# Patient Record
Sex: MALE | Race: WHITE | NOT HISPANIC OR LATINO | Employment: UNEMPLOYED | URBAN - METROPOLITAN AREA
[De-identification: names, ages, dates, MRNs, and addresses within clinical notes are randomized per-mention and may not be internally consistent; named-entity substitution may affect disease eponyms.]

---

## 2017-03-15 ENCOUNTER — AMBULATORY - HEALTHEAST (OUTPATIENT)
Dept: CARDIOLOGY | Facility: CLINIC | Age: 47
End: 2017-03-15

## 2017-03-15 ENCOUNTER — OFFICE VISIT - HEALTHEAST (OUTPATIENT)
Dept: CARDIOLOGY | Facility: CLINIC | Age: 47
End: 2017-03-15

## 2017-03-15 DIAGNOSIS — I48.0 PAROXYSMAL ATRIAL FIBRILLATION (H): ICD-10-CM

## 2017-03-15 ASSESSMENT — MIFFLIN-ST. JEOR: SCORE: 1903.96

## 2017-03-16 LAB
ATRIAL RATE - MUSE: 72 BPM
DIASTOLIC BLOOD PRESSURE - MUSE: NORMAL MMHG
INTERPRETATION ECG - MUSE: NORMAL
P AXIS - MUSE: 61 DEGREES
PR INTERVAL - MUSE: 168 MS
QRS DURATION - MUSE: 92 MS
QT - MUSE: 376 MS
QTC - MUSE: 411 MS
R AXIS - MUSE: 69 DEGREES
SYSTOLIC BLOOD PRESSURE - MUSE: NORMAL MMHG
T AXIS - MUSE: 47 DEGREES
VENTRICULAR RATE- MUSE: 72 BPM

## 2018-02-20 ENCOUNTER — AMBULATORY - HEALTHEAST (OUTPATIENT)
Dept: CARDIOLOGY | Facility: CLINIC | Age: 48
End: 2018-02-20

## 2018-02-20 ENCOUNTER — RECORDS - HEALTHEAST (OUTPATIENT)
Dept: ADMINISTRATIVE | Facility: OTHER | Age: 48
End: 2018-02-20

## 2018-02-21 ENCOUNTER — OFFICE VISIT - HEALTHEAST (OUTPATIENT)
Dept: CARDIOLOGY | Facility: CLINIC | Age: 48
End: 2018-02-21

## 2018-02-21 DIAGNOSIS — I48.0 AF (PAROXYSMAL ATRIAL FIBRILLATION) (H): ICD-10-CM

## 2018-02-21 RX ORDER — CARBAMAZEPINE 100 MG/1
100 TABLET, EXTENDED RELEASE ORAL DAILY
Status: SHIPPED | COMMUNITY
Start: 2018-02-21 | End: 2022-08-29

## 2018-02-21 ASSESSMENT — MIFFLIN-ST. JEOR: SCORE: 1909.75

## 2018-02-22 LAB
ATRIAL RATE - MUSE: 66 BPM
DIASTOLIC BLOOD PRESSURE - MUSE: NORMAL MMHG
INTERPRETATION ECG - MUSE: NORMAL
P AXIS - MUSE: 52 DEGREES
PR INTERVAL - MUSE: 162 MS
QRS DURATION - MUSE: 84 MS
QT - MUSE: 370 MS
QTC - MUSE: 387 MS
R AXIS - MUSE: 62 DEGREES
SYSTOLIC BLOOD PRESSURE - MUSE: NORMAL MMHG
T AXIS - MUSE: 45 DEGREES
VENTRICULAR RATE- MUSE: 66 BPM

## 2018-02-27 ENCOUNTER — COMMUNICATION - HEALTHEAST (OUTPATIENT)
Dept: CARDIOLOGY | Facility: CLINIC | Age: 48
End: 2018-02-27

## 2018-03-06 ENCOUNTER — RECORDS - HEALTHEAST (OUTPATIENT)
Dept: ADMINISTRATIVE | Facility: OTHER | Age: 48
End: 2018-03-06

## 2018-03-06 ENCOUNTER — AMBULATORY - HEALTHEAST (OUTPATIENT)
Dept: CARDIOLOGY | Facility: CLINIC | Age: 48
End: 2018-03-06

## 2018-03-07 ENCOUNTER — COMMUNICATION - HEALTHEAST (OUTPATIENT)
Dept: CARDIOLOGY | Facility: CLINIC | Age: 48
End: 2018-03-07

## 2018-04-03 ENCOUNTER — RECORDS - HEALTHEAST (OUTPATIENT)
Dept: LAB | Facility: CLINIC | Age: 48
End: 2018-04-03

## 2018-04-05 LAB — BACTERIA SPEC CULT: NORMAL

## 2018-05-23 ENCOUNTER — RECORDS - HEALTHEAST (OUTPATIENT)
Dept: LAB | Facility: CLINIC | Age: 48
End: 2018-05-23

## 2018-05-24 LAB
ANION GAP SERPL CALCULATED.3IONS-SCNC: 11 MMOL/L (ref 5–18)
BASOPHILS # BLD AUTO: 0 THOU/UL (ref 0–0.2)
BASOPHILS NFR BLD AUTO: 0 % (ref 0–2)
BUN SERPL-MCNC: 17 MG/DL (ref 8–22)
CALCIUM SERPL-MCNC: 9.2 MG/DL (ref 8.5–10.5)
CHLORIDE BLD-SCNC: 107 MMOL/L (ref 98–107)
CO2 SERPL-SCNC: 23 MMOL/L (ref 22–31)
CREAT SERPL-MCNC: 1.12 MG/DL (ref 0.7–1.3)
EOSINOPHIL # BLD AUTO: 0.1 THOU/UL (ref 0–0.4)
EOSINOPHIL NFR BLD AUTO: 1 % (ref 0–6)
ERYTHROCYTE [DISTWIDTH] IN BLOOD BY AUTOMATED COUNT: 13.4 % (ref 11–14.5)
GFR SERPL CREATININE-BSD FRML MDRD: >60 ML/MIN/1.73M2
GLUCOSE BLD-MCNC: 118 MG/DL (ref 70–125)
HCT VFR BLD AUTO: 42.1 % (ref 40–54)
HGB BLD-MCNC: 13.4 G/DL (ref 14–18)
LYMPHOCYTES # BLD AUTO: 2.2 THOU/UL (ref 0.8–4.4)
LYMPHOCYTES NFR BLD AUTO: 33 % (ref 20–40)
MCH RBC QN AUTO: 32.3 PG (ref 27–34)
MCHC RBC AUTO-ENTMCNC: 31.8 G/DL (ref 32–36)
MCV RBC AUTO: 101 FL (ref 80–100)
MONOCYTES # BLD AUTO: 0.4 THOU/UL (ref 0–0.9)
MONOCYTES NFR BLD AUTO: 6 % (ref 2–10)
NEUTROPHILS # BLD AUTO: 3.9 THOU/UL (ref 2–7.7)
NEUTROPHILS NFR BLD AUTO: 60 % (ref 50–70)
PLATELET # BLD AUTO: 144 THOU/UL (ref 140–440)
PMV BLD AUTO: 10.2 FL (ref 8.5–12.5)
POTASSIUM BLD-SCNC: 4.3 MMOL/L (ref 3.5–5)
RBC # BLD AUTO: 4.15 MILL/UL (ref 4.4–6.2)
SODIUM SERPL-SCNC: 141 MMOL/L (ref 136–145)
WBC: 6.6 THOU/UL (ref 4–11)

## 2018-05-26 LAB — LAMOTRIGINE SERPL-MCNC: 5 UG/ML (ref 2.5–15)

## 2018-05-27 LAB
CARBAMAZEPINE EP SERPL-MCNC: <0.2 UG/ML (ref 0.4–4)
SPECIMEN STATUS: ABNORMAL

## 2018-07-13 ENCOUNTER — AMBULATORY - HEALTHEAST (OUTPATIENT)
Dept: SLEEP MEDICINE | Facility: CLINIC | Age: 48
End: 2018-07-13

## 2018-07-13 ENCOUNTER — OFFICE VISIT - HEALTHEAST (OUTPATIENT)
Dept: SLEEP MEDICINE | Facility: CLINIC | Age: 48
End: 2018-07-13

## 2018-07-13 DIAGNOSIS — G47.33 OSA ON CPAP: ICD-10-CM

## 2018-07-13 ASSESSMENT — MIFFLIN-ST. JEOR: SCORE: 1906.12

## 2018-07-16 ENCOUNTER — AMBULATORY - HEALTHEAST (OUTPATIENT)
Dept: SLEEP MEDICINE | Facility: CLINIC | Age: 48
End: 2018-07-16

## 2018-07-19 ENCOUNTER — AMBULATORY - HEALTHEAST (OUTPATIENT)
Dept: SLEEP MEDICINE | Facility: CLINIC | Age: 48
End: 2018-07-19

## 2018-07-30 ENCOUNTER — COMMUNICATION - HEALTHEAST (OUTPATIENT)
Dept: SLEEP MEDICINE | Facility: CLINIC | Age: 48
End: 2018-07-30

## 2018-07-30 DIAGNOSIS — G47.33 OSA (OBSTRUCTIVE SLEEP APNEA): ICD-10-CM

## 2018-08-26 ENCOUNTER — RECORDS - HEALTHEAST (OUTPATIENT)
Dept: SLEEP MEDICINE | Facility: CLINIC | Age: 48
End: 2018-08-26

## 2018-08-26 DIAGNOSIS — G47.33 OBSTRUCTIVE SLEEP APNEA (ADULT) (PEDIATRIC): ICD-10-CM

## 2018-09-11 ENCOUNTER — COMMUNICATION - HEALTHEAST (OUTPATIENT)
Dept: SLEEP MEDICINE | Facility: CLINIC | Age: 48
End: 2018-09-11

## 2018-09-12 ENCOUNTER — COMMUNICATION - HEALTHEAST (OUTPATIENT)
Dept: SLEEP MEDICINE | Facility: CLINIC | Age: 48
End: 2018-09-12

## 2018-09-13 ENCOUNTER — AMBULATORY - HEALTHEAST (OUTPATIENT)
Dept: SLEEP MEDICINE | Facility: CLINIC | Age: 48
End: 2018-09-13

## 2018-10-15 ENCOUNTER — OFFICE VISIT - HEALTHEAST (OUTPATIENT)
Dept: SLEEP MEDICINE | Facility: CLINIC | Age: 48
End: 2018-10-15

## 2018-10-15 ENCOUNTER — AMBULATORY - HEALTHEAST (OUTPATIENT)
Dept: SLEEP MEDICINE | Facility: CLINIC | Age: 48
End: 2018-10-15

## 2018-10-15 DIAGNOSIS — G47.33 OSA ON CPAP: ICD-10-CM

## 2018-10-15 ASSESSMENT — MIFFLIN-ST. JEOR: SCORE: 1946.94

## 2018-10-16 ENCOUNTER — RECORDS - HEALTHEAST (OUTPATIENT)
Dept: LAB | Facility: CLINIC | Age: 48
End: 2018-10-16

## 2018-10-16 ENCOUNTER — COMMUNICATION - HEALTHEAST (OUTPATIENT)
Dept: SLEEP MEDICINE | Facility: CLINIC | Age: 48
End: 2018-10-16

## 2018-10-16 DIAGNOSIS — G47.33 OSA (OBSTRUCTIVE SLEEP APNEA): ICD-10-CM

## 2018-10-16 LAB
ANION GAP SERPL CALCULATED.3IONS-SCNC: 11 MMOL/L (ref 5–18)
BUN SERPL-MCNC: 16 MG/DL (ref 8–22)
CALCIUM SERPL-MCNC: 9 MG/DL (ref 8.5–10.5)
CHLORIDE BLD-SCNC: 106 MMOL/L (ref 98–107)
CHOLEST SERPL-MCNC: 223 MG/DL
CO2 SERPL-SCNC: 26 MMOL/L (ref 22–31)
CREAT SERPL-MCNC: 1.26 MG/DL (ref 0.7–1.3)
FASTING STATUS PATIENT QL REPORTED: NO
GFR SERPL CREATININE-BSD FRML MDRD: >60 ML/MIN/1.73M2
GLUCOSE BLD-MCNC: 87 MG/DL (ref 70–125)
HDLC SERPL-MCNC: 31 MG/DL
LDLC SERPL CALC-MCNC: 120 MG/DL
LDLC SERPL CALC-MCNC: ABNORMAL MG/DL
POTASSIUM BLD-SCNC: 4.3 MMOL/L (ref 3.5–5)
SODIUM SERPL-SCNC: 143 MMOL/L (ref 136–145)
TRIGL SERPL-MCNC: 588 MG/DL

## 2018-10-22 ENCOUNTER — AMBULATORY - HEALTHEAST (OUTPATIENT)
Dept: SLEEP MEDICINE | Facility: CLINIC | Age: 48
End: 2018-10-22

## 2018-10-25 ENCOUNTER — COMMUNICATION - HEALTHEAST (OUTPATIENT)
Dept: ADMINISTRATIVE | Facility: CLINIC | Age: 48
End: 2018-10-25

## 2018-11-16 ENCOUNTER — OFFICE VISIT - HEALTHEAST (OUTPATIENT)
Dept: AUDIOLOGY | Facility: CLINIC | Age: 48
End: 2018-11-16

## 2018-11-16 ENCOUNTER — OFFICE VISIT - HEALTHEAST (OUTPATIENT)
Dept: OTOLARYNGOLOGY | Facility: CLINIC | Age: 48
End: 2018-11-16

## 2018-11-16 DIAGNOSIS — H60.393 INFECTIVE OTITIS EXTERNA, BILATERAL: ICD-10-CM

## 2018-11-16 DIAGNOSIS — H66.006 RECURRENT ACUTE SUPPURATIVE OTITIS MEDIA WITHOUT SPONTANEOUS RUPTURE OF TYMPANIC MEMBRANE OF BOTH SIDES: ICD-10-CM

## 2018-11-16 DIAGNOSIS — Z01.10 EXAMINATION OF EARS AND HEARING: ICD-10-CM

## 2018-11-16 RX ORDER — FLUOCINOLONE ACETONIDE 0.25 MG/G
OINTMENT TOPICAL
Qty: 30 G | Refills: 0 | Status: SHIPPED | OUTPATIENT
Start: 2018-11-16 | End: 2022-08-29

## 2018-12-17 ENCOUNTER — RECORDS - HEALTHEAST (OUTPATIENT)
Dept: LAB | Facility: CLINIC | Age: 48
End: 2018-12-17

## 2018-12-18 LAB
CARBAMAZEPINE SERPL-MCNC: 2.3 UG/ML (ref 4–12)
VIT B12 SERPL-MCNC: 448 PG/ML (ref 213–816)

## 2018-12-19 LAB
25(OH)D3 SERPL-MCNC: 43.2 NG/ML (ref 30–80)
LAMOTRIGINE SERPL-MCNC: 5.3 UG/ML (ref 2.5–15)

## 2018-12-20 LAB — BACTERIA SPEC CULT: NORMAL

## 2019-01-03 ENCOUNTER — RECORDS - HEALTHEAST (OUTPATIENT)
Dept: ADMINISTRATIVE | Facility: OTHER | Age: 49
End: 2019-01-03

## 2019-01-03 ENCOUNTER — AMBULATORY - HEALTHEAST (OUTPATIENT)
Dept: CARDIOLOGY | Facility: CLINIC | Age: 49
End: 2019-01-03

## 2019-01-09 ENCOUNTER — OFFICE VISIT - HEALTHEAST (OUTPATIENT)
Dept: CARDIOLOGY | Facility: CLINIC | Age: 49
End: 2019-01-09

## 2019-01-09 DIAGNOSIS — I48.3 TYPICAL ATRIAL FLUTTER (H): ICD-10-CM

## 2019-01-09 LAB — POC INR - HE - HISTORICAL: 2.5 (ref 0.9–1.1)

## 2019-01-09 ASSESSMENT — MIFFLIN-ST. JEOR: SCORE: 1946.94

## 2019-01-14 ENCOUNTER — COMMUNICATION - HEALTHEAST (OUTPATIENT)
Dept: CARDIOLOGY | Facility: CLINIC | Age: 49
End: 2019-01-14

## 2019-04-23 ENCOUNTER — COMMUNICATION - HEALTHEAST (OUTPATIENT)
Dept: CARDIOLOGY | Facility: CLINIC | Age: 49
End: 2019-04-23

## 2019-06-04 ENCOUNTER — COMMUNICATION - HEALTHEAST (OUTPATIENT)
Dept: CARDIOLOGY | Facility: CLINIC | Age: 49
End: 2019-06-04

## 2019-10-10 ENCOUNTER — RECORDS - HEALTHEAST (OUTPATIENT)
Dept: LAB | Facility: CLINIC | Age: 49
End: 2019-10-10

## 2019-10-12 LAB — BACTERIA SPEC CULT: NORMAL

## 2019-11-12 ENCOUNTER — RECORDS - HEALTHEAST (OUTPATIENT)
Dept: LAB | Facility: CLINIC | Age: 49
End: 2019-11-12

## 2019-11-12 LAB
CHOLEST SERPL-MCNC: 183 MG/DL
FASTING STATUS PATIENT QL REPORTED: ABNORMAL
FASTING STATUS PATIENT QL REPORTED: NORMAL
GLUCOSE BLD-MCNC: 86 MG/DL (ref 70–125)
HDLC SERPL-MCNC: 33 MG/DL
LDLC SERPL CALC-MCNC: 118 MG/DL
TRIGL SERPL-MCNC: 159 MG/DL

## 2019-12-13 ENCOUNTER — AMBULATORY - HEALTHEAST (OUTPATIENT)
Dept: CARDIOLOGY | Facility: CLINIC | Age: 49
End: 2019-12-13

## 2019-12-17 ENCOUNTER — OFFICE VISIT - HEALTHEAST (OUTPATIENT)
Dept: CARDIOLOGY | Facility: CLINIC | Age: 49
End: 2019-12-17

## 2019-12-17 DIAGNOSIS — I48.0 PAROXYSMAL ATRIAL FIBRILLATION (H): ICD-10-CM

## 2019-12-17 LAB
ATRIAL RATE - MUSE: NORMAL
DIASTOLIC BLOOD PRESSURE - MUSE: NORMAL
INTERPRETATION ECG - MUSE: NORMAL
P AXIS - MUSE: NORMAL
PR INTERVAL - MUSE: NORMAL
QRS DURATION - MUSE: 94 MS
QT - MUSE: 362 MS
QTC - MUSE: 401 MS
R AXIS - MUSE: 54 DEGREES
SYSTOLIC BLOOD PRESSURE - MUSE: NORMAL
T AXIS - MUSE: 32 DEGREES
VENTRICULAR RATE- MUSE: 74 BPM

## 2019-12-17 RX ORDER — TRIAMCINOLONE ACETONIDE 1 MG/G
OINTMENT TOPICAL
Status: SHIPPED | COMMUNITY
Start: 2019-12-03 | End: 2022-08-29

## 2020-02-14 ENCOUNTER — COMMUNICATION - HEALTHEAST (OUTPATIENT)
Dept: CARDIOLOGY | Facility: CLINIC | Age: 50
End: 2020-02-14

## 2020-02-14 DIAGNOSIS — I48.3 TYPICAL ATRIAL FLUTTER (H): ICD-10-CM

## 2020-05-18 ENCOUNTER — RECORDS - HEALTHEAST (OUTPATIENT)
Dept: LAB | Facility: CLINIC | Age: 50
End: 2020-05-18

## 2020-05-18 LAB
ANION GAP SERPL CALCULATED.3IONS-SCNC: 12 MMOL/L (ref 5–18)
BASOPHILS # BLD AUTO: 0 THOU/UL (ref 0–0.2)
BASOPHILS NFR BLD AUTO: 0 % (ref 0–2)
BUN SERPL-MCNC: 19 MG/DL (ref 8–22)
CALCIUM SERPL-MCNC: 9 MG/DL (ref 8.5–10.5)
CHLORIDE BLD-SCNC: 108 MMOL/L (ref 98–107)
CO2 SERPL-SCNC: 23 MMOL/L (ref 22–31)
CREAT SERPL-MCNC: 1.16 MG/DL (ref 0.7–1.3)
EOSINOPHIL # BLD AUTO: 0.1 THOU/UL (ref 0–0.4)
EOSINOPHIL NFR BLD AUTO: 1 % (ref 0–6)
ERYTHROCYTE [DISTWIDTH] IN BLOOD BY AUTOMATED COUNT: 13.3 % (ref 11–14.5)
GFR SERPL CREATININE-BSD FRML MDRD: >60 ML/MIN/1.73M2
GLUCOSE BLD-MCNC: 89 MG/DL (ref 70–125)
HCT VFR BLD AUTO: 42.3 % (ref 40–54)
HGB BLD-MCNC: 13.9 G/DL (ref 14–18)
LYMPHOCYTES # BLD AUTO: 2.3 THOU/UL (ref 0.8–4.4)
LYMPHOCYTES NFR BLD AUTO: 32 % (ref 20–40)
MCH RBC QN AUTO: 32.6 PG (ref 27–34)
MCHC RBC AUTO-ENTMCNC: 32.9 G/DL (ref 32–36)
MCV RBC AUTO: 99 FL (ref 80–100)
MONOCYTES # BLD AUTO: 0.6 THOU/UL (ref 0–0.9)
MONOCYTES NFR BLD AUTO: 8 % (ref 2–10)
NEUTROPHILS # BLD AUTO: 4.2 THOU/UL (ref 2–7.7)
NEUTROPHILS NFR BLD AUTO: 57 % (ref 50–70)
PLATELET # BLD AUTO: 127 THOU/UL (ref 140–440)
PMV BLD AUTO: 9.6 FL (ref 8.5–12.5)
POTASSIUM BLD-SCNC: 4 MMOL/L (ref 3.5–5)
RBC # BLD AUTO: 4.26 MILL/UL (ref 4.4–6.2)
SODIUM SERPL-SCNC: 143 MMOL/L (ref 136–145)
WBC: 7.3 THOU/UL (ref 4–11)

## 2020-05-19 LAB — 25(OH)D3 SERPL-MCNC: 40.7 NG/ML (ref 30–80)

## 2020-05-20 LAB — LAMOTRIGINE SERPL-MCNC: 6.9 UG/ML (ref 2.5–15)

## 2020-05-23 LAB
CARBAMAZEPINE EP SERPL-MCNC: <0.2 UG/ML (ref 0.4–4)
SPECIMEN STATUS: ABNORMAL

## 2020-08-05 ENCOUNTER — COMMUNICATION - HEALTHEAST (OUTPATIENT)
Dept: CARDIOLOGY | Facility: CLINIC | Age: 50
End: 2020-08-05

## 2020-08-05 DIAGNOSIS — I48.3 TYPICAL ATRIAL FLUTTER (H): ICD-10-CM

## 2020-08-05 RX ORDER — APIXABAN 5 MG/1
TABLET, FILM COATED ORAL
Qty: 180 TABLET | Refills: 1 | Status: SHIPPED | OUTPATIENT
Start: 2020-08-05 | End: 2022-02-01

## 2020-09-25 ENCOUNTER — RECORDS - HEALTHEAST (OUTPATIENT)
Dept: LAB | Facility: CLINIC | Age: 50
End: 2020-09-25

## 2020-09-29 LAB — BACTERIA SPEC CULT: NORMAL

## 2020-10-19 DIAGNOSIS — G47.33 OSA (OBSTRUCTIVE SLEEP APNEA): Primary | ICD-10-CM

## 2020-12-03 ENCOUNTER — RECORDS - HEALTHEAST (OUTPATIENT)
Dept: ADMINISTRATIVE | Facility: OTHER | Age: 50
End: 2020-12-03

## 2020-12-03 ENCOUNTER — AMBULATORY - HEALTHEAST (OUTPATIENT)
Dept: CARDIOLOGY | Facility: CLINIC | Age: 50
End: 2020-12-03

## 2020-12-04 ENCOUNTER — COMMUNICATION - HEALTHEAST (OUTPATIENT)
Dept: CARDIOLOGY | Facility: CLINIC | Age: 50
End: 2020-12-04

## 2020-12-07 ENCOUNTER — OFFICE VISIT - HEALTHEAST (OUTPATIENT)
Dept: CARDIOLOGY | Facility: CLINIC | Age: 50
End: 2020-12-07

## 2020-12-07 DIAGNOSIS — Z79.01 ANTICOAGULATION ADEQUATE: ICD-10-CM

## 2020-12-07 DIAGNOSIS — I48.0 PAROXYSMAL ATRIAL FIBRILLATION (H): ICD-10-CM

## 2020-12-07 RX ORDER — CLOTRIMAZOLE 1 %
1 CREAM (GRAM) TOPICAL DAILY
Status: SHIPPED | COMMUNITY
Start: 2020-11-26 | End: 2022-08-29

## 2020-12-07 ASSESSMENT — MIFFLIN-ST. JEOR: SCORE: 1951.48

## 2021-02-10 ENCOUNTER — RECORDS - HEALTHEAST (OUTPATIENT)
Dept: LAB | Facility: CLINIC | Age: 51
End: 2021-02-10

## 2021-02-10 LAB
SARS-COV-2 PCR COMMENT: NORMAL
SARS-COV-2 RNA SPEC QL NAA+PROBE: NEGATIVE
SARS-COV-2 VIRUS SPECIMEN SOURCE: NORMAL

## 2021-03-25 ENCOUNTER — COMMUNICATION - HEALTHEAST (OUTPATIENT)
Dept: CARDIOLOGY | Facility: CLINIC | Age: 51
End: 2021-03-25

## 2021-05-25 ENCOUNTER — RECORDS - HEALTHEAST (OUTPATIENT)
Dept: ADMINISTRATIVE | Facility: CLINIC | Age: 51
End: 2021-05-25

## 2021-05-26 ENCOUNTER — RECORDS - HEALTHEAST (OUTPATIENT)
Dept: ADMINISTRATIVE | Facility: CLINIC | Age: 51
End: 2021-05-26

## 2021-05-27 ENCOUNTER — RECORDS - HEALTHEAST (OUTPATIENT)
Dept: ADMINISTRATIVE | Facility: CLINIC | Age: 51
End: 2021-05-27

## 2021-05-28 ENCOUNTER — RECORDS - HEALTHEAST (OUTPATIENT)
Dept: ADMINISTRATIVE | Facility: CLINIC | Age: 51
End: 2021-05-28

## 2021-05-28 NOTE — TELEPHONE ENCOUNTER
"----- Message from Diandra Silverman sent at 4/23/2019  3:38 PM CDT -----  Contact: Becca Bowman (mother)  Caller: Aidee     Primary cardiologist: Dr. Adamson    Detailed reason for call: Aidee states Lavon is ready to make the switch from coumadin to Eloquis and she is double checking the dose and directions and/or bridging if needed. She also asks if Lavon would need an INR, and at what point? Before, during or after the transition? Also does he need additional \"coverage\" such as taking a low dose aspirin? Aidee said she is also making an anticoagulation transition herself, and it seems his instructions are very different than hers. Please call Aidee.     New or active symptoms? No    Best phone number: [phone]    Best time to contact: Any    Ok to leave a detailed message? No  "

## 2021-05-28 NOTE — TELEPHONE ENCOUNTER
Reviewed instructions of switching to eliquis per [facility] last note.  Patient's mom verbalizes understanding and agrees to plan.  Medication list previously updated.  LAKIA

## 2021-05-29 ENCOUNTER — RECORDS - HEALTHEAST (OUTPATIENT)
Dept: ADMINISTRATIVE | Facility: CLINIC | Age: 51
End: 2021-05-29

## 2021-05-29 NOTE — TELEPHONE ENCOUNTER
----- Message from Yajaira Whiteside sent at 6/4/2019  2:01 PM CDT -----  Contact: nurse Lilibeth  General phone call:    Caller: Nurse Mcelroy  Primary cardiologist: Juan Diego  Detailed reason for call: Nurse found pt's meds not taken- asked if she should give pt his Eloquis now-  New or active symptoms?   Best phone number: Lilibeth @ [phone]- pls call asap, thank you!  Best time to contact:   Ok to leave a detailed message?   Device? No    Additional Info:

## 2021-05-29 NOTE — TELEPHONE ENCOUNTER
Patient not being set up for any procedure.  Patient missed morning dose, advised to take evening dose as scheduled.  Nurse verbalizes understanding and agrees to plan.  LAKIA

## 2021-05-30 ENCOUNTER — RECORDS - HEALTHEAST (OUTPATIENT)
Dept: ADMINISTRATIVE | Facility: CLINIC | Age: 51
End: 2021-05-30

## 2021-05-30 VITALS — HEIGHT: 72 IN | BODY MASS INDEX: 30.2 KG/M2 | WEIGHT: 223 LBS

## 2021-05-31 ENCOUNTER — RECORDS - HEALTHEAST (OUTPATIENT)
Dept: ADMINISTRATIVE | Facility: CLINIC | Age: 51
End: 2021-05-31

## 2021-06-01 VITALS — WEIGHT: 224.8 LBS | HEIGHT: 72 IN | BODY MASS INDEX: 30.45 KG/M2

## 2021-06-01 VITALS — WEIGHT: 224 LBS | BODY MASS INDEX: 30.34 KG/M2 | HEIGHT: 72 IN

## 2021-06-02 ENCOUNTER — RECORDS - HEALTHEAST (OUTPATIENT)
Dept: ADMINISTRATIVE | Facility: CLINIC | Age: 51
End: 2021-06-02

## 2021-06-02 VITALS — BODY MASS INDEX: 31.56 KG/M2 | HEIGHT: 72 IN | WEIGHT: 233 LBS

## 2021-06-02 VITALS — WEIGHT: 233 LBS | HEIGHT: 72 IN | BODY MASS INDEX: 31.56 KG/M2

## 2021-06-04 VITALS
OXYGEN SATURATION: 97 % | RESPIRATION RATE: 16 BRPM | DIASTOLIC BLOOD PRESSURE: 72 MMHG | HEART RATE: 85 BPM | BODY MASS INDEX: 31.22 KG/M2 | SYSTOLIC BLOOD PRESSURE: 104 MMHG | WEIGHT: 227 LBS

## 2021-06-04 NOTE — PATIENT INSTRUCTIONS - HE
Lavon Iraheta    Thank you for coming to the [facility] today for a cardiac evaluation  It was my pleasure to see you today  A good contact for any questions would be Angelica Jim  RN @ [phone]    ECG shows atrial fibrillation controlled ventricular response; heart rate is 74 bpm  Exam is otherwise normal  Continue current medications  Continue anticoagulation with Eliquis 5 mg twice daily  In 1 year obtain echocardiogram  Return in 1 year for comprehensive cardiac evaluation-sooner if problems arise  Continue to all activities without limitations       Patient Requesting a refill.    Medication(s) for Pratima Leary submitted for a refill request and is pending approval from the Provider.    Caller has been advised that their call does not guarantee an immediate refill. This refill will be reviewed within 24-72 hours by a qualified provider who will determine whether he or she can refill the medication.    Patient has contacted the pharmacy?  Yes    Call Back Number: [phone]    Can a detailed message be left? Yes_No_---: Yes    Additional information: Pt is being told by [facility] on [facility] they are waiting on  to call in refill of pantoprazole (PROTONIX) 40 MG tablet. Pt is upset that she needs to call this in monthly is asking for more refills. Please call her once this is completed.    Patient is completely out of medications    Patient’s preferred pharmacy has been noted and populated.           [id number] [address] AT [facility] [address]  [address]  [facility] [address]  Phone: [phone] Fax: [phone]

## 2021-06-04 NOTE — PROGRESS NOTES
[facility] Note    Assessment:  Atrial dysrhythmia.    atypical typical flutter as shown by   Holter monitor. ECG 12/22/ 2014;   Also atrial fibrillation pattern at 90 beats per minute   Atrial  flutter ablation 02/25/2010. At that time the procedure seemed successful in that there was   termination of the flutter and bidirectional block was achieved.   3. The frequency and duration of atrial flutter is uncertain and is asymptomatic-   4. No structural heart disease.        a. Normal exam.        b. Normal baseline electrocardiogram.        c. Normal echocardiogram.   4. Obstructive sleep apnea with CPAP   Factor V Leiden deficiency with history of deep vein thrombosis September 2012   Chronic anticoagulation with warfarin-satisfactory ; now Eliquis   Mental deficiency with satisfactory social situation, living in group home with 3 other adult males   Seizure disorder incompletely controlled with Tegretol by:lapse-like episodes       Plan:      ECG shows atrial fibrillation controlled ventricular response; heart rate is 74 bpm  Exam is otherwise normal  Continue current medications  Continue anticoagulation with Eliquis 5 mg twice daily  In 1 year obtain echocardiogram  Return in 1 year for comprehensive cardiac evaluation-sooner if problems arise  Continue to all activities without limitations    His exam shows a fairly regular slightly irregular pulse I could not tell if it was atrial fibrillation until EKG showed that he was in atrial fibrillation and with a ventricular rate of 74 bpm      Subjective:    I had the opportunity to see.Lavon Iraheta , who is a 49 y.o. male with a known history of atrial arrhythmias  Junior continues live in a group home with 3 other adults.  He is doing pretty well, has been active not sick use it does his day-to-day activities  Offers no complaints of chest pain breathlessness no fainting or signout episodes  Now on  Eliquis 5 mg twice daily.  Is been much easier for  management  Did have a lipid panel on November 12, 2019 at that time cholesterol 183 with         Problem List:  Patient Active Problem List   Diagnosis     Paroxysmal atrial fibrillation (H)     Medical History:  Past Medical History:   Diagnosis Date     GERD (gastroesophageal reflux disease)      Mental retardation      Seizure disorder (H)      Surgical History:  No past surgical history on file.  Social History:  Social History     Socioeconomic History     Marital status: Single     Spouse name: Not on file     Number of children: Not on file     Years of education: Not on file     Highest education level: Not on file   Occupational History     Not on file   Social Needs     Financial resource strain: Not on file     Food insecurity:     Worry: Not on file     Inability: Not on file     Transportation needs:     Medical: Not on file     Non-medical: Not on file   Tobacco Use     Smoking status: Never Smoker     Smokeless tobacco: Never Used   Substance and Sexual Activity     Alcohol use: Not on file     Drug use: Not on file     Sexual activity: Not on file   Lifestyle     Physical activity:     Days per week: Not on file     Minutes per session: Not on file     Stress: Not on file   Relationships     Social connections:     Talks on phone: Not on file     Gets together: Not on file     Attends Buddhism service: Not on file     Active member of club or organization: Not on file     Attends meetings of clubs or organizations: Not on file     Relationship status: Not on file     Intimate partner violence:     Fear of current or ex partner: Not on file     Emotionally abused: Not on file     Physically abused: Not on file     Forced sexual activity: Not on file   Other Topics Concern     Not on file   Social History Narrative    He is a resident of a group home (12/22/2014)       Review of Systems:      General: WNL  Eyes: WNL  Ears/Nose/Throat: WNL  Lungs: WNL  Heart: WNL  Stomach: Diarrhea  Bladder:  WNL  Muscle/Joints: WNL  Skin: Rash  Nervous System: Falls  Mental Health: WNL     Blood: WNL        Family History:  No family history on file.      Allergies:  Allergies   Allergen Reactions     Biaxin [Clarithromycin]      Codeine      Diphenhydramine Hcl      Levofloxacin      Morphine        Medications:  Current Outpatient Medications   Medication Sig Dispense Refill     ACETAMINOPHEN (TYLENOL ORAL) Take 1,000 mg by mouth every 6 (six) hours as needed.        apixaban (ELIQUIS) 5 mg Tab tablet Take 1 tablet (5 mg total) by mouth 2 (two) times a day. 180 tablet 5     CHOLECALCIFEROL, VITAMIN D3, (VITAMIN D3 ORAL) Take 5,000 Units by mouth daily.        lamoTRIgine (LAMICTAL) 100 MG tablet Take 100 mg by mouth daily.        lamoTRIgine (LAMICTAL) 25 MG tablet Take 25 mg by mouth daily.        METHYLCELLULOSE (CITRUCEL ORAL) Take 2 g by mouth daily.        METOPROLOL TARTRATE ORAL Take 25 mg by mouth 2 times a day at 6:00 am and 4:00 pm.        triamcinolone (KENALOG) 0.1 % ointment        calcium carbonate (TUMS E-X) 300 mg (750 mg) Chew Take as directed       carBAMazepine (TEGRETOL  XR) 100 MG 12 hr tablet Take 100 mg by mouth daily.       carBAMazepine (TEGRETOL XR) 200 MG 12 hr tablet Take 100 mg by mouth 2 (two) times a day.        fluocinolone (SYNALAR) 0.025 % ointment Apply via cotton swab twice daily for 7 days and then PRN for itching.. 30 g 0     MELATONIN/PYRIDOXINE (MELATONIN, WITH B6, ORAL) Take by mouth.       No current facility-administered medications for this visit.        Objective:   Vital signs:  /72 (Patient Site: Left Arm, Patient Position: Sitting, Cuff Size: Adult Regular)   Pulse 85   Resp 16   Wt (!) 227 lb (103 kg)   SpO2 97%   BMI 31.22 kg/m    Heart rate 80 mostly regular but some irregularity as well  Abnormal mental process   Seems coordinated; moves well; up to exam table with help  Physical Exam:  Overweight  GENERAL APPEARANCE: Alert, cooperative and in no acute  distress.  HEENT: No scleral icterus. No Xanthelasma. Oral mucous membranes pink and moist.  NECK: JVP normal cm. No Hepatojugular reflux. Thyroid not  Palpable  CHEST: clear to auscultation and percussion  CARDIOVASCULAR: S1, S2 without murmur    Brachial, radial  pulses are intact and symmetric.   No carotid bruits noted.  ABDOMEN: Non tender. BS+. No bruits.  EXTREMITIES: No cyanosis, clubbing or edema.    Lab Results:  LIPIDS:  Lab Results   Component Value Date    CHOL 183 11/12/2019    CHOL 223 (H) 10/16/2018    CHOL 220 (H) 01/29/2016     Lab Results   Component Value Date    HDL 33 (L) 11/12/2019    HDL 31 (L) 10/16/2018    HDL 31 (L) 01/29/2016     Lab Results   Component Value Date    LDLCALC 118 11/12/2019    LDLCALC  10/16/2018      Comment:      Invalid, Triglycerides >400    LDLCALC 113 01/29/2016     Lab Results   Component Value Date    TRIG 159 (H) 11/12/2019    TRIG 588 (H) 10/16/2018    TRIG 382 (H) 01/29/2016     No components found for: CHOLHDL    BMP:  Lab Results   Component Value Date    CREATININE 1.26 10/16/2018    BUN 16 10/16/2018     10/16/2018    K 4.3 10/16/2018     10/16/2018    CO2 26 10/16/2018         This note has been dictated using voice recognition software. Any grammatical or context distortions are unintentional and inherent to the software.  Jeevan Adamson MD  [facility]  [phone]

## 2021-06-05 VITALS
SYSTOLIC BLOOD PRESSURE: 114 MMHG | HEIGHT: 72 IN | WEIGHT: 234 LBS | HEART RATE: 80 BPM | RESPIRATION RATE: 16 BRPM | BODY MASS INDEX: 31.69 KG/M2 | DIASTOLIC BLOOD PRESSURE: 74 MMHG

## 2021-06-09 NOTE — PROGRESS NOTES
[facility] Heart Care Note    Assessment:  . Atrial dysrhythmia. This appears to be a atypical typical flutter as shown by   Holter monitor. ECG 12/22/ 2014; Also atrial fibrillation pattern at 90 beats per minute   2. History of documented typical flutter:   flutter ablation 02/25/2010. At that time the procedure seemed successful in that there was   termination of the flutter and bidirectional block was achieved.   3. The frequency and duration of atrial flutter is uncertain and is asymptomatic-   4. No structural heart disease.        a. Normal exam.        b. Normal baseline electrocardiogram.        c. Normal echocardiogram.   4. Obstructive sleep apnea with CPAP   Factor V Leiden deficiency with history of deep vein thrombosis September 2012   Chronic anticoagulation with warfarin-satisfactory   Mental deficiency with satisfactory social situation, living in group home with 3 other adult males   Seizure disorder incompletely controlled with Tegretol by:lapse-like episodes     ECG today shows sinus rhythm at 72 beats per minute with a few single PACs, incomplete right bundle branch block    Plan:  Continue  current medications  Should remain on warfarin for deep vein thrombosis prophylaxis  Continue metoprolol 25 milligrams twice daily  No further cardiac evaluation or medical adjustments needed at this time  May pursue activities without any cardiac sourced  limitations  Follow up in 1 year or sooner if needed        Subjective:    I had the opportunity to see.Lavon Iraheta , who is a 46 y.o. male with a known history of atrial arrhythmias and atrial flutter  Junior continues to live in a group home with 3 other men  He is not very active, spends quite a bit of time watching movies and TV  I do not believe he is on a regular exercise program  He does not complain of palpitations arrhythmias fast heart rate and or chest pain  They do not notice any fluid retention swelling or edema  Apparently when he sees   Zack Cho, there is concern that he has episodes of atrial fibrillation  When I reviewed the records I cannot find an electrocardiogram documented the atrial dysrhythmia  Because of deep vein thrombosis, factor Leiden deficiency, he remains on warfarin and his INRs have been well-adjusted  The only bleeding has been some bleeding from his gums with dental  Care/hygeine  No major seizure problems have been identified  Apparently Junior has been well behaved and is quite pleasant and is quite an enjoyable person; seems happy          Problem List:  Patient Active Problem List   Diagnosis     AF (atrial fibrillation)     Medical History:  Past Medical History:   Diagnosis Date     GERD (gastroesophageal reflux disease)      Mental retardation      Seizure disorder      Surgical History:  No past surgical history on file.  Social History:  Social History     Social History     Marital status: Single     Spouse name: N/A     Number of children: N/A     Years of education: N/A     Occupational History     Not on file.     Social History Main Topics     Smoking status: Never Smoker     Smokeless tobacco: Not on file     Alcohol use Not on file     Drug use: Not on file     Sexual activity: Not on file     Other Topics Concern     Not on file     Social History Narrative    He is a resident of a group home (12/22/2014)       Review of Systems:      General: WNL  Eyes: WNL  Ears/Nose/Throat: WNL  Lungs: Cough  Heart: Irregular Heartbeat  Stomach: Constipation  Bladder: WNL  Muscle/Joints: Muscle Weakness  Skin: WNL  Nervous System: WNL  Mental Health: WNL     Blood: WNL        Family History:  No family history on file.      Allergies:  Allergies   Allergen Reactions     Biaxin [Clarithromycin]      Codeine      Diphenhydramine Hcl      Levofloxacin        Medications:  Current Outpatient Prescriptions   Medication Sig Dispense Refill     ACETAMINOPHEN (TYLENOL ORAL) Take 500 mg by mouth every 6 (six) hours as needed.  "       carBAMazepine (TEGRETOL XR) 200 MG 12 hr tablet Take 100 mg by mouth 2 (two) times a day.        CHOLECALCIFEROL, VITAMIN D3, (VITAMIN D3 ORAL) Take 5,000 Units by mouth daily.        lamoTRIgine (LAMICTAL) 100 MG tablet Take 100 mg by mouth 2 (two) times a day.       lamoTRIgine (LAMICTAL) 25 MG tablet 2 tabs in the AM and 3 tabs in the PM       METOPROLOL TARTRATE ORAL Take 25 mg by mouth 2 times a day at 6:00 am and 4:00 pm.        WARFARIN SODIUM (COUMADIN ORAL) Take 5 mg by mouth. Take as directed       calcium carbonate (TUMS E-X) 300 mg (750 mg) Chew Take as directed       MELATONIN/PYRIDOXINE (MELATONIN, WITH B6, ORAL) Take by mouth.       METHYLCELLULOSE (CITRUCEL ORAL) daily.        No current facility-administered medications for this visit.          Objective:   Vital signs:  Visit Vitals     /70 (Patient Site: Right Arm, Patient Position: Sitting, Cuff Size: Adult Large)     Pulse 70     Resp 16     Ht 5' 11.65\" (1.82 m)     Wt (!) 223 lb (101.2 kg)     BMI 30.54 kg/m2         Physical Exam:  Pulse is 70 with an occasional ectopic beat    GENERAL APPEARANCE: Alert, cooperative and in no acute distress.  HEENT: No scleral icterus. No Xanthelasma. Oral mucous membranes pink and moist.  NECK: JVP cm. No Hepatojugular reflux. Thyroid not  Palpable  CHEST: clear to auscultation and percussion  CARDIOVASCULAR: S1, S2 without murmur    Brachial, radial  pulses are intact and symmetric.   No carotid bruits noted.  ABDOMEN: Non tender. BS+. No bruits.  EXTREMITIES: No cyanosis, clubbing or edema.    Lab Results:  LIPIDS:  Lab Results   Component Value Date    CHOL 220 (H) 01/29/2016    CHOL 233 (H) 02/23/2015     Lab Results   Component Value Date    HDL 31 (L) 01/29/2016    HDL 33 (L) 02/23/2015     Lab Results   Component Value Date    LDLCALC 113 01/29/2016    LDLCALC  02/23/2015      Comment:      Invalid, Triglycerides >300     Lab Results   Component Value Date    TRIG 382 (H) 01/29/2016    " TRIG 304 (H) 02/23/2015     No components found for: CHOLHDL    BMP:  Lab Results   Component Value Date    CREATININE 1.09 01/29/2016    BUN 15 01/29/2016     01/29/2016    K 4.6 01/29/2016     01/29/2016    CO2 27 01/29/2016         This note has been dictated using voice recognition software. Any grammatical or context distortions are unintentional and inherent to the software.  Jeevan Adamson MD  [facility]  [phone]

## 2021-06-13 NOTE — PROGRESS NOTES
[facility] Heart Care Note    Assessment:  Atrial dysrhythmia.    atypical typical flutter as shown by   Holter monitor. ECG 12/22/ 2014;   Also atrial fibrillation pattern at 90 beats per minute   Atrial  flutter ablation 02/25/2010. At that time the procedure seemed successful in that there was   termination of the flutter and bidirectional block was achieved.    Chronic atrial fibrillaiton  4. No structural heart disease.        a. Normal exam.        b. Normal baseline electrocardiogram.        c. Normal echocardiogram.   4. Obstructive sleep apnea with CPAP   Factor V Leiden deficiency with history of deep vein thrombosis September 2012   Chronic anticoagulation with warfarin-satisfactory ; now Eliquis   Mental deficiency with satisfactory social situation, living in group home with 3 other adult males   Seizure disorder incompletely controlled with Tegretol by:lapse-like episodes       Plan:    Pulse is slightly irregular with heart rate of 80 consistent with atrial fibrillation with controlled ventricular response  Doing well on Eliquis 5 mg twice daily should continue this medication he had comprehensive blood work in May that was satisfactory-I did not repeat any blood testing today  Continue current medications no adjustments  Follow-up as needed        Subjective:    I had the opportunity to see.Lavon Iraheta , who is a 50 y.o. male with a known history of atrial arrhythmias and now chronic atrial fibrillation with a controlled ventricular response  Junior has been doing well in his group home living with 3 other adults.    He does some work on a daily basis  Pain complaint is his ear hurts from wearing the mask  They include Tegretol,  Eliquis 5 mg twice daily  Metoprolol 25 mg twice daily  18 2020  Creatinine 1.16  Electrolytes normal  CBC normal, hemoglobin 13.9        Problem List:  Patient Active Problem List   Diagnosis     Paroxysmal atrial fibrillation (H)     Anticoagulation adequate     Medical  History:  Past Medical History:   Diagnosis Date     GERD (gastroesophageal reflux disease)      Mental retardation      Seizure disorder (H)      Surgical History:  No past surgical history on file.  Social History:  Social History     Socioeconomic History     Marital status: Single     Spouse name: Not on file     Number of children: Not on file     Years of education: Not on file     Highest education level: Not on file   Occupational History     Not on file   Social Needs     Financial resource strain: Not on file     Food insecurity     Worry: Not on file     Inability: Not on file     Transportation needs     Medical: Not on file     Non-medical: Not on file   Tobacco Use     Smoking status: Never Smoker     Smokeless tobacco: Never Used   Substance and Sexual Activity     Alcohol use: Not on file     Drug use: Not on file     Sexual activity: Not on file   Lifestyle     Physical activity     Days per week: Not on file     Minutes per session: Not on file     Stress: Not on file   Relationships     Social connections     Talks on phone: Not on file     Gets together: Not on file     Attends [employer] service: Not on file     Active member of club or organization: Not on file     Attends meetings of clubs or organizations: Not on file     Relationship status: Not on file     Intimate partner violence     Fear of current or ex partner: Not on file     Emotionally abused: Not on file     Physically abused: Not on file     Forced sexual activity: Not on file   Other Topics Concern     Not on file   Social History Narrative    He is a resident of a group home (12/22/2014)       Review of Systems:      General: WNL  Eyes: WNL  Ears/Nose/Throat: WNL  Lungs: WNL  Heart: WNL  Stomach: WNL  Bladder: WNL  Muscle/Joints: WNL  Skin: WNL  Nervous System: WNL  Mental Health: WNL     Blood: WNL        Family History:  No family history on file.      Allergies:  Allergies   Allergen Reactions     Biaxin [Clarithromycin]       "Codeine      Diphenhydramine Hcl      Levofloxacin      Morphine        Medications:  Current Outpatient Medications   Medication Sig Dispense Refill     ACETAMINOPHEN (TYLENOL ORAL) Take 500 mg by mouth every 6 (six) hours as needed.        calcium carbonate (TUMS E-X) 300 mg (750 mg) Chew Take as directed       carBAMazepine (TEGRETOL  XR) 100 MG 12 hr tablet Take 100 mg by mouth daily.       CHOLECALCIFEROL, VITAMIN D3, (VITAMIN D3 ORAL) Take 2,000 Units by mouth daily.        clotrimazole (LOTRIMIN) 1 % cream Apply 1 application topically daily.       ELIQUIS 5 mg Tab tablet TAKE 1 TABLET BY MOUTH TWICE DAILY. 180 tablet 1     lamoTRIgine (LAMICTAL) 100 MG tablet Take 100 mg by mouth daily.        lamoTRIgine (LAMICTAL) 25 MG tablet Take 25 mg by mouth daily.        MELATONIN/PYRIDOXINE (MELATONIN, WITH B6, ORAL) Take by mouth.       METHYLCELLULOSE (CITRUCEL ORAL) Take 2 g by mouth daily.        METOPROLOL TARTRATE ORAL Take 25 mg by mouth 2 times a day at 6:00 am and 4:00 pm.        triamcinolone (KENALOG) 0.1 % ointment        carBAMazepine (TEGRETOL XR) 200 MG 12 hr tablet Take 100 mg by mouth 2 (two) times a day.        fluocinolone (SYNALAR) 0.025 % ointment Apply via cotton swab twice daily for 7 days and then PRN for itching.. 30 g 0     No current facility-administered medications for this visit.        Objective:   Vital signs:  /74 (Patient Site: Left Arm, Patient Position: Sitting, Cuff Size: Adult Large)   Pulse 80   Resp 16   Ht 5' 11.5\" (1.816 m)   Wt (!) 234 lb (106.1 kg)   BMI 32.18 kg/m      Pulse 80 and irregular consistent with atrial fibrillation  Physical Exam:      GENERAL APPEARANCE: Alert, cooperative and in no acute distress.  HEENT: No scleral icterus. No Xanthelasma. Oral mucous membranes pink and moist.  NECK: JVP  Unable to assess cm. No Hepatojugular reflux. Thyroid not  Palpable  CHEST: clear to auscultation and percussion  CARDIOVASCULAR: S1, S2 without murmur    " Brachial, radial  pulses are intact and symmetric.   No carotid bruits noted.  ABDOMEN: Non tender. BS+. No bruits.  EXTREMITIES: No cyanosis, clubbing or edema. Pul;ses are difficult to feel   Lab Results:  LIPIDS:  Lab Results   Component Value Date    CHOL 183 11/12/2019    CHOL 223 (H) 10/16/2018    CHOL 220 (H) 01/29/2016     Lab Results   Component Value Date    HDL 33 (L) 11/12/2019    HDL 31 (L) 10/16/2018    HDL 31 (L) 01/29/2016     Lab Results   Component Value Date    LDLCALC 118 11/12/2019    LDLCALC  10/16/2018      Comment:      Invalid, Triglycerides >400    LDLCALC 113 01/29/2016     Lab Results   Component Value Date    TRIG 159 (H) 11/12/2019    TRIG 588 (H) 10/16/2018    TRIG 382 (H) 01/29/2016     No components found for: CHOLHDL    BMP:  Lab Results   Component Value Date    CREATININE 1.16 05/18/2020    BUN 19 05/18/2020     05/18/2020    K 4.0 05/18/2020     (H) 05/18/2020    CO2 23 05/18/2020         This note has been dictated using voice recognition software. Any grammatical or context distortions are unintentional and inherent to the software.  Jeevan Adamson MD  [facility] HEART [facility]  [phone]

## 2021-06-13 NOTE — PATIENT INSTRUCTIONS - HE
Lavon Iraheta    Thank you for coming to the [facility] today for a cardiac evaluation  It was my pleasure to see you today  A good contact for any questions would be Angelica Jim  RN @ [phone]    Pulse is slightly irregular with heart rate of 80 consistent with atrial fibrillation with controlled ventricular response  Doing well on Eliquis 5 mg twice daily should continue this medication he had comprehensive blood work in May that was satisfactory-I did not repeat any blood testing today  Continue current medications no adjustments  Follow-up as needed

## 2021-06-13 NOTE — TELEPHONE ENCOUNTER
Wellness Screening Tool  Symptom Screening:  Do you have one of the following NEW symptoms:    Fever (subjective or >100.0)?  No    A new cough?  No    Shortness of breath?  No     Chills? No     New loss of taste or smell? No     Generalized body aches? No     New persistent headache? No     New sore throat? No     Nausea, vomiting, or diarrhea?  No    Within the past 2 weeks, have you been exposed to someone with a known positive illness below:    COVID-19 (known or suspected)?  No    Chicken pox?  No    Mealses?  No    Pertussis?  No    Patient notified of visitor policy- No visitors are allowed to accompany the patient at this time. 1 staff will attend  Pt informed to wear a mask: Yes  Pt notified if they develop any symptoms listed above, prior to their appointment, they are to call the clinic directly at [phone] for further instructions.  Yes  Patient's appointment status: Patient will be seen in clinic as scheduled on 12/7/20

## 2021-06-16 PROBLEM — Z79.01 ANTICOAGULATION ADEQUATE: Status: ACTIVE | Noted: 2020-12-07

## 2021-06-16 NOTE — PROGRESS NOTES
[facility] Heart Care Note  Assessment:  . Atrial dysrhythmia. This appears to be a atypical typical flutter as shown by   Holter monitor. ECG 12/22/ 2014; Also atrial fibrillation pattern at 90 beats per minute   2. History of documented typical flutter:   flutter ablation 02/25/2010. At that time the procedure seemed successful in that there was   termination of the flutter and bidirectional block was achieved.   3. The frequency and duration of atrial flutter is uncertain and is asymptomatic-   4. No structural heart disease.        a. Normal exam.        b. Normal baseline electrocardiogram.        c. Normal echocardiogram.   4. Obstructive sleep apnea with CPAP   Factor V Leiden deficiency with history of deep vein thrombosis September 2012   Chronic anticoagulation with warfarin-satisfactory   Mental deficiency with satisfactory social situation, living in group home with 3 other adult males   Seizure disorder incompletely controlled with Tegretol by:lapse-like episodes     ECG today shows sinus rhythm with a few PACs no acute changes     Plan:    Junior has been doing well, is able to walk good distance without difficulty  Uncertain how much atrial arrhythmia he has or if he has recurrence of atrial fibrillation or atrial flutter-has no awareness or symptoms   Today the ECG  shows  Normal sinus rhythm with some PACs so the heartbeat is somewhat irregular but the heart rate is 66 bpm and not elevated  Alternative anticoagulation could be considered. A newer anticoagulant- such as Xarelto 20 mg could be given on daily basis.  This medicine does not need blood testing or dietary indiscretions may be a bit more expensive than warfarin would certainly be easier to manage  Continue other medications; no changes  Follow up in 6-12 months or as needed  If you would like to switch anticoagulants, call Angelica Jim RN and discuss this choice;  [phone]    Subjective:    I had the opportunity to see.Lavon SCHMIDT  Myrtle , who is a 47 y.o. male with a known history of  atrial arrhythmias/flutter/fibrillation  It is uncertain if Junior is having arrhythmias.  He has no symptoms of palpitations or fast heart rates and reviewing his primary physician notes, no mention is made of apparent atrial fibrillation  His exercise capacity is good.  When he walks in the mall or across parking lots he does quite well having no excessive fatigue or breathlessness  There is no signs of pedal edema  No fainting or near fainting episodes.  He has not had a lapse like spells or partial seizure in some time  He continues on warfarin and his INR is suggested.  He often runs an elevated INR  No problems with anticoagulation no bleeding problems      Problem List:  Patient Active Problem List   Diagnosis     AF (atrial fibrillation)     Medical History:  Past Medical History:   Diagnosis Date     GERD (gastroesophageal reflux disease)      Mental retardation      Seizure disorder      Surgical History:  No past surgical history on file.  Social History:  Social History     Social History     Marital status: Single     Spouse name: N/A     Number of children: N/A     Years of education: N/A     Occupational History     Not on file.     Social History Main Topics     Smoking status: Never Smoker     Smokeless tobacco: Never Used     Alcohol use Not on file     Drug use: Not on file     Sexual activity: Not on file     Other Topics Concern     Not on file     Social History Narrative    He is a resident of a group home (12/22/2014)       Review of Systems:      General: WNL  Eyes: WNL  Ears/Nose/Throat: WNL  Lungs: Cough  Heart: Irregular Heartbeat  Stomach: Diarrhea  Bladder: Frequent Urination at Night  Muscle/Joints: WNL  Skin: WNL  Nervous System: Daytime Sleepiness  Mental Health: WNL     Blood: WNL        Family History:  No family history on file.      Allergies:  Allergies   Allergen Reactions     Biaxin [Clarithromycin]      Codeine       "Diphenhydramine Hcl      Levofloxacin      Morphine        Medications:  Current Outpatient Prescriptions   Medication Sig Dispense Refill     carBAMazepine (TEGRETOL  XR) 100 MG 12 hr tablet Take 100 mg by mouth daily.       CHOLECALCIFEROL, VITAMIN D3, (VITAMIN D3 ORAL) Take 5,000 Units by mouth daily.        lamoTRIgine (LAMICTAL) 100 MG tablet Take 100 mg by mouth daily.        lamoTRIgine (LAMICTAL) 25 MG tablet Take 25 mg by mouth daily.        METHYLCELLULOSE (CITRUCEL ORAL) Take 2 g by mouth daily.        metoprolol tartrate (LOPRESSOR) 25 MG tablet Take 25 mg by mouth 2 (two) times a day.       WARFARIN SODIUM (COUMADIN ORAL) Take 5 mg by mouth. Take as directed       ACETAMINOPHEN (TYLENOL ORAL) Take 500 mg by mouth every 6 (six) hours as needed.        calcium carbonate (TUMS E-X) 300 mg (750 mg) Chew Take as directed       carBAMazepine (TEGRETOL XR) 200 MG 12 hr tablet Take 100 mg by mouth 2 (two) times a day.        MELATONIN/PYRIDOXINE (MELATONIN, WITH B6, ORAL) Take by mouth.       METOPROLOL TARTRATE ORAL Take 25 mg by mouth 2 times a day at 6:00 am and 4:00 pm.        No current facility-administered medications for this visit.          Objective:   Vital signs:  BP 92/60 (Patient Site: Left Arm, Patient Position: Sitting, Cuff Size: Adult Large)  Pulse 72  Resp 16  Ht 5' 11.5\" (1.816 m) Comment: shoes on  Wt (!) 224 lb 12.8 oz (102 kg) Comment: shoes on  BMI 30.92 kg/m2    Is difficult to feel his radial pulse either right or left side even brachial pulses difficult to feel.  Pulses about 64 with occasional singular ectopic beat-confirmed by EKG showing the ectopic beats or PACs  Physical Exam:  He appears comfortable  Breathing is good  Follow  Commands  Speaks a few words     GENERAL APPEARANCE: Alert, cooperative and in no acute distress.  HEENT: No scleral icterus. No Xanthelasma. Oral mucous membranes pink and moist.  NECK: JVP  Normal cm. No Hepatojugular reflux. Thyroid not  " Palpable  CHEST: clear to auscultation and percussion  CARDIOVASCULAR: S1, S2 without murmur    Brachial, radial  pulses are intact and symmetric.   No carotid bruits noted.  ABDOMEN: Non tender. BS+. No bruits.  EXTREMITIES: No cyanosis, clubbing or edema.    Lab Results:  LIPIDS:  Lab Results   Component Value Date    CHOL 220 (H) 01/29/2016    CHOL 233 (H) 02/23/2015     Lab Results   Component Value Date    HDL 31 (L) 01/29/2016    HDL 33 (L) 02/23/2015     Lab Results   Component Value Date    LDLCALC 113 01/29/2016    LDLCALC  02/23/2015      Comment:      Invalid, Triglycerides >300     Lab Results   Component Value Date    TRIG 382 (H) 01/29/2016    TRIG 304 (H) 02/23/2015     No components found for: CHOLHDL    BMP:  Lab Results   Component Value Date    CREATININE 1.09 01/29/2016    BUN 15 01/29/2016     01/29/2016    K 4.6 01/29/2016     01/29/2016    CO2 27 01/29/2016         This note has been dictated using voice recognition software. Any grammatical or context distortions are unintentional and inherent to the software.  Jeevan Adamson MD  [facility]  [phone]

## 2021-06-16 NOTE — TELEPHONE ENCOUNTER
PC back to care center  They had question in regarding to timing of the medication  Discussed importance of taking Eliquis 12 hours about two times a day as prescribed, and dicussed reasoning for this  Geneva verbalized understanding  Nevaeh  ----- Message from Chrissie Escalante sent at 3/25/2021  3:19 PM CDT -----  Regarding: GAG PT / DISCUSS MEDICATION INSTRUCTIONS  General phone call:    Caller: Geneva from [facility]     Primary cardiologist: DORA     Detailed reason for call: Geneva is requesting for a call back to discuss pt's Eliquis instructions.     Best phone number: [phone]    Best time to contact: Anytime     Ok to leave a detailed message? Yes     Device? No     Additional Info:

## 2021-06-19 NOTE — PROGRESS NOTES
Order for Durable Medical Equipment was processed and equipment ordered.     DME provider: [facility]    Date Faxed: 7/16/18    Ordering Provider: Dr. Penny    Equipment ordered: CPAP Supplies

## 2021-06-19 NOTE — PROGRESS NOTES
JOLENE has been faxed to :     [facility]: F) [phone] and     [facility]: F) [phone]    Date: 7/13/18

## 2021-06-19 NOTE — PROGRESS NOTES
We received records from [facility].  There is an office visit from 4/17/18 that mentions a history of OSAH on CPAP therapy. No information about AHI or pressure settings is mentioned on the note.  The note mentions an average usage of 10 hours per night and a residual AHI of 7 events/hour.  We will scan this note to my previous visit.

## 2021-06-19 NOTE — PROGRESS NOTES
Dear  Zack Cho Md  [address] [place] [address] [address]  [place] [address]    Thank you for the opportunity to participate in the care of  Lavon Iraheta.    He is a 48 y.o. y/o who comes to the clinic for consultation.    Lavon comes to clinic with his parents. He lives at a [facility] ([place])    The patient was diagnosed with DRAKE approximately 8 years ago. A PSG [place] was [place] [facility] but his family does not recall the exact location of this test.   Prior to the diagnosis of DRAKE he was experiencing snoring. His family reports that he has been using his CPAP device every night without difficulties. CPAP therapy has been effective at eliminating his snoring.     Patient has been having difficulties because his humidifier is not working well.      Current DME provider: [facility]  Current device:Respironics System One  Current settings: P= 6 cwp    30-day usage:  Current AHI: 7  Current compliance: 14/30 days of usage because his humidifier is not working well. 100% before the humidifier broke.   Family was under the impression that he was using his device every night.     During our conversation, his mother mentioned that the patient had difficulties to get supplies in the past because compliance was not met.     Past Medical History  Past Medical History:   Diagnosis Date     GERD (gastroesophageal reflux disease)      Mental retardation      Seizure disorder (H)         Past Surgical History  No past surgical history on file.     Meds  Current Outpatient Prescriptions   Medication Sig Dispense Refill     ACETAMINOPHEN (TYLENOL ORAL) Take 500 mg by mouth every 6 (six) hours as needed.        calcium carbonate (TUMS E-X) 300 mg (750 mg) Chew Take as directed       carBAMazepine (TEGRETOL  XR) 100 MG 12 hr tablet Take 100 mg by mouth daily.       carBAMazepine (TEGRETOL XR) 200 MG 12 hr tablet Take 100 mg by mouth 2 (two) times a day.        CHOLECALCIFEROL, VITAMIN D3,  (VITAMIN D3 ORAL) Take 5,000 Units by mouth daily.        COUMADIN 5 mg tablet   5     lamoTRIgine (LAMICTAL) 100 MG tablet Take 100 mg by mouth daily.        lamoTRIgine (LAMICTAL) 25 MG tablet Take 25 mg by mouth daily.        MELATONIN/PYRIDOXINE (MELATONIN, WITH B6, ORAL) Take by mouth.       METHYLCELLULOSE (CITRUCEL ORAL) Take 2 g by mouth daily.        metoprolol tartrate (LOPRESSOR) 25 MG tablet Take 25 mg by mouth 2 (two) times a day.       METOPROLOL TARTRATE ORAL Take 25 mg by mouth 2 times a day at 6:00 am and 4:00 pm.        WARFARIN SODIUM (COUMADIN ORAL) Take 5 mg by mouth. Take as directed       No current facility-administered medications for this visit.         Allergies  Biaxin [clarithromycin]; Codeine; Diphenhydramine hcl; Levofloxacin; and Morphine     Social History  Social History     Social History     Marital status: Single     Spouse name: N/A     Number of children: N/A     Years of education: N/A     Occupational History     Not on file.     Social History Main Topics     Smoking status: Never Smoker     Smokeless tobacco: Never Used     Alcohol use Not on file     Drug use: Not on file     Sexual activity: Not on file     Other Topics Concern     Not on file     Social History Narrative    He is a resident of a group home (12/22/2014)        Family History  No family history on file.        Review of Systems:  Constitutional: Negative except as noted in HPI.   Eyes: Negative except as noted in HPI.   ENT: Negative except as noted in HPI.   Cardiovascular: Negative except as noted in HPI.   Respiratory: Negative except as noted in HPI.   Gastrointestinal: Negative except as noted in HPI.   Genitourinary: Negative except as noted in HPI.   Musculoskeletal: Negative except as noted in HPI.   Integumentary: Negative except as noted in HPI.   Neurological: Negative except as noted in HPI.   Psychiatric: Negative except as noted in HPI.   Endocrine: Negative except as noted in HPI.  "  Hematologic/Lymphatic: Negative except as noted in HPI.      Physical Exam:  /62  Pulse 75  Ht 5' 11.5\" (1.816 m)  Wt (!) 224 lb (101.6 kg)  SpO2 95%  BMI 30.81 kg/m2  BMI:Body mass index is 30.81 kg/(m^2).   GEN: NAD, obese  Neurological: Alert, oriented to time, place, and person.  Psych:  normal mood, normal affect     Labs/Studies:     Lab Results   Component Value Date    WBC 6.6 05/23/2018    HGB 13.4 (L) 05/23/2018    HCT 42.1 05/23/2018     (H) 05/23/2018     05/23/2018         Chemistry        Component Value Date/Time     05/23/2018 1643    K 4.3 05/23/2018 1643     05/23/2018 1643    CO2 23 05/23/2018 1643    BUN 17 05/23/2018 1643    CREATININE 1.12 05/23/2018 1643     05/23/2018 1643        Component Value Date/Time    CALCIUM 9.2 05/23/2018 1643    ALKPHOS 76 05/13/2015 1711    AST 16 05/13/2015 1711    ALT 14 05/13/2015 1711    BILITOT 0.2 05/13/2015 1711              Assessment and Plan:  In summary Lavon Iraheta is a 48 y.o. year old male here for consultation.  .  1. Obstructive sleep apnea.  Patient has a cpap device but the humidifier chamber is not working well.  We will request a copy of his previous [facility]  We will write an order for supplies and to fix the humidifier.   It is unclear if parts will be covered if compliance was an issue in the past.     F/u to be determined after records are received.      Patient verbalized understanding of these issues, agrees with the plan and all questions were answered today. Patient was given an opportuntity to voice any other symptoms or concerns not listed above. Patient did not have any other symptoms or concerns.       Nick Penny MD  [employer] Board Certified in Internal Medicine and Sleep Medicine  [facility].    We spent a total of 30 minutes of face-to-face encounter and more than 50% of the encounter was used for counseling or coordination of care.    "

## 2021-06-20 NOTE — PROGRESS NOTES
Order for Durable Medical Equipment was processed and equipment ordered.     DME provider: [facility]    Date Faxed: 9/13/18    Ordering Provider: Dr. Penny    Equipment ordered: Supplies/Evaluate humidifier/repair or replace

## 2021-06-21 NOTE — PROGRESS NOTES
HPI:  Patient has had 2 infections to the ears in the last year.   Patient also notes itching to ears.     Past medical history, surgical history, social history, family history, medications, and allergies have been reviewed with the patient and are documented above.    Review of Systems: a 10-system review was performed. Pertinent positives are noted in the HPI and on a separate scanned document in the chart.    PHYSICAL EXAMINATION:  GEN: no acute distress, normocephalic  EYES: extraocular movements are intact, pupils are equal and round. Sclera clear.   EARS: auricles are normally formed. The external auditory canals are clear with minimal to no cerumen. Tympanic membranes are intact bilaterally with no signs of infection, effusion, retractions, or perforations.  NEURO: CN II-XII are intact bilaterally. alert and oriented. No nystagmus. Gait is normal.  PULM: breathing comfortably on room air, normal chest expansion with respiration  HEART: regular rate and rhythm, no peripheral edema    AUDIOGRAM: OAEs normal, normal tympanogram.  Normal SRT    MEDICAL DECISION-MAKING: Would recommend Synalar ointment for the ears and will send this in.  I would not recommend tubes at this point, but if we started getting more than 4 ear infections in a year, tympanostomy tubes could be considered.

## 2021-06-21 NOTE — PROGRESS NOTES
Order for Durable Medical Equipment was processed and equipment ordered.     DME provider: John    Date Faxed: 10/15/18    Ordering Provider: Dr. Penny    Equipment ordered: GINNY/CPAP Supplies

## 2021-06-21 NOTE — PROGRESS NOTES
Audiology Report    Referring Provider:   Service    History:  Lavon Iraheta is seen in conjunction with ENT appointment today. He is accompanied by a staff member from his group home. She reports concern with ear infections whenever Lavon is sick. She and Lavon both deny hearing concerns. Lavon does report that his ears are itchy.     Results:     Left Ear Right Ear   Otoscopy non-occluding cerumen non-occluding cerumen   Pure Tone Audiometry Patient unable to condition to task   Patient unable to condition to task   Word Recognition Could not test Could not test   Tympanometry shallow (Type As)  shallow (Type As)     Transducer: Circumaural headphones    Reliability was poor as patient could not condition to task. A normal SAT obtained bilaterally via point-to-picture.     Distortion product otoacoustic emissions present from 2000, 2387-1604 Hz in the right ear and present from 8122-3619 Hz in the left ear.     Plan:  The patient is returned to ENT for follow up.  He should return for retesting with ENT recommendation.  A 60 minute appointment is needed to obtain further behavioral information if needed.     Please see audiogram under  media  and  audiogram  in the patient s chart.     Shaniqua Hernández, CCC-A  [place] Licensed Audiologist #[id number]

## 2021-06-21 NOTE — PROGRESS NOTES
Dear Zack Cho MD  [address] [place] [address] [address]  [place] [address],    Thank you for the opportunity to participate in the care of Lavon Iraheta.     He is a 48 y.o. y/o male patient who comes to the sleep medicine clinic for a follow up visit.    He comes to clinic with his mother and father. We reviewed all the information with his parents since the patient himself was not aware of our conversation due to his cognitive disability.    We had an extensive conversation to review the results of his sleep study. He has a long history of OSAH but his compliance was low and he needed a new PSG to re-qualify for supplies.    The overnight polysomnography was completed with a digital sleep system using the international 10-20 electrode placement for recording EEG, EOG, EMG from chin, ECG, respiratory effort, oximetry, body position, airflow, nasal pressure, snoring sound, pulse rate and limb movement channels.    The study was completed as a split night study.    1. During the diagnostic portion of the study respiratory monitoring showed severe obstructive sleep apnea/hypopnea (AHI=31.6).  2. A trial of nasal CPAP was initiated given the severity of sleep-disordered breathing and CPAP of 8 cwp was effective at eliminating obstructive events.      We reviewed the oxygen saturation graph as well as the result tables from the report.    He has a Respironics System one device that was set at a pressure of 6 cwp. His humidifier is not working well and they have been unable to replace this device because he had low compliance in the past.     I ran a manual compliance report.    30-days with P= 6 cwp  Residual AHI: 6.7  Leak: 0%  Compliance: 19/30 usage > 30 days    Mask Tolerance: moderate difficulties  Skin irritation: no        Past Medical History:   Diagnosis Date     GERD (gastroesophageal reflux disease)      Mental retardation      Seizure disorder (H)      Patient Active Problem List   Diagnosis     AF (atrial  fibrillation) (H)       Social History     Social History     Marital status: Single     Spouse name: N/A     Number of children: N/A     Years of education: N/A     Occupational History     Not on file.     Social History Main Topics     Smoking status: Never Smoker     Smokeless tobacco: Never Used     Alcohol use Not on file     Drug use: Not on file     Sexual activity: Not on file     Other Topics Concern     Not on file     Social History Narrative    He is a resident of a group home (12/22/2014)       No family history on file.      Review of Systems:  General: No weight gain, no weight loss  Eyes: No vision changes  ENT: No hearing changes  Cardio: No chest pain, no nocturnal dyspnea  Respiratory: No shortness of breath, no cough  Gastrointestinal: No diarrhea, no constipation  Genitourinary: No excessive urination  Tegumentary: No rashes  Neurological: No seizures, no loss of consciousness  Endo: No heat or cold intolerance.    Current Outpatient Prescriptions   Medication Sig Dispense Refill     ACETAMINOPHEN (TYLENOL ORAL) Take 500 mg by mouth every 6 (six) hours as needed.        calcium carbonate (TUMS E-X) 300 mg (750 mg) Chew Take as directed       carBAMazepine (TEGRETOL  XR) 100 MG 12 hr tablet Take 100 mg by mouth daily.       carBAMazepine (TEGRETOL XR) 200 MG 12 hr tablet Take 100 mg by mouth 2 (two) times a day.        CHOLECALCIFEROL, VITAMIN D3, (VITAMIN D3 ORAL) Take 5,000 Units by mouth daily.        COUMADIN 5 mg tablet 7.5 mg.   5     lamoTRIgine (LAMICTAL) 100 MG tablet Take 100 mg by mouth daily.        lamoTRIgine (LAMICTAL) 25 MG tablet Take 25 mg by mouth daily.        MELATONIN/PYRIDOXINE (MELATONIN, WITH B6, ORAL) Take by mouth.       METHYLCELLULOSE (CITRUCEL ORAL) Take 2 g by mouth daily.        METOPROLOL TARTRATE ORAL Take 25 mg by mouth 2 times a day at 6:00 am and 4:00 pm.        No current facility-administered medications for this visit.        Allergies   Allergen Reactions  "    Biaxin [Clarithromycin]      Codeine      Diphenhydramine Hcl      Levofloxacin      Morphine        Physical Exam:  Pulse 75  Ht 5' 11.5\" (1.816 m)  Wt (!) 233 lb (105.7 kg)  SpO2 95%  BMI 32.04 kg/m2  BMI:Body mass index is 32.04 kg/(m^2).   GEN: NAD, obese  Neurological: He answers questions but he is not well oriented.  Psych: pleasant demeanor.      Labs/Studies:  - We reviewed the results of the overnight PSG as described on the HPI.     Lab Results   Component Value Date    WBC 6.6 05/23/2018    HGB 13.4 (L) 05/23/2018    HCT 42.1 05/23/2018     (H) 05/23/2018     05/23/2018         Chemistry        Component Value Date/Time     05/23/2018 1643    K 4.3 05/23/2018 1643     05/23/2018 1643    CO2 23 05/23/2018 1643    BUN 17 05/23/2018 1643    CREATININE 1.12 05/23/2018 1643     05/23/2018 1643        Component Value Date/Time    CALCIUM 9.2 05/23/2018 1643    ALKPHOS 76 05/13/2015 1711    AST 16 05/13/2015 1711    ALT 14 05/13/2015 1711    BILITOT 0.2 05/13/2015 1711             No results found for: FERRITIN    No results found for: HGBA1C    Assessment and Plan:  In summary Lavon Iraheta is a 48 y.o. year old male here for follow up.  1. Obstructive Sleep Apnea  We had an extensive conversation to review the results of his sleep study.  I changed his CPAP device to P= 8 cwp.  They would like to change DME companies and this will be possible now that he completed a new PSG.     Patient verbalized understanding of these issues, agrees with the plan and all questions were answered today. Patient was given an opportuntity to voice any other symptoms or concerns not listed above. Patient did not have any other symptoms or concerns.      Patient told to return in 6 months.     I explained to the patient that I will be transitioning to a different job soon. I reassured him that this transition should not cause any significant disruptions to his care. I provided some " information on the process and encouraged him to contact our main number if he has any questions or needs any help.    MD ALCIDES Aguilar Board Certified in Internal Medicine and Sleep Medicine  [facility]    We spent a total of 40 minutes of face-to-face encounter and more than 50% of the encounter was used for counseling or coordination of care.

## 2021-06-21 NOTE — PROGRESS NOTES
Order for Durable Medical Equipment was processed and equipment ordered.     DME provider: John    Date Faxed: 10/22/18    Ordering Provider: Dr. Penny    Equipment ordered: CPAP

## 2021-06-22 NOTE — PROGRESS NOTES
[facility] Heart Care Note   Atrial dysrhythmia.    atypical typical flutter as shown by   Holter monitor. ECG 12/22/ 2014;   Also atrial fibrillation pattern at 90 beats per minute   Atrial  flutter ablation 02/25/2010. At that time the procedure seemed successful in that there was   termination of the flutter and bidirectional block was achieved.   3. The frequency and duration of atrial flutter is uncertain and is asymptomatic-   4. No structural heart disease.        a. Normal exam.        b. Normal baseline electrocardiogram.        c. Normal echocardiogram.   4. Obstructive sleep apnea with CPAP   Factor V Leiden deficiency with history of deep vein thrombosis September 2012   Chronic anticoagulation with warfarin-satisfactory   Mental deficiency with satisfactory social situation, living in group home with 3 other adult males   Seizure disorder incompletely controlled with Tegretol by:lapse-like episodes        Plan:    Heart rhythm today seems to be regular with some extra beats that are likely premature atrial contractions; PACs  We talked about switching from warfarin to Eliquis  Stop warfarin for 3 days then start Eliquis 5 mg twice daily  No need for any further INR checks  Return in 6 months for follow-up or sooner as need arises          Subjective:    I had the opportunity to see.Lavon Iraheta , who is a 48 y.o. male with a known history of atrial arrhythmia  Junior has been doing well quite active quite involved in activities  Works on a daily basis  Eats well  Remains on warfarin no trouble with bleeding and INRs have been well regulated  Rarely when he has his INR checked the clinic tells him that he is often in atrial fibrillation but no EKGs have confirmed  No syncopal or near syncopal episodes  Postural panel showed total cholesterol 223, HDL 31   October 16, 2018 electrolyte panel was normal, creatinine 1.26  Had no complaints of chest pain  Seems to be well adjusted, living in the group  home    Discussed anticoagulation with his mother.  She would like to switch from warfarin to an alternative anticoagulant.    She would favor Eliquis 5 mg twice daily.  We discussed this medicine does not require blood testing, no dietary indiscretions, that there is no immediate reversal agent but the drug has been shown to cause less risk of serious bleeding and more effective than warfarin in preventing thromboembolic events    Radius pulses difficult to feel, but when I listen to his blood pressure it seems regular with an occasional singular ectopic this would be similar to his previous EKG that showed sinus rhythm with frequent PACs I do not believe the pattern was atrial fibrillation or flutter    Mother has requested INR today  INR=2.5    Problem List:  Patient Active Problem List   Diagnosis     AF (atrial fibrillation) (H)     Medical History:  Past Medical History:   Diagnosis Date     GERD (gastroesophageal reflux disease)      Mental retardation      Seizure disorder (H)      Surgical History:  No past surgical history on file.  Social History:  Social History     Socioeconomic History     Marital status: Single     Spouse name: Not on file     Number of children: Not on file     Years of education: Not on file     Highest education level: Not on file   Social Needs     Financial resource strain: Not on file     Food insecurity - worry: Not on file     Food insecurity - inability: Not on file     Transportation needs - medical: Not on file     Transportation needs - non-medical: Not on file   Occupational History     Not on file   Tobacco Use     Smoking status: Never Smoker     Smokeless tobacco: Never Used   Substance and Sexual Activity     Alcohol use: Not on file     Drug use: Not on file     Sexual activity: Not on file   Other Topics Concern     Not on file   Social History Narrative    He is a resident of a group home (12/22/2014)       Review of Systems:      General: WNL  Eyes:  "WNL  Ears/Nose/Throat: WNL  Lungs: WNL  Heart: WNL  Stomach: WNL  Bladder: WNL  Muscle/Joints: WNL  Skin: WNL  Nervous System: WNL  Mental Health: WNL     Blood: WNL        Family History:  No family history on file.      Allergies:  Allergies   Allergen Reactions     Biaxin [Clarithromycin]      Codeine      Diphenhydramine Hcl      Levofloxacin      Morphine        Medications:  Current Outpatient Medications   Medication Sig Dispense Refill     ACETAMINOPHEN (TYLENOL ORAL) Take 500 mg by mouth every 6 (six) hours as needed.        calcium carbonate (TUMS E-X) 300 mg (750 mg) Chew Take as directed       carBAMazepine (TEGRETOL  XR) 100 MG 12 hr tablet Take 100 mg by mouth daily.       CHOLECALCIFEROL, VITAMIN D3, (VITAMIN D3 ORAL) Take 5,000 Units by mouth daily.        COUMADIN 5 mg tablet 7.5 mg.   5     fluocinolone (SYNALAR) 0.025 % ointment Apply via cotton swab twice daily for 7 days and then PRN for itching.. 30 g 0     lamoTRIgine (LAMICTAL) 100 MG tablet Take 100 mg by mouth daily.        lamoTRIgine (LAMICTAL) 25 MG tablet Take 25 mg by mouth daily.        METHYLCELLULOSE (CITRUCEL ORAL) Take 2 g by mouth daily.        METOPROLOL TARTRATE ORAL Take 25 mg by mouth 2 times a day at 6:00 am and 4:00 pm.        carBAMazepine (TEGRETOL XR) 200 MG 12 hr tablet Take 100 mg by mouth 2 (two) times a day.        MELATONIN/PYRIDOXINE (MELATONIN, WITH B6, ORAL) Take by mouth.       No current facility-administered medications for this visit.        Objective:   Vital signs:  /70 (Patient Site: Left Arm, Patient Position: Sitting, Cuff Size: Adult Large)   Pulse 62   Resp 16   Ht 5' 11.5\" (1.816 m)   Wt (!) 233 lb (105.7 kg)   BMI 32.04 kg/m        Physical Exam:  Rate in the mid 60s that is regular with frequent singular ectopic beats    GENERAL APPEARANCE: Alert, cooperative and in no acute distress.  HEENT: No scleral icterus. No Xanthelasma. Oral mucous membranes pink and moist.  NECK: JVP  Normal cm. " No Hepatojugular reflux. Thyroid not  Palpable  CHEST: clear to auscultation and percussion  CARDIOVASCULAR: S1, S2 without murmur    Brachial, radial  pulses are intact and symmetric.   No carotid bruits noted.  ABDOMEN: Non tender. BS+. No bruits.  EXTREMITIES: No cyanosis, clubbing or edema.    Lab Results:  LIPIDS:  Lab Results   Component Value Date    CHOL 223 (H) 10/16/2018    CHOL 220 (H) 01/29/2016    CHOL 233 (H) 02/23/2015     Lab Results   Component Value Date    HDL 31 (L) 10/16/2018    HDL 31 (L) 01/29/2016    HDL 33 (L) 02/23/2015     Lab Results   Component Value Date    LDLCALC  10/16/2018      Comment:      Invalid, Triglycerides >400    LDLCALC 113 01/29/2016    LDLCALC  02/23/2015      Comment:      Invalid, Triglycerides >300     Lab Results   Component Value Date    TRIG 588 (H) 10/16/2018    TRIG 382 (H) 01/29/2016    TRIG 304 (H) 02/23/2015     No components found for: CHOLHDL    BMP:  Lab Results   Component Value Date    CREATININE 1.26 10/16/2018    BUN 16 10/16/2018     10/16/2018    K 4.3 10/16/2018     10/16/2018    CO2 26 10/16/2018         This note has been dictated using voice recognition software. Any grammatical or context distortions are unintentional and inherent to the software.  Jeevan Adamson MD  [facility]  [phone]

## 2021-06-22 NOTE — PATIENT INSTRUCTIONS - HE
Lavon Iraheta    Thank you for coming to the [facility] today for a cardiac evaluation  It was my pleasure to see you today  A good contact for any questions would be Angelica Jim  RN @ [phone]    Heart rhythm today seems to be regular with some extra beats that are likely premature atrial contractions; PACs  We talked about switching from warfarin to Eliquis  Stop warfarin for 3 days then start Eliquis 5 mg twice daily  No need for any further INR checks  Return in 6 months for follow-up or sooner as need arises

## 2021-06-23 NOTE — TELEPHONE ENCOUNTER
----- Message from Diandra Silverman sent at 1/11/2019  4:27 PM CST -----  Contact: Sarah (mother)   Caller: Sarah    Primary cardiologist: Dr. Adamson    Detailed reason for call: Sarah states there would be a problem with Lavon starting to take the Eloquis Rx as it would counteract with his Tegretol. Abby neurologist will want to titrate down his Rx and start an extended release type. Please call sarah to discuss.     Best phone number: [phone]  Best time to contact: Any  Ok to leave a detailed message? Yes    Device? No

## 2021-06-23 NOTE — TELEPHONE ENCOUNTER
Dr. Adamson,      You recently saw patient on 1/9 and recommended switching from warfarin to eliquis.     His mother is calling to let you know eliquis interacts with Tegretol and his neurologist is aware and weaning him off.     In the meantime, patient is going to stay on warfarin until he is no longer taking Tegretol.     His mother wanted you aware.     Changes/recommendations?     Thanks  Deyanira    Concern about Tegretol/Eliquis interraction; drugs should not be given together  Once he is off Tegretol then proceed to switch from Warfarin to Eliquis 5 mg BID

## 2021-06-23 NOTE — TELEPHONE ENCOUNTER
Reviewed Dr. Keating ok with Becca.  She is happy and has no further questions.  Reviewed switching from warfarin to eliquis, she verbalizes understanding and agrees to plan.  JW

## 2021-06-23 NOTE — TELEPHONE ENCOUNTER
Dr. Adamson,     You recently saw patient on 1/9 and recommended switching from warfarin to eliquis.    His mother is calling to let you know eliquis interacts with Tegretol and his neurologist is aware and weaning him off.    In the meantime, patient is going to stay on warfarin until he is no longer taking Tegretol.    His mother wanted you aware.    Changes/recommendations?    Thanks  Deyanira

## 2021-08-06 ENCOUNTER — TELEPHONE (OUTPATIENT)
Dept: CARDIOLOGY | Facility: CLINIC | Age: 51
End: 2021-08-06

## 2021-08-06 DIAGNOSIS — I48.0 PAROXYSMAL ATRIAL FIBRILLATION (H): Primary | ICD-10-CM

## 2021-08-06 NOTE — TELEPHONE ENCOUNTER
Spoke to pts mother Becca regarding concerns of pt just not feeling well and being tired.  Pt was last seen by Dr. Adamson on 12/2020.  Pt has Chronic AFIB and Hrs were well controlled with metoprolol 25mg BID.  He is also on Eliquis 5mg BID for anticoagulation.    Pts mother is calling because she is worried that the HR they are getting at the group home in the 50s is too low, and she would like to have pt be evaluated to check and make sure nothing has changed since his visit with Dr. Adamson.    Dr. Adamson- what are your recommendations?  Would you like to order a monitor to see what his Hrs are doing throughout the day?  Follow up with you to discuss concerns?    Thank you,    Demetria

## 2021-08-06 NOTE — TELEPHONE ENCOUNTER
Martha Chadwick [facility] Support [facility]  Caller: Unspecified (Today, 10:53 AM)  General phone call:     Caller: Mother - Becca   Primary cardiologist: [facility] pt   Detailed reason for call: Staff at the group home have noticed instances of him saying he doesn't feel well and he is tired.  One night his pulse was 90 and in the morning has been in the 50's.   She called a week ago and no one called her back   Best phone number: [phone]   Best time to contact: any   Ok to leave a detailed message? yes   Device? no     Additional Info:

## 2021-08-09 NOTE — TELEPHONE ENCOUNTER
PC back from pts mother, his caregiver  corresponding information/recommendations reviewed, verbalized understanding, has no further questions at this time, contact information was given for further concerns/questions and scheduling notified to contact pt   8/9/2021 1:37 PM  Angelica Jim RN

## 2021-08-09 NOTE — TELEPHONE ENCOUNTER
Jeevan Adamson MD Holen, Megan, JAQUAN 1 hour ago (8:14 AM)   GG  Do 24 hour holter and follow up with me in device clinic   Has a pacemaker so bradycardia may be related to device settings    Message text

## 2021-08-16 ENCOUNTER — HOSPITAL ENCOUNTER (OUTPATIENT)
Dept: CARDIOLOGY | Facility: CLINIC | Age: 51
Discharge: HOME | End: 2021-08-16
Attending: INTERNAL MEDICINE | Admitting: INTERNAL MEDICINE
Payer: MEDICARE

## 2021-08-16 DIAGNOSIS — I48.0 PAROXYSMAL ATRIAL FIBRILLATION (H): ICD-10-CM

## 2021-08-16 PROCEDURE — 93227 XTRNL ECG REC<48 HR R&I: CPT | Performed by: INTERNAL MEDICINE

## 2021-08-16 PROCEDURE — 93225 XTRNL ECG REC<48 HRS REC: CPT

## 2021-08-21 ENCOUNTER — DOCUMENTATION ONLY (OUTPATIENT)
Facility: CLINIC | Age: 51
End: 2021-08-21

## 2021-08-23 ENCOUNTER — TELEPHONE (OUTPATIENT)
Dept: CARDIOLOGY | Facility: CLINIC | Age: 51
End: 2021-08-23

## 2021-08-23 NOTE — CONFIDENTIAL NOTE
Jeevan Adamson MD  [facility] Support [facility]  Impression:     Mildly abnormal Holter monitor tracing by virtue of the possible mild chronotropic incompetence (potential medication side effect versus intrinsic sinus node dysfunction) and mildly increased atrial ectopy.     Indication for study: Paroxysmal atrial fibrillation.  The patient demonstrated no sustained atrial fibrillation or flutter.     No significant ventricular arrhythmia     No profound bradycardia or pauses.

## 2021-08-23 NOTE — CONFIDENTIAL NOTE
Per documentation note from [facility] 8/21, holter monitor satisfactory continue scheduled follow up.      Attempted to contact Becca- pts caregiver & Mother to discuss.   Best phone number: [phone]     LM for her to call back.    Demetria

## 2021-08-23 NOTE — TELEPHONE ENCOUNTER
Return call to Becca Bowman, reviewed resulsts and recommendations.  Pt has an apt with [facility] on 9-13, suggested patient keep this apt.  She pauly wondering if there is any appointments sooner, explained that there likely is not but will ask scheduling to contact him if there is.  She states understanding.

## 2021-08-30 ENCOUNTER — OFFICE VISIT (OUTPATIENT)
Dept: CARDIOLOGY | Facility: CLINIC | Age: 51
End: 2021-08-30
Payer: MEDICARE

## 2021-08-30 VITALS
SYSTOLIC BLOOD PRESSURE: 118 MMHG | WEIGHT: 225 LBS | RESPIRATION RATE: 16 BRPM | BODY MASS INDEX: 30.94 KG/M2 | OXYGEN SATURATION: 95 % | HEART RATE: 92 BPM | DIASTOLIC BLOOD PRESSURE: 68 MMHG

## 2021-08-30 DIAGNOSIS — Z79.01 ANTICOAGULATION ADEQUATE: ICD-10-CM

## 2021-08-30 DIAGNOSIS — I48.0 PAROXYSMAL ATRIAL FIBRILLATION (H): Primary | ICD-10-CM

## 2021-08-30 PROCEDURE — 99214 OFFICE O/P EST MOD 30 MIN: CPT | Performed by: INTERNAL MEDICINE

## 2021-08-30 NOTE — PATIENT INSTRUCTIONS
Lavon Iraheta    Thank you for coming to the [facility] today for a cardiac evaluation  It was my pleasure to see you today  A good contact for any questions would be Angelica Jim  RN @ [phone]    Some concern of slow heart rate noticed at the group home  Holter monitor about August 20, 2021 showed sinus rhythm, no atrial fibrillation or complex atrial ectopy.  The average heart rate was a bit slow, 57 bpm but no long pauses or excessive bradycardia identified  Exam today is good with normal heart rate and blood pressure  Continue current medications no adjustments were made  No restrictions on physical activity-actually he should be encouraged to be more active, does not seem very motivated for physical activity  Return in 1 year or sooner as need arises

## 2021-08-30 NOTE — LETTER
8/30/2021    Zack Cho MD  [facility], [place] [address]  [facility] [address]    RE: Lavon Iraheta       Dear Colleague,    I had the pleasure of seeing Lavon Iraheta in the [facility].    [facility] Heart Care Note  Assessment:  Atrial dysrhythmia.    atypical typical flutter as shown by   Holter monitor. ECG 12/22/ 2014;   Also atrial fibrillation pattern at 90 beats per minute   Atrial  flutter ablation 02/25/2010. At that time the procedure seemed successful in that there was   termination of the flutter and bidirectional block was achieved.    Episodes of persistent atrial fibrillaiton  Modest sinus bradycardia   No structural heart disease.        a. Normal exam.        b. Normal baseline electrocardiogram.        c. Normal echocardiogram.   Obstructive sleep apnea with CPAP   Factor V Leiden deficiency with history of deep vein thrombosis September 2012   Chronic anticoagulation with warfarin-satisfactory ; now Eliquis   Mental deficiency with satisfactory social situation, living in group home with 3 other adult males   Seizure disorder incompletely controlled with Tegretol by:lapse-like episodes        Plan:    Some concern of slow heart rate noticed at the group home  Holter monitor about August 20, 2021 showed sinus rhythm, no atrial fibrillation or complex atrial ectopy.  The average heart rate was a bit slow, 57 bpm but no long pauses or excessive bradycardia identified  Exam today is good with normal heart rate and blood pressure  Continue current medications no adjustments were made  No restrictions on physical activity-actually he should be encouraged to be more active, does not seem very motivated for physical activity  Return in 1 year or sooner as need arises        Subjective:    I had the opportunity to see.Lavon Iraheta , who is a 51 year old male with a known history of atrial arrhythmias  At the group home he  was noted to have some heart rates in the 50s and there is concerned that he excessive bradycardia  No apparent symptoms  To monitor August 19, 2021 showed sinus rhythm with an average heart rate of 57 bpm  Some atrial ectopy was seen but there was no complex atrial ectopy no atrial tachycardia or atrial fibrillation  No major bradycardia no pauses no AV block  Takes Eliquis 5 mg twice daily  Also metoprolol 25 mg twice daily  Does not do much activity, likes to watch TV and sit around.  Is encouraged to exercise  No complaints of chest pain breathlessness or fluid retention  No syncopal spells        Problem List:  Patient Active Problem List   Diagnosis     Paroxysmal atrial fibrillation (H)     Anticoagulation adequate     Medical History:  Past Medical History:   Diagnosis Date     GERD (gastroesophageal reflux disease)      Mental retardation      Seizure disorder (H)      Surgical History:  No past surgical history on file.  Social History:  Social History     Socioeconomic History     Marital status: Single     Spouse name: Not on file     Number of children: Not on file     Years of education: Not on file     Highest education level: Not on file   Occupational History     Not on file   Tobacco Use     Smoking status: Never Smoker     Smokeless tobacco: Never Used   Substance and Sexual Activity     Alcohol use: Not on file     Drug use: Not on file     Sexual activity: Not on file   Other Topics Concern     Not on file   Social History Narrative    He is a resident of a group home (12/22/2014)     Social Determinants of Health     Financial Resource Strain:      Difficulty of Paying Living Expenses:    Food Insecurity:      Worried About Running Out of Food in the Last Year:      Ran Out of Food in the Last Year:    Transportation Needs:      Lack of Transportation (Medical):      Lack of Transportation (Non-Medical):    Physical Activity:      Days of Exercise per Week:      Minutes of Exercise per Session:     Stress:      Feeling of Stress :    Social Connections:      Frequency of Communication with Friends and Family:      Frequency of Social Gatherings with Friends and Family:      Attends [employer] Services:      Active Member of Clubs or Organizations:      Attends Club or Organization Meetings:      Marital Status:    Intimate Partner Violence:      Fear of Current or Ex-Partner:      Emotionally Abused:      Physically Abused:      Sexually Abused:        Review of Systems   ,         Family History:  No family history on file.      Allergies:  Allergies   Allergen Reactions     Biaxin [Clarithromycin] Unknown     Codeine Unknown     Diphenhydramine Hcl [Diphenhydramine] Unknown     Levofloxacin Unknown     Morphine Unknown       Medications:  Current Outpatient Medications   Medication Sig Dispense Refill     ACETAMINOPHEN (TYLENOL ORAL) [ACETAMINOPHEN (TYLENOL ORAL)] Take 500 mg by mouth every 6 (six) hours as needed.        calcium carbonate (TUMS E-X) 300 mg (750 mg) Chew [CALCIUM CARBONATE (TUMS E-X) 300 MG (750 MG) CHEW] Take as directed       carBAMazepine (TEGRETOL  XR) 100 MG 12 hr tablet [CARBAMAZEPINE (TEGRETOL  XR) 100 MG 12 HR TABLET] Take 100 mg by mouth daily.       carBAMazepine (TEGRETOL XR) 200 MG 12 hr tablet [CARBAMAZEPINE (TEGRETOL XR) 200 MG 12 HR TABLET] Take 100 mg by mouth 2 (two) times a day.        CHOLECALCIFEROL, VITAMIN D3, (VITAMIN D3 ORAL) [CHOLECALCIFEROL, VITAMIN D3, (VITAMIN D3 ORAL)] Take 2,000 Units by mouth daily.        clotrimazole (LOTRIMIN) 1 % cream [CLOTRIMAZOLE (LOTRIMIN) 1 % CREAM] Apply 1 application topically daily.       ELIQUIS 5 mg Tab tablet [ELIQUIS 5 MG TAB TABLET] TAKE 1 TABLET BY MOUTH TWICE DAILY. 180 tablet 1     fluocinolone (SYNALAR) 0.025 % ointment [FLUOCINOLONE (SYNALAR) 0.025 % OINTMENT] Apply via cotton swab twice daily for 7 days and then PRN for itching.. 30 g 0     lamoTRIgine (LAMICTAL) 100 MG tablet [LAMOTRIGINE (LAMICTAL) 100 MG TABLET] Take  100 mg by mouth daily.        lamoTRIgine (LAMICTAL) 25 MG tablet [LAMOTRIGINE (LAMICTAL) 25 MG TABLET] Take 25 mg by mouth daily.        MEDICATION CANNOT BE REORDERED - PLEASE MANUALLY REORDER AND DISCONTINUE THE OLD ORDER [MELATONIN/PYRIDOXINE (MELATONIN, WITH B6, ORAL)] Take by mouth.       METHYLCELLULOSE (CITRUCEL ORAL) [METHYLCELLULOSE (CITRUCEL ORAL)] Take 2 g by mouth daily.        METOPROLOL TARTRATE ORAL [METOPROLOL TARTRATE ORAL] Take 25 mg by mouth 2 times a day at 6:00 am and 4:00 pm.        triamcinolone (KENALOG) 0.1 % ointment [TRIAMCINOLONE (KENALOG) 0.1 % OINTMENT]          Objective:   Vital signs:  /68 (BP Location: Left arm, Patient Position: Sitting, Cuff Size: Adult Large)   Pulse 92   Resp 16   Wt 102.1 kg (225 lb)   SpO2 95%   BMI 30.94 kg/m        Physical Exam:  Pulse is 80 and regular without ectopy      GENERAL APPEARANCE: Alert, cooperative and in no acute distress.  HEENT: No scleral icterus. No Xanthelasma. Oral mucous membranes pink and moist.  NECK: JVP  Difficult to determine cm. No Hepatojugular reflux. Thyroid not  Palpable  CHEST: clear to auscultation and percussion  CARDIOVASCULAR: S1, S2 without murmur    Brachial, radial  pulses are intact and symmetric.   No carotid bruits noted.  ABDOMEN: Non tender. BS+. No bruits.  EXTREMITIES: No cyanosis, clubbing or edema.    Lab Results:  LIPIDS:  Lab Results   Component Value Date    CHOL 183 11/12/2019    CHOL 223 (H) 10/16/2018    CHOL 220 (H) 01/29/2016     Lab Results   Component Value Date    HDL 33 (L) 11/12/2019    HDL 31 (L) 10/16/2018    HDL 31 (L) 01/29/2016     No components found for: LDLCALC  Lab Results   Component Value Date    TRIG 159 (H) 11/12/2019    TRIG 588 (H) 10/16/2018    TRIG 382 (H) 01/29/2016     No components found for: CHOLHDL    BMP:  Lab Results   Component Value Date    BUN 19 05/18/2020     05/18/2020    CO2 23 05/18/2020         This note has been dictated using voice recognition  software. Any grammatical or context distortions are unintentional and inherent to the software.  Jeevan Adamson MD  [facility]ARE  [phone]          Thank you for allowing me to participate in the care of your patient.      Sincerely,     Jeevan Adamson MD, MD     [facility]  cc:   No referring provider defined for this encounter.

## 2021-08-30 NOTE — PROGRESS NOTES
[facility] Heart Care Note  Assessment:  Atrial dysrhythmia.    atypical typical flutter as shown by   Holter monitor. ECG 12/22/ 2014;   Also atrial fibrillation pattern at 90 beats per minute   Atrial  flutter ablation 02/25/2010. At that time the procedure seemed successful in that there was   termination of the flutter and bidirectional block was achieved.    Episodes of persistent atrial fibrillaiton  Modest sinus bradycardia   No structural heart disease.        a. Normal exam.        b. Normal baseline electrocardiogram.        c. Normal echocardiogram.   Obstructive sleep apnea with CPAP   Factor V Leiden deficiency with history of deep vein thrombosis September 2012   Chronic anticoagulation with warfarin-satisfactory ; now Eliquis   Mental deficiency with satisfactory social situation, living in group home with 3 other adult males   Seizure disorder incompletely controlled with Tegretol by:lapse-like episodes        Plan:    Some concern of slow heart rate noticed at the group home  Holter monitor about August 20, 2021 showed sinus rhythm, no atrial fibrillation or complex atrial ectopy.  The average heart rate was a bit slow, 57 bpm but no long pauses or excessive bradycardia identified  Exam today is good with normal heart rate and blood pressure  Continue current medications no adjustments were made  No restrictions on physical activity-actually he should be encouraged to be more active, does not seem very motivated for physical activity  Return in 1 year or sooner as need arises        Subjective:    I had the opportunity to see.Lavon BRAYAN Iraheta , who is a 51 year old male with a known history of atrial arrhythmias  At the group home he was noted to have some heart rates in the 50s and there is concerned that he excessive bradycardia  No apparent symptoms  To monitor August 19, 2021 showed sinus rhythm with an average heart rate of 57 bpm  Some atrial ectopy was seen but there was no complex atrial  ectopy no atrial tachycardia or atrial fibrillation  No major bradycardia no pauses no AV block  Takes Eliquis 5 mg twice daily  Also metoprolol 25 mg twice daily  Does not do much activity, likes to watch TV and sit around.  Is encouraged to exercise  No complaints of chest pain breathlessness or fluid retention  No syncopal spells        Problem List:  Patient Active Problem List   Diagnosis     Paroxysmal atrial fibrillation (H)     Anticoagulation adequate     Medical History:  Past Medical History:   Diagnosis Date     GERD (gastroesophageal reflux disease)      Mental retardation      Seizure disorder (H)      Surgical History:  No past surgical history on file.  Social History:  Social History     Socioeconomic History     Marital status: Single     Spouse name: Not on file     Number of children: Not on file     Years of education: Not on file     Highest education level: Not on file   Occupational History     Not on file   Tobacco Use     Smoking status: Never Smoker     Smokeless tobacco: Never Used   Substance and Sexual Activity     Alcohol use: Not on file     Drug use: Not on file     Sexual activity: Not on file   Other Topics Concern     Not on file   Social History Narrative    He is a resident of a group home (12/22/2014)     Social Determinants of Health     Financial Resource Strain:      Difficulty of Paying Living Expenses:    Food Insecurity:      Worried About Running Out of Food in the Last Year:      Ran Out of Food in the Last Year:    Transportation Needs:      Lack of Transportation (Medical):      Lack of Transportation (Non-Medical):    Physical Activity:      Days of Exercise per Week:      Minutes of Exercise per Session:    Stress:      Feeling of Stress :    Social Connections:      Frequency of Communication with Friends and Family:      Frequency of Social Gatherings with Friends and Family:      Attends [employer] Services:      Active Member of Clubs or Organizations:       Attends Club or Organization Meetings:      Marital Status:    Intimate Partner Violence:      Fear of Current or Ex-Partner:      Emotionally Abused:      Physically Abused:      Sexually Abused:        Review of Systems   ,         Family History:  No family history on file.      Allergies:  Allergies   Allergen Reactions     Biaxin [Clarithromycin] Unknown     Codeine Unknown     Diphenhydramine Hcl [Diphenhydramine] Unknown     Levofloxacin Unknown     Morphine Unknown       Medications:  Current Outpatient Medications   Medication Sig Dispense Refill     ACETAMINOPHEN (TYLENOL ORAL) [ACETAMINOPHEN (TYLENOL ORAL)] Take 500 mg by mouth every 6 (six) hours as needed.        calcium carbonate (TUMS E-X) 300 mg (750 mg) Chew [CALCIUM CARBONATE (TUMS E-X) 300 MG (750 MG) CHEW] Take as directed       carBAMazepine (TEGRETOL  XR) 100 MG 12 hr tablet [CARBAMAZEPINE (TEGRETOL  XR) 100 MG 12 HR TABLET] Take 100 mg by mouth daily.       carBAMazepine (TEGRETOL XR) 200 MG 12 hr tablet [CARBAMAZEPINE (TEGRETOL XR) 200 MG 12 HR TABLET] Take 100 mg by mouth 2 (two) times a day.        CHOLECALCIFEROL, VITAMIN D3, (VITAMIN D3 ORAL) [CHOLECALCIFEROL, VITAMIN D3, (VITAMIN D3 ORAL)] Take 2,000 Units by mouth daily.        clotrimazole (LOTRIMIN) 1 % cream [CLOTRIMAZOLE (LOTRIMIN) 1 % CREAM] Apply 1 application topically daily.       ELIQUIS 5 mg Tab tablet [ELIQUIS 5 MG TAB TABLET] TAKE 1 TABLET BY MOUTH TWICE DAILY. 180 tablet 1     fluocinolone (SYNALAR) 0.025 % ointment [FLUOCINOLONE (SYNALAR) 0.025 % OINTMENT] Apply via cotton swab twice daily for 7 days and then PRN for itching.. 30 g 0     lamoTRIgine (LAMICTAL) 100 MG tablet [LAMOTRIGINE (LAMICTAL) 100 MG TABLET] Take 100 mg by mouth daily.        lamoTRIgine (LAMICTAL) 25 MG tablet [LAMOTRIGINE (LAMICTAL) 25 MG TABLET] Take 25 mg by mouth daily.        MEDICATION CANNOT BE REORDERED - PLEASE MANUALLY REORDER AND DISCONTINUE THE OLD ORDER [MELATONIN/PYRIDOXINE (MELATONIN,  WITH B6, ORAL)] Take by mouth.       METHYLCELLULOSE (CITRUCEL ORAL) [METHYLCELLULOSE (CITRUCEL ORAL)] Take 2 g by mouth daily.        METOPROLOL TARTRATE ORAL [METOPROLOL TARTRATE ORAL] Take 25 mg by mouth 2 times a day at 6:00 am and 4:00 pm.        triamcinolone (KENALOG) 0.1 % ointment [TRIAMCINOLONE (KENALOG) 0.1 % OINTMENT]          Objective:   Vital signs:  /68 (BP Location: Left arm, Patient Position: Sitting, Cuff Size: Adult Large)   Pulse 92   Resp 16   Wt 102.1 kg (225 lb)   SpO2 95%   BMI 30.94 kg/m        Physical Exam:  Pulse is 80 and regular without ectopy      GENERAL APPEARANCE: Alert, cooperative and in no acute distress.  HEENT: No scleral icterus. No Xanthelasma. Oral mucous membranes pink and moist.  NECK: JVP  Difficult to determine cm. No Hepatojugular reflux. Thyroid not  Palpable  CHEST: clear to auscultation and percussion  CARDIOVASCULAR: S1, S2 without murmur    Brachial, radial  pulses are intact and symmetric.   No carotid bruits noted.  ABDOMEN: Non tender. BS+. No bruits.  EXTREMITIES: No cyanosis, clubbing or edema.    Lab Results:  LIPIDS:  Lab Results   Component Value Date    CHOL 183 11/12/2019    CHOL 223 (H) 10/16/2018    CHOL 220 (H) 01/29/2016     Lab Results   Component Value Date    HDL 33 (L) 11/12/2019    HDL 31 (L) 10/16/2018    HDL 31 (L) 01/29/2016     No components found for: LDLCALC  Lab Results   Component Value Date    TRIG 159 (H) 11/12/2019    TRIG 588 (H) 10/16/2018    TRIG 382 (H) 01/29/2016     No components found for: CHOLHDL    BMP:  Lab Results   Component Value Date    BUN 19 05/18/2020     05/18/2020    CO2 23 05/18/2020         This note has been dictated using voice recognition software. Any grammatical or context distortions are unintentional and inherent to the software.  Jeevan Adamson MD  [facility]  [phone]

## 2021-10-25 ENCOUNTER — LAB REQUISITION (OUTPATIENT)
Dept: LAB | Facility: CLINIC | Age: 51
End: 2021-10-25
Payer: MEDICARE

## 2021-10-25 ENCOUNTER — TRANSFERRED RECORDS (OUTPATIENT)
Dept: HEALTH INFORMATION MANAGEMENT | Facility: CLINIC | Age: 51
End: 2021-10-25

## 2021-10-25 DIAGNOSIS — G40.209 LOCALIZATION-RELATED (FOCAL) (PARTIAL) SYMPTOMATIC EPILEPSY AND EPILEPTIC SYNDROMES WITH COMPLEX PARTIAL SEIZURES, NOT INTRACTABLE, WITHOUT STATUS EPILEPTICUS (H): ICD-10-CM

## 2021-10-25 DIAGNOSIS — E78.5 HYPERLIPIDEMIA, UNSPECIFIED: ICD-10-CM

## 2021-10-25 LAB
ALBUMIN SERPL-MCNC: 4.1 G/DL (ref 3.5–5)
ALP SERPL-CCNC: 79 U/L (ref 45–120)
ALT SERPL W P-5'-P-CCNC: 19 U/L (ref 0–45)
ANION GAP SERPL CALCULATED.3IONS-SCNC: 11 MMOL/L (ref 5–18)
AST SERPL W P-5'-P-CCNC: 16 U/L (ref 0–40)
BILIRUB SERPL-MCNC: 0.3 MG/DL (ref 0–1)
BUN SERPL-MCNC: 19 MG/DL (ref 8–22)
CALCIUM SERPL-MCNC: 9.8 MG/DL (ref 8.5–10.5)
CHLORIDE BLD-SCNC: 108 MMOL/L (ref 98–107)
CHOLEST SERPL-MCNC: 194 MG/DL
CO2 SERPL-SCNC: 25 MMOL/L (ref 22–31)
CREAT SERPL-MCNC: 1.05 MG/DL (ref 0.7–1.3)
GFR SERPL CREATININE-BSD FRML MDRD: 82 ML/MIN/1.73M2
GLUCOSE BLD-MCNC: 111 MG/DL (ref 70–125)
HDLC SERPL-MCNC: 33 MG/DL
LDLC SERPL CALC-MCNC: ABNORMAL MG/DL
POTASSIUM BLD-SCNC: 4 MMOL/L (ref 3.5–5)
PROT SERPL-MCNC: 7.2 G/DL (ref 6–8)
SODIUM SERPL-SCNC: 144 MMOL/L (ref 136–145)
TRIGL SERPL-MCNC: 470 MG/DL

## 2021-10-25 PROCEDURE — 80061 LIPID PANEL: CPT | Mod: ORL | Performed by: FAMILY MEDICINE

## 2021-10-25 PROCEDURE — 80175 DRUG SCREEN QUAN LAMOTRIGINE: CPT | Mod: ORL | Performed by: FAMILY MEDICINE

## 2021-10-25 PROCEDURE — 80053 COMPREHEN METABOLIC PANEL: CPT | Mod: ORL | Performed by: FAMILY MEDICINE

## 2021-10-25 PROCEDURE — 83721 ASSAY OF BLOOD LIPOPROTEIN: CPT | Mod: ORL,91 | Performed by: FAMILY MEDICINE

## 2021-10-26 ENCOUNTER — MEDICAL CORRESPONDENCE (OUTPATIENT)
Dept: HEALTH INFORMATION MANAGEMENT | Facility: CLINIC | Age: 51
End: 2021-10-26

## 2021-10-26 LAB — LDLC SERPL CALC-MCNC: 122 MG/DL

## 2021-10-27 LAB — LAMOTRIGINE SERPL-MCNC: 6.8 UG/ML

## 2021-11-29 ENCOUNTER — HOSPITAL ENCOUNTER (OUTPATIENT)
Dept: NUTRITION | Facility: HOSPITAL | Age: 51
Discharge: HOME | End: 2021-11-29
Admitting: FAMILY MEDICINE
Payer: MEDICARE

## 2021-11-29 PROCEDURE — 97802 MEDICAL NUTRITION INDIV IN: CPT | Mod: TEL,GZ

## 2021-11-29 PROCEDURE — 97802 MEDICAL NUTRITION INDIV IN: CPT | Mod: TEL

## 2021-11-30 NOTE — PROGRESS NOTES
"[facility] NUTRITION SERVICES  Medical Nutrition Therapy    Visit Type:   Billable Telephone visit  Initial Assessment  Call duration 45 minutes    Lavon Iraheta, 51 year old referred by PCP for MNT related to menu planning  Patient is a group home resident. RD spoke with patient's mother/guardian Becca Bowman  The main concern is patient is experiencing intermittent explosive diarrhea. Becca Bowman feels it is related to some food items provided at the group home. When patient is home on the weekends and eating foods prepared and served at home his stools are usually normal.    RD recorded patient's specific dietary needs as related by patient's mother who is pt's caregiver/guardian       Nutrition Assessment:  Anthropometrics  Height:   Ht Readings from Last 1 Encounters:   12/07/20 1.816 m (5' 11.5\")         BMI: 31.0   Weight Status:  Obesity Grade I BMI 30-34.9   Weight:   Wt Readings from Last 1 Encounters:   08/30/21 102.1 kg (225 lb)       UBW: 225 to 235 lbs  Wt Readings from Last 4 Encounters:   08/30/21 102.1 kg (225 lb)   12/07/20 106.1 kg (234 lb)   12/17/19 103 kg (227 lb)   01/09/19 105.7 kg (233 lb)         IBW: 175 lbs IBW %: 128%        Labs:      Ref. Range 10/25/2021 16:18   Triglycerides Latest Ref Range: <=149 mg/dL 470 (H)     Supplements:   RD recommends Vit D    Social/Environmental:   Patient is resident at Group home     Food Record:    -3 meals per day (meal rotation of standard menu items) provided at Group home    Nutritional Details:   -Food allergies: None listed  -GI: Two BM daily with occasional  intermittent Loose stools  -Appetite: Good    Physical Activity:  Low activity level    ASSESSED MALNUTRITION STATUS  % Weight Loss:  None noted  % Intake:  No decreased intake noted  Subcutaneous Fat Loss:  None reported  Muscle Loss:  None reported  Fluid Retention:  None noted    Malnutrition Diagnosis:  Patient does not meet two of the above criteria necessary for diagnosing " malnutrition    Nutrition Diagnosis:  Altered GI function related to specific food intolerances (mostly excess fiber consumption) as evidenced by intermittent episodes of explosive diarrhea.     Nutrition Intervention:  RD recorded patient's specific dietary needs as related by patient's mother who is pt's caregiver/guardian      RD prepared and Provided the following handouts:   Food listing Lavon's food likes/dislikes (intolerances) as reference for group home staff     Dosing weight: 85 kg  (adj wt)  Nutrition Prescription: Macronutrient and Micronutrient details   Energy: 2125 kcals/day   (25 Kcal/Kg)    Justification:  (maintenance)  Low activity level   Protein: 105 g Pro/day   (1.25 g pro/Kg)    Justification:  (maintenance)   Fluid: 91133 mL/day   (30 mL/kg) = 11 cups fluid/day     Justification:  (maintenance)   Fat: Choose healthy fats Olive Oil, Olives, Avocado, Nuts, nut butters, seeds          Carbohydrate:  Starches, fruit, starchy vegetables, occasional dessert    Fiber: Men (>50 years): 30 grams per day     NOTE:   Too much fiber tends to exacerbate diarrhea and gas   In Lavon's case         Micronutrient: Multivitamin            Vitamin and Mineral Supplements: Vitamin D     Patient Engagement:   Assessed learning needs and learning preference: Yes.  Teaching Method(s) used: Explanation with written suggested Menu modifications sent in the mail to Group home    Nutrition Education (Application):  a)  Discussed current menu  / recommended some alternative food choices to reduce likelihood of loose stools related to food    b)  Patient's mother verbalizes understanding of diet by reiterating information presented & asking appropriate nutrition related questions.  Anticipate good compliance   Stage of Change:  preparation    Nutrition Goals:    Breakfast meal rotation is fine when serving eggs allow 2 eggs as portion     Lunch & supper meals to include hearty portion of meat with starch / roll and  1/2 cup cooked vegetable or plain salad     staff please reference Listing of [place] food likes/dislikes (intolerances) when preparing [place] meals    RD recommends 5,000 international units Vitamin D daily (20 international unit(s) per pound        Nutrition Follow Up / Monitoring:   Food intake, Fluid/beverage intake, Weight and Biochemical data    Nutrition Recommendations:  Patient to follow-up with RD PRN per MD referral  Patient's Mother/guardian has RD contact information to call/e-mail if needed.    Rose [email or web address]  [phone]    Start time:  3:30 pm  End time:   4:15 pm    Total Time Duration: 45 minutes

## 2021-12-02 ENCOUNTER — VIRTUAL VISIT (OUTPATIENT)
Dept: GASTROENTEROLOGY | Facility: CLINIC | Age: 51
End: 2021-12-02
Payer: MEDICARE

## 2021-12-02 DIAGNOSIS — R19.7 DIARRHEA: ICD-10-CM

## 2021-12-02 DIAGNOSIS — R19.5 LOOSE STOOLS: Primary | ICD-10-CM

## 2021-12-02 DIAGNOSIS — R10.9 STOMACHACHE: ICD-10-CM

## 2021-12-02 PROCEDURE — 97802 MEDICAL NUTRITION INDIV IN: CPT | Mod: GZ | Performed by: DIETITIAN, REGISTERED

## 2021-12-02 NOTE — LETTER
12/2/2021         RE: Lavon Iraheta  [address]  [facility] [address]        Dear Colleague,    Thank you for referring your patient, Lavon Iraheta, to the [facility]. Please see a copy of my visit note below.  [facility] Outpatient Medical Nutrition Therapy      Time Spent:  35 minutes  Session Type:  Initial   Referring Physician:  Guille Brooks MD  Reason for RD Visit:   Nutritional counseling     Nutrition Assessment:  Patient is a 51 year old male with history that includes GERD, epilepsy, aflutter/fib, DRAKE on CPAP, factor V Leiden mutation and previous DVT. Pt has been having more issues with explosive diarrhea and some fecal incontinence reported occasionally happening at work. MD noted [facility] has been serving more fruits and vegetables and pt has noticed more loose stools. RD in a different clinic had a visit with/for patient earlier this week and discussed some tips/suggestions for diet. Was uncertain if needing a second visit, but  discussed with pt's mother, and his mother did want visit for discussion to assist group home with ideas and suggestions to help decrease issues with loose stools.    Spoke with Ronna from [facility] who stated that Lavon has had some issues at work with explosive diarrhea and some fecal incontinence now as well.  She stated that over the years Lavon has always had looser softer stools and never noticed formed stool.  A couple of times a month he may have some constipation and then they will give him some Metamucil on those occasions but does not take regularly.  Ronna stated that after work, he may come home and complain of a stomachache and typically needs to have a BM after work. He reported feeling better after having this bowel movement. At the group home, they haven't noticed fecal incontinence except one time he didn't make it to the bathroom quick enough. They deny any blood in stool and  "he does not have to get up at night to have bowel movement either.    He tends to eat 3 meals a day and 2 snacks.  He drinks 3 cups of fair life milk per day. At lunch meal he has chocolate fair life lactose free milk and regular milk with breakfast and dinner meal.  He drinks one mini, 7.5 ounce can of Sprite or root beer or Shea dry gingerale soda per day and 1 cup of herbal tea after work.  Does not drink any alcohol and he cannot have caffeine.  He keeps some M&Ms in his room and will snack on some or bring some to work, but group home reported that he typically does not eat a large portion of them.  Ronna stated that they serve healthy food choices in the group home, but will have some sweetened snacks such as rice crispy treats occasional ice cream or pudding or homemade cookies or also some Scoopy doo quinton cracker snacks. Otherwise may have fruit or nuts as night time snack. He does not have lactose intolerance that they know of, just drinks the Fairlife milk 3x/day but will have ice cream sometimes. Ronna stated that they tend to have salads often. Lavon like his mini can of soda, so likely will want to continue to drink this but likes regular milk so can switch to regular lactose free milk vs chocolate at work. Also based on diet recall, he tends to have some M&Ms along with his lunch meal. He drinks about 24 oz bottle of water per day.     Height:   Ht Readings from Last 1 Encounters:   12/07/20 1.816 m (5' 11.5\")     Weight:  Wt Readings from Last 10 Encounters:   08/30/21 102.1 kg (225 lb)   12/07/20 106.1 kg (234 lb)   12/17/19 103 kg (227 lb)   01/09/19 105.7 kg (233 lb)   10/15/18 105.7 kg (233 lb)   07/13/18 101.6 kg (224 lb)   02/21/18 102 kg (224 lb 12.8 oz)   03/15/17 101.2 kg (223 lb)   09/17/15 99.2 kg (218 lb 9.6 oz)   02/27/15 99.8 kg (220 lb)        BMI: Estimated body mass index is 30.94 kg/m  as calculated from the following:    Height as of 12/7/20: 1.816 m (5' 11.5\").    Weight as " of 8/30/21: 102.1 kg (225 lb).    Diet Recall:  (some usual/recent meals/snacks/beverages):  Meal Food    Breakfast 1 packet of peaches and cream oatmeal or raisin and spice oatmeal, banana or pancakes with SF syrup and sausage or 1-2 eggs as part of a meal or corn flakes   Lunch Jambalaya, pineapple, fudge brownie M&Ms with chocolate milk OR at work Ham or turkey or peanut butter sandwich with a fruit (such as orange, grapes, strawberries, blueberries), a vegetables, cheese stick and M&Ms   Dinner PB sandwich last night. Did not discuss other specific meals but reported that they tend to have salads along with dinner meal   Snacks 2 snacks per day: rice krispy treat or david doo quinton snacks or pudding or homemade cookies or nuts or fruit and last night ice cream   Beverages Drink a cup of regular Fairlife milk with breakfast and dinner and fairlife chocolate milk with lunch, ~24 oz water and 7.5 oz can valentin mist, root beer and 1 c herbal tea after work.   Alcohol Intake none      Labs:    Last Comprehensive Metabolic Panel:  Sodium   Date Value Ref Range Status   10/25/2021 144 136 - 145 mmol/L Final     Potassium   Date Value Ref Range Status   10/25/2021 4.0 3.5 - 5.0 mmol/L Final     Chloride   Date Value Ref Range Status   10/25/2021 108 (H) 98 - 107 mmol/L Final     Carbon Dioxide (CO2)   Date Value Ref Range Status   10/25/2021 25 22 - 31 mmol/L Final     Anion Gap   Date Value Ref Range Status   10/25/2021 11 5 - 18 mmol/L Final     Glucose   Date Value Ref Range Status   10/25/2021 111 70 - 125 mg/dL Final     Urea Nitrogen   Date Value Ref Range Status   10/25/2021 19 8 - 22 mg/dL Final     Creatinine   Date Value Ref Range Status   10/25/2021 1.05 0.70 - 1.30 mg/dL Final     GFR Estimate   Date Value Ref Range Status   10/25/2021 82 >60 mL/min/1.73m2 Final     Comment:     As of July 11, 2021, eGFR is calculated by the CKD-EPI creatinine equation, without race adjustment. eGFR can be influenced by  muscle mass, exercise, and diet. The reported eGFR is an estimation only and is only applicable if the renal function is stable.   05/18/2020 >60 >60 mL/min/1.73m2 Final     Calcium   Date Value Ref Range Status   10/25/2021 9.8 8.5 - 10.5 mg/dL Final     CBC RESULTS:   Recent Labs   Lab Test 05/18/20  1605   WBC 7.3   RBC 4.26*   HGB 13.9*   HCT 42.3   MCV 99   MCH 32.6   MCHC 32.9   RDW 13.3   *       Pertinent Medications/vitamin and mineral supplements:      Current Outpatient Medications   Medication     ACETAMINOPHEN (TYLENOL ORAL)     calcium carbonate (TUMS E-X) 300 mg (750 mg) Chew     carBAMazepine (TEGRETOL  XR) 100 MG 12 hr tablet     carBAMazepine (TEGRETOL XR) 200 MG 12 hr tablet     CHOLECALCIFEROL, VITAMIN D3, (VITAMIN D3 ORAL)     clotrimazole (LOTRIMIN) 1 % cream     ELIQUIS 5 mg Tab tablet     fluocinolone (SYNALAR) 0.025 % ointment     lamoTRIgine (LAMICTAL) 100 MG tablet     lamoTRIgine (LAMICTAL) 25 MG tablet     MEDICATION CANNOT BE REORDERED - PLEASE MANUALLY REORDER AND DISCONTINUE THE OLD ORDER     METHYLCELLULOSE (CITRUCEL ORAL)     METOPROLOL TARTRATE ORAL     triamcinolone (KENALOG) 0.1 % ointment     No current facility-administered medications for this visit.     Food Allergies:  NKFA     MALNUTRITION:  % Weight Loss:  None noted  % Intake:  No decreased intake noted  Subcutaneous Fat Loss:  Unable to assess  Muscle Loss:  Unable to assess  Fluid Retention:  None noted    Malnutrition Diagnosis: Patient does not meet two of the above criteria necessary for diagnosing malnutrition  In Context of:  Acute illness or injury  Chronic illness or disease  Environmental or social circumstances    Nutrition Diagnosis:    Altered GI function related to loose stools and diarrhea and sometimes fecal continence as evidenced by pt/family and group home report.    Food and nutrition related knowledge deficit related to lack of previous diet education/lack of complete recall of previous diet  education as evidenced by pt report and interest in diet education/review.    Nutrition Prescription: continue 3 meals +2 snacks per day that are lower in added sugars, foods with more soluble fiber, symptom journals.    Nutrition Intervention:    Nutrition Education/Counseling:  Provided diet education with tips and suggestions for meals and snacks. Explained some potential food and beverage triggers for loose stool/diarrhea to including added sugars, especially those higher in fructose such as HFCS, honey and agave syrup, juice and some fruits such as apples and pears, and high fat diet. Discussed some specific tips and food substitutions for getting less added sugar in diet. Explained that foods with some soluble fiber may be better tolerated and explained if plan is to consistently take metamucil then recommendation would be to gradually intake to suggested dose on packaging and not to add too much fiber too quickly. Also discussed adequate hydration with recommendation to drink at least 48-64 oz fluids especially increase water per day such as drinking 2 of his water bottles per day. Recommended if possible to continue to keep food and beverage journals for at least a couple of weeks and write down any symptoms he experiences to see if they can find any specific food and beverages triggering symptoms or any patterns of eating causing symptoms. Explained that a food may not cause an immediate issues so important to see over time if they notice a pattern with eating something. Explained versus eliminating upfront, it's helpful to journal to see if he suspects something is a trigger and then can eliminate for a couple of week at that time to see if improvement in symptoms then. Explained that since his loose stools are worsening and having some fecal incontinence, recommend speaking with his PCP about possible referral to GI doctor and/or obtaining some additional labs such as IgA to rule out celiac as one  example.     Educational Materials Provided:  [facility] soluble and insoluble fiber and IBS nutrition therapy    Patient verbalized understanding of education provided. See Goals below.     Goals:  1. Decrease the amount of added sugars in your diet especially honey, high fructose corn syrup that in found in M&Ms but all added sugars may contribute to looser stools.     --Also avoid sugar alcohols which are in some sugar free products such as sorbitol, xylitol, erythritol, mannitol. Double check that the sugar free syrup that you are using does not contain any sugar alcohols.     --If having oatmeal for breakfast, choose a low sugar option (Religion makes a low sugar one) or plain oatmeal and add in some banana or berries to merry it naturally.    --Discontinue drinking chocolate milk at work and can have regular low fat Fairlife milk instead or water.    --Do not bring a sweetened snack/dessert in lunch at work (since this seems to be the time when you have more issues with stool incontinence).    --For evening snack recommend having some nuts and fruit.    --Recommend avoiding any desserts or snacks that have high fructose corn syrup such as M&Ms.    2. Foods with some soluble fiber may be better tolerated. Some examples include oats, oatmeal, barley, sweet potato, potato, avocado, ground flaxseed, berries, carrots, brussels sprouts, nuts especially almonds, black beans and kidney beans (note canned beans may be less gas producing that dried beans). I also included a handout with some ideas.  --You may find cooked vegetables to be better tolerated.    3. Be careful not to eat large loads at one time of fiber, especially insoluble fibers/roughage such as lettuce, corn, popcorn, peels on fruits and vegetables.     4. For at least 2 weeks, keep daily food and beverage journals and track your symptoms to see if you can identify any specific food or beverages that trigger symptoms or any patterns of eating/drinking  contributing to symptoms. For example on the nights you have ice cream for dessert, do you seem to have more looser stools or stool incontinence at work the next day.    --Depending on if you find any potential trigger foods and beverages, then you could try eliminating those foods/beverages that you suspect might be an issue for a couple of weeks to see if any improvement in symptoms with eliminating them.     5. Speak with your primary care doctor to see if he would like to do some further testing or if he agrees to refer you to see a gastroenterologist due to having more incontinent stools.    Nutrition Monitoring and Evaluation: Will monitor adherence to nutrition recommendations at future RD visits.     Further Medical Nutrition Therapy:  Follow-up as needed.    Patient was encouraged to call/contact RD with any further questions.    Katty Herrera, MS, RD, LD          Again, thank you for allowing me to participate in the care of your patient.      Sincerely,    Katty Herrera RD

## 2021-12-02 NOTE — PROGRESS NOTES
"Lavon Iraheta is a 51 year old male who is being evaluated via a billable telephone visit.     The patient has been notified of following:     \"This telephone visit will be conducted via a call between you and your physician/provider. We have found that certain health care needs can be provided without the need for a physical exam.  This service lets us provide the care you need with a short phone conversation.  If a prescription is necessary we can send it directly to your pharmacy.  If lab work is needed we can place an order for that and you can then stop by our lab to have the test done at a later time.    Telephone visits are billed at different rates depending on your insurance coverage. During this emergency period, for some insurers they may be billed the same as an in-person visit.  Please reach out to your insurance provider with any questions.    If during the course of the call the physician/provider feels a telephone visit is not appropriate, you will not be charged for this service.\"    Patient has given verbal consent for Telephone visit?  Yes  Note: scheduled as video visit. Sent link to number in appt note but video and audio did not connect so converted to telephone visit.    How would you like to obtain your AVS? Mail a copy    Phone call duration: 35 minutes    During this virtual visit the patient is located in [place], patient verifies this as the location during the entirety of this visit.     [facility] Outpatient Medical Nutrition Therapy      Time Spent:  35 minutes  Session Type:  Initial   Referring Physician:  Guille Brooks MD  Reason for RD Visit:   Nutritional counseling     Nutrition Assessment:  Patient is a 51 year old male with history that includes GERD, epilepsy, aflutter/fib, DRAKE on CPAP, factor V Leiden mutation and previous DVT. Pt has been having more issues with explosive diarrhea and some fecal incontinence reported occasionally happening at work. MD noted  has " been serving more fruits and vegetables and pt has noticed more loose stools. RD in a different clinic had a visit with/for patient earlier this week and discussed some tips/suggestions for diet. Was uncertain if needing a second visit, but  discussed with pt's mother, and his mother did want visit for discussion to assist group home with ideas and suggestions to help decrease issues with loose stools.    Spoke with Ronna from long-term who stated that Lavon has had some issues at work with explosive diarrhea and some fecal incontinence now as well.  She stated that over the years Lavon has always had looser softer stools and never noticed formed stool.  A couple of times a month he may have some constipation and then they will give him some Metamucil on those occasions but does not take regularly.  Ronna stated that after work, he may come home and complain of a stomachache and typically needs to have a BM after work. He reported feeling better after having this bowel movement. At the group home, they haven't noticed fecal incontinence except one time he didn't make it to the bathroom quick enough. They deny any blood in stool and he does not have to get up at night to have bowel movement either.    He tends to eat 3 meals a day and 2 snacks.  He drinks 3 cups of fair life milk per day. At lunch meal he has chocolate fair life lactose free milk and regular milk with breakfast and dinner meal.  He drinks one mini, 7.5 ounce can of Sprite or root beer or Shea dry gingerale soda per day and 1 cup of herbal tea after work.  Does not drink any alcohol and he cannot have caffeine.  He keeps some M&Ms in his room and will snack on some or bring some to work, but group home reported that he typically does not eat a large portion of them.  Ronna stated that they serve healthy food choices in the group home, but will have some sweetened snacks such as rice crispy treats occasional ice cream or  "pudding or homemade cookies or also some Scoopy doo quinton cracker snacks. Otherwise may have fruit or nuts as night time snack. He does not have lactose intolerance that they know of, just drinks the Fairlife milk 3x/day but will have ice cream sometimes. Ronna stated that they tend to have salads often. Lavon like his mini can of soda, so likely will want to continue to drink this but likes regular milk so can switch to regular lactose free milk vs chocolate at work. Also based on diet recall, he tends to have some M&Ms along with his lunch meal. He drinks about 24 oz bottle of water per day.     Height:   Ht Readings from Last 1 Encounters:   12/07/20 1.816 m (5' 11.5\")     Weight:  Wt Readings from Last 10 Encounters:   08/30/21 102.1 kg (225 lb)   12/07/20 106.1 kg (234 lb)   12/17/19 103 kg (227 lb)   01/09/19 105.7 kg (233 lb)   10/15/18 105.7 kg (233 lb)   07/13/18 101.6 kg (224 lb)   02/21/18 102 kg (224 lb 12.8 oz)   03/15/17 101.2 kg (223 lb)   09/17/15 99.2 kg (218 lb 9.6 oz)   02/27/15 99.8 kg (220 lb)        BMI: Estimated body mass index is 30.94 kg/m  as calculated from the following:    Height as of 12/7/20: 1.816 m (5' 11.5\").    Weight as of 8/30/21: 102.1 kg (225 lb).    Diet Recall:  (some usual/recent meals/snacks/beverages):  Meal Food    Breakfast 1 packet of peaches and cream oatmeal or raisin and spice oatmeal, banana or pancakes with SF syrup and sausage or 1-2 eggs as part of a meal or corn flakes   Lunch Jambalaya, pineapple, fudge brownie M&Ms with chocolate milk OR at work Ham or turkey or peanut butter sandwich with a fruit (such as orange, grapes, strawberries, blueberries), a vegetables, cheese stick and M&Ms   Dinner PB sandwich last night. Did not discuss other specific meals but reported that they tend to have salads along with dinner meal   Snacks 2 snacks per day: rice krispy treat or david doo quinton snacks or pudding or homemade cookies or nuts or fruit and last night ice " cream   Beverages Drink a cup of regular Fairlife milk with breakfast and dinner and fairlife chocolate milk with lunch, ~24 oz water and 7.5 oz can valentin mist, root beer and 1 c herbal tea after work.   Alcohol Intake none      Labs:    Last Comprehensive Metabolic Panel:  Sodium   Date Value Ref Range Status   10/25/2021 144 136 - 145 mmol/L Final     Potassium   Date Value Ref Range Status   10/25/2021 4.0 3.5 - 5.0 mmol/L Final     Chloride   Date Value Ref Range Status   10/25/2021 108 (H) 98 - 107 mmol/L Final     Carbon Dioxide (CO2)   Date Value Ref Range Status   10/25/2021 25 22 - 31 mmol/L Final     Anion Gap   Date Value Ref Range Status   10/25/2021 11 5 - 18 mmol/L Final     Glucose   Date Value Ref Range Status   10/25/2021 111 70 - 125 mg/dL Final     Urea Nitrogen   Date Value Ref Range Status   10/25/2021 19 8 - 22 mg/dL Final     Creatinine   Date Value Ref Range Status   10/25/2021 1.05 0.70 - 1.30 mg/dL Final     GFR Estimate   Date Value Ref Range Status   10/25/2021 82 >60 mL/min/1.73m2 Final     Comment:     As of July 11, 2021, eGFR is calculated by the CKD-EPI creatinine equation, without race adjustment. eGFR can be influenced by muscle mass, exercise, and diet. The reported eGFR is an estimation only and is only applicable if the renal function is stable.   05/18/2020 >60 >60 mL/min/1.73m2 Final     Calcium   Date Value Ref Range Status   10/25/2021 9.8 8.5 - 10.5 mg/dL Final     CBC RESULTS:   Recent Labs   Lab Test 05/18/20  1605   WBC 7.3   RBC 4.26*   HGB 13.9*   HCT 42.3   MCV 99   MCH 32.6   MCHC 32.9   RDW 13.3   *       Pertinent Medications/vitamin and mineral supplements:      Current Outpatient Medications   Medication     ACETAMINOPHEN (TYLENOL ORAL)     calcium carbonate (TUMS E-X) 300 mg (750 mg) Chew     carBAMazepine (TEGRETOL  XR) 100 MG 12 hr tablet     carBAMazepine (TEGRETOL XR) 200 MG 12 hr tablet     CHOLECALCIFEROL, VITAMIN D3, (VITAMIN D3 ORAL)      clotrimazole (LOTRIMIN) 1 % cream     ELIQUIS 5 mg Tab tablet     fluocinolone (SYNALAR) 0.025 % ointment     lamoTRIgine (LAMICTAL) 100 MG tablet     lamoTRIgine (LAMICTAL) 25 MG tablet     MEDICATION CANNOT BE REORDERED - PLEASE MANUALLY REORDER AND DISCONTINUE THE OLD ORDER     METHYLCELLULOSE (CITRUCEL ORAL)     METOPROLOL TARTRATE ORAL     triamcinolone (KENALOG) 0.1 % ointment     No current facility-administered medications for this visit.     Food Allergies:  NKFA     MALNUTRITION:  % Weight Loss:  None noted  % Intake:  No decreased intake noted  Subcutaneous Fat Loss:  Unable to assess  Muscle Loss:  Unable to assess  Fluid Retention:  None noted    Malnutrition Diagnosis: Patient does not meet two of the above criteria necessary for diagnosing malnutrition  In Context of:  Acute illness or injury  Chronic illness or disease  Environmental or social circumstances    Nutrition Diagnosis:    Altered GI function related to loose stools and diarrhea and sometimes fecal continence as evidenced by pt/family and group home report.    Food and nutrition related knowledge deficit related to lack of previous diet education/lack of complete recall of previous diet education as evidenced by pt report and interest in diet education/review.    Nutrition Prescription: continue 3 meals +2 snacks per day that are lower in added sugars, foods with more soluble fiber, symptom journals.    Nutrition Intervention:    Nutrition Education/Counseling:  Provided diet education with tips and suggestions for meals and snacks. Explained some potential food and beverage triggers for loose stool/diarrhea to including added sugars, especially those higher in fructose such as HFCS, honey and agave syrup, juice and some fruits such as apples and pears, and high fat diet. Discussed some specific tips and food substitutions for getting less added sugar in diet. Explained that foods with some soluble fiber may be better tolerated and  explained if plan is to consistently take metamucil then recommendation would be to gradually intake to suggested dose on packaging and not to add too much fiber too quickly. Also discussed adequate hydration with recommendation to drink at least 48-64 oz fluids especially increase water per day such as drinking 2 of his water bottles per day. Recommended if possible to continue to keep food and beverage journals for at least a couple of weeks and write down any symptoms he experiences to see if they can find any specific food and beverages triggering symptoms or any patterns of eating causing symptoms. Explained that a food may not cause an immediate issues so important to see over time if they notice a pattern with eating something. Explained versus eliminating upfront, it's helpful to journal to see if he suspects something is a trigger and then can eliminate for a couple of week at that time to see if improvement in symptoms then. Explained that since his loose stools are worsening and having some fecal incontinence, recommend speaking with his PCP about possible referral to GI doctor and/or obtaining some additional labs such as IgA to rule out celiac as one example.     Educational Materials Provided:  [facility] soluble and insoluble fiber and IBS nutrition therapy    Patient verbalized understanding of education provided. See Goals below.     Goals:  1. Decrease the amount of added sugars in your diet especially honey, high fructose corn syrup that in found in M&Ms but all added sugars may contribute to looser stools.     --Also avoid sugar alcohols which are in some sugar free products such as sorbitol, xylitol, erythritol, mannitol. Double check that the sugar free syrup that you are using does not contain any sugar alcohols.     --If having oatmeal for breakfast, choose a low sugar option ([employer] makes a low sugar one) or plain oatmeal and add in some banana or berries to merry it naturally.    --Discontinue  drinking chocolate milk at work and can have regular low fat Fairlife milk instead or water.    --Do not bring a sweetened snack/dessert in lunch at work (since this seems to be the time when you have more issues with stool incontinence).    --For evening snack recommend having some nuts and fruit.    --Recommend avoiding any desserts or snacks that have high fructose corn syrup such as M&Ms.    2. Foods with some soluble fiber may be better tolerated. Some examples include oats, oatmeal, barley, sweet potato, potato, avocado, ground flaxseed, berries, carrots, brussels sprouts, nuts especially almonds, black beans and kidney beans (note canned beans may be less gas producing that dried beans). I also included a handout with some ideas.  --You may find cooked vegetables to be better tolerated.    3. Be careful not to eat large loads at one time of fiber, especially insoluble fibers/roughage such as lettuce, corn, popcorn, peels on fruits and vegetables.     4. For at least 2 weeks, keep daily food and beverage journals and track your symptoms to see if you can identify any specific food or beverages that trigger symptoms or any patterns of eating/drinking contributing to symptoms. For example on the nights you have ice cream for dessert, do you seem to have more looser stools or stool incontinence at work the next day.    --Depending on if you find any potential trigger foods and beverages, then you could try eliminating those foods/beverages that you suspect might be an issue for a couple of weeks to see if any improvement in symptoms with eliminating them.     5. Speak with your primary care doctor to see if he would like to do some further testing or if he agrees to refer you to see a gastroenterologist due to having more incontinent stools.    Nutrition Monitoring and Evaluation: Will monitor adherence to nutrition recommendations at future RD visits.     Further Medical Nutrition Therapy:  Follow-up as  needed.    Patient was encouraged to call/contact RD with any further questions.    Katty Herrera, MS, RD, LD

## 2021-12-03 ENCOUNTER — LAB REQUISITION (OUTPATIENT)
Dept: LAB | Facility: CLINIC | Age: 51
End: 2021-12-03
Payer: MEDICARE

## 2021-12-03 DIAGNOSIS — J02.9 ACUTE PHARYNGITIS, UNSPECIFIED: ICD-10-CM

## 2021-12-03 PROCEDURE — U0005 INFEC AGEN DETEC AMPLI PROBE: HCPCS | Mod: ORL | Performed by: FAMILY MEDICINE

## 2021-12-03 PROCEDURE — 87205 SMEAR GRAM STAIN: CPT | Mod: ORL | Performed by: FAMILY MEDICINE

## 2021-12-03 NOTE — PATIENT INSTRUCTIONS
It was nice speaking with you today. Below are the nutrition recommendations we discussed at your visit.    Please let me know if you have any additional questions.    Nutrition Recommendations    1. Decrease the amount of added sugars in your diet especially honey, high fructose corn syrup that in found in M&Ms but all added sugars may contribute to looser stools.     --Also avoid sugar alcohols which are in some sugar free products such as sorbitol, xylitol, erythritol, mannitol. Double check that the sugar free syrup that you are using does not contain any sugar alcohols.     --If having oatmeal for breakfast, choose a low sugar option ([employer] makes a low sugar one) or plain oatmeal and add in some banana or berries to merry it naturally.    --Discontinue drinking chocolate milk at work and can have regular low fat Fairlife milk instead or water.    --Do not bring a sweetened snack/dessert in lunch at work (since this seems to be the time when you have more issues with stool incontinence).    --For evening snack recommend having some nuts and fruit.    --Recommend avoiding any desserts or snacks that have high fructose corn syrup such as M&Ms.    2. Foods with some soluble fiber may be better tolerated. Some examples include oats, oatmeal, barley, sweet potato, potato, avocado, ground flaxseed, berries, carrots, brussels sprouts, nuts especially almonds, black beans and kidney beans (note canned beans may be less gas producing that dried beans). I also included a handout with some ideas.  --You may find cooked vegetables to be better tolerated.    3. Be careful not to eat large loads at one time of fiber, especially insoluble fibers/roughage such as lettuce, corn, popcorn, peels on fruits and vegetables.     4. For at least 2 weeks, keep daily food and beverage journals and track your symptoms to see if you can identify any specific food or beverages that trigger symptoms or any patterns of eating/drinking  contributing to symptoms. For example on the nights you have ice cream for dessert, do you seem to have more looser stools or stool incontinence at work the next day.    --Depending on if you find any potential trigger foods and beverages, then you could try eliminating those foods/beverages that you suspect might be an issue for a couple of weeks to see if any improvement in symptoms with eliminating them.    5. Speak with your primary care doctor to see if he would like to do some further testing or if he agrees to refer you to see a gastroenterologist due to having more incontinent stools.     Can follow up as needed.    If you would like to schedule a follow up appointment with Katty Herrera, Registered Dietitian, please call [phone].    Katty Herrera, MS, RD, LD

## 2021-12-04 LAB — SARS-COV-2 RNA RESP QL NAA+PROBE: NEGATIVE

## 2021-12-06 LAB
BACTERIA SPEC CULT: NORMAL
GRAM STAIN RESULT: NORMAL

## 2021-12-10 ENCOUNTER — LAB REQUISITION (OUTPATIENT)
Dept: LAB | Facility: CLINIC | Age: 51
End: 2021-12-10
Payer: MEDICARE

## 2021-12-10 DIAGNOSIS — Z03.818 ENCOUNTER FOR OBSERVATION FOR SUSPECTED EXPOSURE TO OTHER BIOLOGICAL AGENTS RULED OUT: ICD-10-CM

## 2021-12-10 PROCEDURE — U0005 INFEC AGEN DETEC AMPLI PROBE: HCPCS | Mod: ORL | Performed by: FAMILY MEDICINE

## 2021-12-12 LAB — SARS-COV-2 RNA RESP QL NAA+PROBE: NEGATIVE

## 2022-02-18 ENCOUNTER — LAB REQUISITION (OUTPATIENT)
Dept: LAB | Facility: CLINIC | Age: 52
End: 2022-02-18
Payer: MEDICARE

## 2022-02-18 DIAGNOSIS — R10.32 LEFT LOWER QUADRANT PAIN: ICD-10-CM

## 2022-02-18 LAB
ALBUMIN SERPL-MCNC: 4.2 G/DL (ref 3.5–5)
ALP SERPL-CCNC: 81 U/L (ref 45–120)
ALT SERPL W P-5'-P-CCNC: 22 U/L (ref 0–45)
ANION GAP SERPL CALCULATED.3IONS-SCNC: 8 MMOL/L (ref 5–18)
AST SERPL W P-5'-P-CCNC: 17 U/L (ref 0–40)
BILIRUB SERPL-MCNC: 0.4 MG/DL (ref 0–1)
BUN SERPL-MCNC: 16 MG/DL (ref 8–22)
CALCIUM SERPL-MCNC: 9.6 MG/DL (ref 8.5–10.5)
CHLORIDE BLD-SCNC: 107 MMOL/L (ref 98–107)
CO2 SERPL-SCNC: 27 MMOL/L (ref 22–31)
CREAT SERPL-MCNC: 1.11 MG/DL (ref 0.7–1.3)
GFR SERPL CREATININE-BSD FRML MDRD: 81 ML/MIN/1.73M2
GLUCOSE BLD-MCNC: 91 MG/DL (ref 70–125)
POTASSIUM BLD-SCNC: 4.1 MMOL/L (ref 3.5–5)
PROT SERPL-MCNC: 7.1 G/DL (ref 6–8)
SODIUM SERPL-SCNC: 142 MMOL/L (ref 136–145)
TSH SERPL DL<=0.005 MIU/L-ACNC: 5.82 UIU/ML (ref 0.3–5)

## 2022-02-18 PROCEDURE — 84439 ASSAY OF FREE THYROXINE: CPT | Mod: ORL | Performed by: FAMILY MEDICINE

## 2022-02-18 PROCEDURE — 82784 ASSAY IGA/IGD/IGG/IGM EACH: CPT | Mod: ORL | Performed by: FAMILY MEDICINE

## 2022-02-18 PROCEDURE — 80053 COMPREHEN METABOLIC PANEL: CPT | Mod: ORL | Performed by: FAMILY MEDICINE

## 2022-02-18 PROCEDURE — 84443 ASSAY THYROID STIM HORMONE: CPT | Mod: ORL | Performed by: FAMILY MEDICINE

## 2022-02-19 LAB — T4 FREE SERPL-MCNC: 0.8 NG/DL (ref 0.7–1.8)

## 2022-02-23 LAB
GLIADIN IGA SER-ACNC: 0.3 U/ML
GLIADIN IGG SER-ACNC: <0.6 U/ML
IGA SERPL-MCNC: 50 MG/DL (ref 84–499)
TTG IGA SER-ACNC: <0.2 U/ML
TTG IGG SER-ACNC: 0.7 U/ML

## 2022-03-02 ENCOUNTER — LAB REQUISITION (OUTPATIENT)
Dept: LAB | Facility: CLINIC | Age: 52
End: 2022-03-02
Payer: MEDICARE

## 2022-03-02 DIAGNOSIS — R19.7 DIARRHEA, UNSPECIFIED: ICD-10-CM

## 2022-03-02 LAB — C DIFF TOX B STL QL: NEGATIVE

## 2022-03-02 PROCEDURE — 87209 SMEAR COMPLEX STAIN: CPT | Mod: ORL | Performed by: FAMILY MEDICINE

## 2022-03-02 PROCEDURE — 87493 C DIFF AMPLIFIED PROBE: CPT | Mod: ORL,XU | Performed by: FAMILY MEDICINE

## 2022-03-02 PROCEDURE — 87506 IADNA-DNA/RNA PROBE TQ 6-11: CPT | Mod: ORL | Performed by: FAMILY MEDICINE

## 2022-03-03 LAB
C COLI+JEJUNI+LARI FUSA STL QL NAA+PROBE: NOT DETECTED
EC STX1 GENE STL QL NAA+PROBE: NOT DETECTED
EC STX2 GENE STL QL NAA+PROBE: NOT DETECTED
NOROV GI+II ORF1-ORF2 JNC STL QL NAA+PR: NOT DETECTED
O+P STL MICRO: NEGATIVE
RVA NSP5 STL QL NAA+PROBE: NOT DETECTED
SALMONELLA SP RPOD STL QL NAA+PROBE: NOT DETECTED
SHIGELLA SP+EIEC IPAH STL QL NAA+PROBE: NOT DETECTED
V CHOL+PARA RFBL+TRKH+TNAA STL QL NAA+PR: NOT DETECTED
Y ENTERO RECN STL QL NAA+PROBE: NOT DETECTED

## 2022-03-15 ENCOUNTER — LAB REQUISITION (OUTPATIENT)
Dept: LAB | Facility: CLINIC | Age: 52
End: 2022-03-15
Payer: MEDICARE

## 2022-03-15 DIAGNOSIS — R10.32 LEFT LOWER QUADRANT PAIN: ICD-10-CM

## 2022-03-15 LAB — TSH SERPL DL<=0.005 MIU/L-ACNC: 3.71 UIU/ML (ref 0.3–5)

## 2022-03-15 PROCEDURE — 84443 ASSAY THYROID STIM HORMONE: CPT | Mod: ORL | Performed by: FAMILY MEDICINE

## 2022-03-28 DIAGNOSIS — I48.0 PAROXYSMAL ATRIAL FIBRILLATION (H): Primary | ICD-10-CM

## 2022-04-05 ENCOUNTER — LAB REQUISITION (OUTPATIENT)
Dept: LAB | Facility: CLINIC | Age: 52
End: 2022-04-05
Payer: MEDICARE

## 2022-04-05 DIAGNOSIS — R51.9 HEADACHE, UNSPECIFIED: ICD-10-CM

## 2022-04-05 LAB — ERYTHROCYTE [SEDIMENTATION RATE] IN BLOOD BY WESTERGREN METHOD: 12 MM/HR (ref 0–15)

## 2022-04-05 PROCEDURE — 85652 RBC SED RATE AUTOMATED: CPT | Mod: ORL | Performed by: FAMILY MEDICINE

## 2022-08-29 ENCOUNTER — APPOINTMENT (OUTPATIENT)
Dept: CT IMAGING | Facility: HOSPITAL | Age: 52
End: 2022-08-29
Attending: EMERGENCY MEDICINE
Payer: MEDICARE

## 2022-08-29 ENCOUNTER — HOSPITAL ENCOUNTER (EMERGENCY)
Facility: HOSPITAL | Age: 52
Discharge: HOME | End: 2022-08-29
Attending: EMERGENCY MEDICINE | Admitting: EMERGENCY MEDICINE
Payer: MEDICARE

## 2022-08-29 ENCOUNTER — HOSPITAL ENCOUNTER (OUTPATIENT)
Facility: HOSPITAL | Age: 52
Setting detail: OBSERVATION
Discharge: HOME | End: 2022-08-31
Attending: EMERGENCY MEDICINE | Admitting: HOSPITALIST
Payer: MEDICARE

## 2022-08-29 VITALS
HEART RATE: 74 BPM | SYSTOLIC BLOOD PRESSURE: 140 MMHG | DIASTOLIC BLOOD PRESSURE: 85 MMHG | RESPIRATION RATE: 20 BRPM | OXYGEN SATURATION: 96 % | TEMPERATURE: 98.6 F

## 2022-08-29 DIAGNOSIS — Z86.79 HISTORY OF ATRIAL FIBRILLATION: ICD-10-CM

## 2022-08-29 DIAGNOSIS — R42 DIZZINESS: ICD-10-CM

## 2022-08-29 DIAGNOSIS — S09.90XA HEAD INJURY, INITIAL ENCOUNTER: ICD-10-CM

## 2022-08-29 DIAGNOSIS — S09.90XA INJURY OF HEAD, INITIAL ENCOUNTER: ICD-10-CM

## 2022-08-29 DIAGNOSIS — R26.2 DIFFICULTY WALKING: ICD-10-CM

## 2022-08-29 DIAGNOSIS — Z87.898 HISTORY OF DEVELOPMENTAL DELAY: ICD-10-CM

## 2022-08-29 DIAGNOSIS — Z79.01 ANTICOAGULATED BY ANTICOAGULATION TREATMENT: ICD-10-CM

## 2022-08-29 LAB
ANION GAP SERPL CALCULATED.3IONS-SCNC: 8 MMOL/L (ref 5–18)
BASOPHILS # BLD AUTO: 0 10E3/UL (ref 0–0.2)
BASOPHILS NFR BLD AUTO: 0 %
BUN SERPL-MCNC: 18 MG/DL (ref 8–22)
CALCIUM SERPL-MCNC: 9.2 MG/DL (ref 8.5–10.5)
CHLORIDE BLD-SCNC: 108 MMOL/L (ref 98–107)
CO2 SERPL-SCNC: 27 MMOL/L (ref 22–31)
CREAT SERPL-MCNC: 1.12 MG/DL (ref 0.7–1.3)
EOSINOPHIL # BLD AUTO: 0.1 10E3/UL (ref 0–0.7)
EOSINOPHIL NFR BLD AUTO: 1 %
ERYTHROCYTE [DISTWIDTH] IN BLOOD BY AUTOMATED COUNT: 12.9 % (ref 10–15)
GFR SERPL CREATININE-BSD FRML MDRD: 80 ML/MIN/1.73M2
GLUCOSE BLD-MCNC: 101 MG/DL (ref 70–125)
HCT VFR BLD AUTO: 41.5 % (ref 40–53)
HGB BLD-MCNC: 13.6 G/DL (ref 13.3–17.7)
IMM GRANULOCYTES # BLD: 0.1 10E3/UL
IMM GRANULOCYTES NFR BLD: 1 %
LYMPHOCYTES # BLD AUTO: 2.7 10E3/UL (ref 0.8–5.3)
LYMPHOCYTES NFR BLD AUTO: 31 %
MCH RBC QN AUTO: 32.3 PG (ref 26.5–33)
MCHC RBC AUTO-ENTMCNC: 32.8 G/DL (ref 31.5–36.5)
MCV RBC AUTO: 99 FL (ref 78–100)
MONOCYTES # BLD AUTO: 0.9 10E3/UL (ref 0–1.3)
MONOCYTES NFR BLD AUTO: 10 %
NEUTROPHILS # BLD AUTO: 5 10E3/UL (ref 1.6–8.3)
NEUTROPHILS NFR BLD AUTO: 57 %
NRBC # BLD AUTO: 0 10E3/UL
NRBC BLD AUTO-RTO: 0 /100
PLATELET # BLD AUTO: 136 10E3/UL (ref 150–450)
POTASSIUM BLD-SCNC: 4.2 MMOL/L (ref 3.5–5)
RBC # BLD AUTO: 4.21 10E6/UL (ref 4.4–5.9)
SODIUM SERPL-SCNC: 143 MMOL/L (ref 136–145)
WBC # BLD AUTO: 8.7 10E3/UL (ref 4–11)

## 2022-08-29 PROCEDURE — 85025 COMPLETE CBC W/AUTO DIFF WBC: CPT | Performed by: EMERGENCY MEDICINE

## 2022-08-29 PROCEDURE — 99285 EMERGENCY DEPT VISIT HI MDM: CPT | Mod: 25

## 2022-08-29 PROCEDURE — G1010 CDSM STANSON: HCPCS

## 2022-08-29 PROCEDURE — 80048 BASIC METABOLIC PNL TOTAL CA: CPT | Performed by: EMERGENCY MEDICINE

## 2022-08-29 PROCEDURE — 250N000013 HC RX MED GY IP 250 OP 250 PS 637: Performed by: EMERGENCY MEDICINE

## 2022-08-29 PROCEDURE — 250N000011 HC RX IP 250 OP 636: Performed by: EMERGENCY MEDICINE

## 2022-08-29 PROCEDURE — 96374 THER/PROPH/DIAG INJ IV PUSH: CPT | Mod: 59

## 2022-08-29 PROCEDURE — 36415 COLL VENOUS BLD VENIPUNCTURE: CPT | Performed by: EMERGENCY MEDICINE

## 2022-08-29 PROCEDURE — 80175 DRUG SCREEN QUAN LAMOTRIGINE: CPT | Performed by: EMERGENCY MEDICINE

## 2022-08-29 PROCEDURE — 99285 EMERGENCY DEPT VISIT HI MDM: CPT | Mod: 25,27

## 2022-08-29 RX ORDER — ONDANSETRON 2 MG/ML
4 INJECTION INTRAMUSCULAR; INTRAVENOUS EVERY 30 MIN PRN
Status: DISCONTINUED | OUTPATIENT
Start: 2022-08-29 | End: 2022-08-31 | Stop reason: HOSPADM

## 2022-08-29 RX ORDER — METOPROLOL TARTRATE 25 MG/1
25 TABLET, FILM COATED ORAL 2 TIMES DAILY
COMMUNITY

## 2022-08-29 RX ORDER — LIDOCAINE 40 MG/G
CREAM TOPICAL
Status: DISCONTINUED | OUTPATIENT
Start: 2022-08-29 | End: 2022-08-29

## 2022-08-29 RX ORDER — ACETAMINOPHEN 325 MG/1
650 TABLET ORAL ONCE
Status: COMPLETED | OUTPATIENT
Start: 2022-08-29 | End: 2022-08-29

## 2022-08-29 RX ADMIN — ACETAMINOPHEN 650 MG: 325 TABLET ORAL at 23:40

## 2022-08-29 RX ADMIN — FAMOTIDINE 20 MG: 10 INJECTION INTRAVENOUS at 23:40

## 2022-08-29 ASSESSMENT — ENCOUNTER SYMPTOMS
FATIGUE: 1
HEADACHES: 1
WOUND: 1
HEADACHES: 1

## 2022-08-29 ASSESSMENT — ACTIVITIES OF DAILY LIVING (ADL): ADLS_ACUITY_SCORE: 35

## 2022-08-29 NOTE — ED PROVIDER NOTES
EMERGENCY DEPARTMENT ENCOUNTER      NAME: Lavon Iraheta  AGE: 52 year old male  YOB: 1970  MRN: [id number]  EVALUATION DATE & TIME: No admission date for patient encounter.    PCP: Zack Cho    ED PROVIDER: Jamia Soto M.D.      CHIEF COMPLAINT     Chief Complaint   Patient presents with     Fall         FINAL IMPRESSION:     1. Head injury, initial encounter    2. Anticoagulated by anticoagulation treatment    3. History of atrial fibrillation    4. History of developmental delay          MEDICAL DECISION MAKING:       Pertinent Labs & Imaging studies reviewed. (See chart for details)    52 year old male presents to the Emergency Department for evaluation of fall    ED Course as of 08/29/22 1204   Mon Aug 29, 2022   0958 Mr. Iraheta is a 53 yo male with history of developeental delay, a fib on eliquis who presents with evidence of trauma to the head and abrasion to the right arm.  Unknown when this happened.  He went to his family to watch weekend he is always wears a hat this morning when he went to his work they noticed that there was an abrasion on the forehead.  He said he tripped on his carpet the caregiver said he has a rug.  He otherwise had been doing well no vomiting no diarrhea no fever.   1000 Examination patient is well-appearing cooperative and pleasant points to his head he has an abrasion looks like a rug burn no hematoma no hyphema no hemotympanum does not grimace with palpation of the cervical thoracic or lumbar spine has   1001 Surgical scar from previous thoracic surgery his chest wall is nontender abdomen he smiles when I touch it and he has full range of motion of bilateral upper and lower extremities.   1001 He has a superficial abrasion not bleeding on the forearm on the right with full range of motion of the elbow.   1001 Differential diagnoses include but not limited to head trauma given that he is anticoagulated.  We will do C-spine because of his age.  No  other signs of trauma.  Sinus tenderness similar to what will happen when somebody falls and scraped her head on a rug.  I do not suspect nonaccidental trauma.  He has been doing otherwise okay so doubt electrolyte abnormalities or acute coronary syndrome or dysrhythmia.  He is anticoagulated.   1148 Trauma evaluation included a CT head and CT spine both negative.    1148 I spoke with patient's mother and caregiver both feel comfortable with discharge instructions and return precautions.   1148 Clinical impression and decision making 52-year-old male mentally delay   1148 Signs of rug burn on the head and a small abrasion on the forearm cA. fib anticoagulated here with not witnessed.  Well-appearing declines anything for pain trauma evaluation included a CT head CT C-spine negative.  Has full range of motion of the upper and lower extremities no midline cervical thoracic or lumbar pain.  Patient has a history of seizure disorder but no recent history of seizures or postictal state.  He is compliant with medications he has been managed by a provider.  Will discharge with head injury instructions and return precautions.       Vital Signs: Hypertension  EKG: None  Imaging: CT head CT C-spine no  Home Meds: Reviewed  ED meds/abx: None  Fluids:none    Labs  none        Review of Previous Records  Patient was seen at [facility] 1/20/2019 after a fall. MDM as follows:  Lavon Iraheta is a 48 y.o. male who presents secondary to a head injury. The patient lost his balance on his way out of the shower this evening and hit his head while falling down. His caregiver states that he has pain in his neck and right buttock. He also reports having pain on the right side of his head, behind his right ear, and behind his right eye. On the way here the patient reported feeling a stinging sensation behind his right ear. He denies right arm pain but endorses right knee pain. Of note, the patient is on Warfarin therapy. His neuro  exam is intact with crisp fundi, normal ambulation, normal CN II-XII, no pronator drift and normal behavior. The patient's C-spine was clinically cleared with no midline tenderness, full ROM without radiculopathy with good strength in biceps, triceps, wrist extension, flexion and interosseous muscle strength and sensation intact in medial, radial and ulnar nerve distribution in the hand. The patient did have a negative head CT showing no fracture or bleed. We discussed the importance of second impact syndrome and avoiding any activity that could cause a second head injury until symptoms have resolved or cleared by primary care provider. Advised Tylenol and/or ibuprofen for pain control, and return for worsening headache, weakness in arms or legs or sensory changes in arms or legs, vomiting more than two separate times or behavioral changes such as excessive sleepiness or confusion. Patient and caregiver left with complete understanding and agreement with this plan and no other complaints.      Consults      ED COURSE     9:50 AM I met the patient and performed my initial interview and exam. Patient seen in triage.    10:45 AM I spoke with and updated the patient's mother who is now here with the patient.    11:48 AM I updated the patient and his mom on patient's results. Discussed plan for discharge.    At the conclusion of the encounter I discussed the results of all of the tests and the disposition. The questions were answered. The patient, his mother, and his caregiver acknowledged understanding and was agreeable with the care plan.           MEDICATIONS GIVEN IN THE EMERGENCY:   Medications - No data to display    NEW PRESCRIPTIONS STARTED AT TODAY'S ER VISIT     New Prescriptions    No medications on file          =================================================================    HPI     Patient information was obtained from: patient     Use of : N/A         Lavon Iraheta is a 52 year old male  who presents by private vehicle at 9:45 AM for evaluation of unwitnessed fall.    Per the patient's caregiver, the patient lives in a group home due to developmental disability and fused back. She notes he also has atrial fibrillation and is on Eliquis. She reports that the patient has overall been doing well, but staff noticed this morning that he has an abrasion on his right forehead. She also notes an abrasion to his right forearm. Staff are uncertain what happened, but suspect the patient fell. They do not know when. She says the patient told her that he tripped overnight and fell on his carpet.  Last night, the patient went to [employer] with his family and was in a good mood upon return to his home. The caregiver notes that the patient is not a reliable , as he often complains of physical pain when he is stressed. Caregiver states patient appears at his baseline mental status.    Per the patient, his head hurts. He reports no other complaints at this time.    PMHx: paroxysmal a-fib, is on Eliquis.    SHx: Patient lives in group home due to developmental delay.      REVIEW OF SYSTEMS   Review of Systems   Skin: Positive for wound (abrasions to right forearm and right forehead).   Neurological: Positive for headaches.   All other systems reviewed and are negative.    PAST MEDICAL HISTORY:     Past Medical History:   Diagnosis Date     GERD (gastroesophageal reflux disease)      Mental retardation      Seizure disorder (H)        PAST SURGICAL HISTORY:   History reviewed. No pertinent surgical history.      CURRENT MEDICATIONS:   ACETAMINOPHEN (TYLENOL ORAL)  calcium carbonate (TUMS E-X) 300 mg (750 mg) Chew  carBAMazepine (TEGRETOL  XR) 100 MG 12 hr tablet  carBAMazepine (TEGRETOL XR) 200 MG 12 hr tablet  CHOLECALCIFEROL, VITAMIN D3, (VITAMIN D3 ORAL)  clotrimazole (LOTRIMIN) 1 % cream  ELIQUIS ANTICOAGULANT 5 MG tablet  fluocinolone (SYNALAR) 0.025 % ointment  lamoTRIgine (LAMICTAL) 100 MG  tablet  lamoTRIgine (LAMICTAL) 25 MG tablet  MEDICATION CANNOT BE REORDERED - PLEASE MANUALLY REORDER AND DISCONTINUE THE OLD ORDER  METHYLCELLULOSE (CITRUCEL ORAL)  METOPROLOL TARTRATE ORAL  triamcinolone (KENALOG) 0.1 % ointment         ALLERGIES:     Allergies   Allergen Reactions     Biaxin [Clarithromycin] Unknown     Codeine Unknown     Diphenhydramine Hcl [Diphenhydramine] Unknown     Levofloxacin Unknown     Morphine Unknown       FAMILY HISTORY:   History reviewed. No pertinent family history.    SOCIAL HISTORY:     Social History     Socioeconomic History     Marital status: Single   Tobacco Use     Smoking status: Never Smoker     Smokeless tobacco: Never Used   Social History Narrative    He is a resident of a group home (12/22/2014)       VITALS:   BP (!) 140/85   Pulse 74   Temp 98.6  F (37  C) (Temporal)   Resp 20   SpO2 96%     PHYSICAL EXAM     Physical Exam  Vitals and nursing note reviewed. Exam conducted with a chaperone present.   Constitutional:       Appearance: Normal appearance.   Musculoskeletal:      Cervical back: Normal range of motion.   Skin:     General: Skin is warm.      Capillary Refill: Capillary refill takes less than 2 seconds.   Neurological:      Mental Status: He is alert. Mental status is at baseline.         Physical Exam   Constitutional: Well-appearing, cooperative, pleasant. No distress. Patient is smiling, here with caregiver.    Head: Atraumatic.     Nose: Nose normal.     Mouth/Throat: Oropharynx is clear and moist.     Eyes: EOM are normal. Pupils are equal, round, and reactive to light. No hyphema.     Ears: No hemotympanum.    Neck: Normal range of motion. Neck supple.     Cardiovascular: Normal rate, regular rhythm and normal heart sounds.      Pulmonary/Chest: Normal effort  and breath sounds normal.     Abdominal: Soft, non-tender.    Musculoskeletal: Normal range of motion.     Neurological: Awake, alert, smiling, appears at baseline per  caregiver.    Lymphatics: No edema.    : N/A    Skin: Skin is warm and dry. Surgical scar on back. Superficial abrasion to right forehead and right forearm.    Psychiatric: Normal mood and affect. Behavior is normal.       LAB:     All pertinent labs reviewed and interpreted.  Labs Ordered and Resulted from Time of ED Arrival to Time of ED Departure - No data to display     RADIOLOGY:     Reviewed all pertinent imaging. Please see official radiology report.  Cervical spine CT w/o contrast   Final Result   IMPRESSION:      HEAD CT:   1. Senescent changes and sequelae of chronic microangiopathy without acute intracranial abnormality.      CERVICAL SPINE CT:   1. No acute traumatic injury of the cervical spine.       Head CT w/o contrast   Final Result   IMPRESSION:      HEAD CT:   1. Senescent changes and sequelae of chronic microangiopathy without acute intracranial abnormality.      CERVICAL SPINE CT:   1. No acute traumatic injury of the cervical spine.            EKG:       I have independently reviewed and interpreted the EKG(s) documented above.      PROCEDURES:     Procedures      I, Phyllis Fisher, am serving as a scribe to document services personally performed by Dr. Soto based on my observation and the provider's statements to me. I, Jamia Soto MD attest that Phyllis Fisher is acting in a scribe capacity, has observed my performance of the services and has documented them in accordance with my direction.    Jamia Soto M.D.  Emergency Medicine  [facility] EMERGENCY DEPARTMENT  [facility] [facility] [address]  [phone]  Dept: [phone]     Jamia Soto MD  08/29/22 3908

## 2022-08-29 NOTE — ED TRIAGE NOTES
Patients group home director was called from his work today about an abrasion to right side of head and right lower arm. Patient possibly fell last night trying to go to the bathroom onto carpeted floor, patient is on blood thinners for atrial fibrillation.

## 2022-08-29 NOTE — DISCHARGE INSTRUCTIONS
Read and follow the discharge instructions.    Head CT did not show any bleeding.    Tylenol as needed for pain    Follow-up primary care doctor for reevaluation    Return immediately or call 911 if patient is vomiting not acting the same or any other concerns.

## 2022-08-29 NOTE — LETTER
[place] [address]  [address] [address]  Phone: [phone]  Fax: [phone]    August 31, 2022        Lavon Iraheta  [facility] [facility]  [place] [facility] [address]          To whom it may concern:    RE: Lavon Iraheta    Lavon should not go to the [facility] unless neurology is OK with it.  Please contact me for questions or concerns.      Sincerely,    Naina Oshea

## 2022-08-30 ENCOUNTER — APPOINTMENT (OUTPATIENT)
Dept: MRI IMAGING | Facility: HOSPITAL | Age: 52
End: 2022-08-30
Attending: INTERNAL MEDICINE
Payer: MEDICARE

## 2022-08-30 ENCOUNTER — APPOINTMENT (OUTPATIENT)
Dept: OCCUPATIONAL THERAPY | Facility: HOSPITAL | Age: 52
End: 2022-08-30
Attending: INTERNAL MEDICINE
Payer: MEDICARE

## 2022-08-30 PROBLEM — Z79.01 LONG TERM CURRENT USE OF ANTICOAGULANT THERAPY: Status: ACTIVE | Noted: 2022-08-30

## 2022-08-30 PROBLEM — E78.5 HYPERLIPIDEMIA LDL GOAL <100: Status: ACTIVE | Noted: 2022-08-30

## 2022-08-30 PROBLEM — I82.409 DVT (DEEP VENOUS THROMBOSIS) (H): Status: ACTIVE | Noted: 2022-08-30

## 2022-08-30 PROBLEM — R42 DIZZINESS: Status: ACTIVE | Noted: 2022-08-30

## 2022-08-30 PROBLEM — S09.90XA INJURY OF HEAD, INITIAL ENCOUNTER: Status: ACTIVE | Noted: 2022-08-30

## 2022-08-30 PROBLEM — G80.9 CEREBRAL PALSY (H): Status: ACTIVE | Noted: 2022-08-30

## 2022-08-30 PROBLEM — G40.909 EPILEPSY (H): Status: ACTIVE | Noted: 2022-08-30

## 2022-08-30 PROBLEM — R26.2 DIFFICULTY WALKING: Status: ACTIVE | Noted: 2022-08-30

## 2022-08-30 PROBLEM — I48.20 CHRONIC ATRIAL FIBRILLATION (H): Status: ACTIVE | Noted: 2022-08-30

## 2022-08-30 PROBLEM — D68.51 FACTOR V LEIDEN (H): Status: ACTIVE | Noted: 2022-08-30

## 2022-08-30 PROBLEM — G47.30 SLEEP APNEA: Status: ACTIVE | Noted: 2022-08-30

## 2022-08-30 LAB
ALBUMIN UR-MCNC: NEGATIVE MG/DL
APPEARANCE UR: CLEAR
ATRIAL RATE - MUSE: 357 BPM
BILIRUB UR QL STRIP: NEGATIVE
COLOR UR AUTO: NORMAL
DIASTOLIC BLOOD PRESSURE - MUSE: NORMAL MMHG
GLUCOSE UR STRIP-MCNC: NEGATIVE MG/DL
HGB UR QL STRIP: NEGATIVE
INTERPRETATION ECG - MUSE: NORMAL
KETONES UR STRIP-MCNC: NEGATIVE MG/DL
LEUKOCYTE ESTERASE UR QL STRIP: NEGATIVE
NITRATE UR QL: NEGATIVE
P AXIS - MUSE: NORMAL DEGREES
PH UR STRIP: 6.5 [PH] (ref 5–7)
PR INTERVAL - MUSE: NORMAL MS
QRS DURATION - MUSE: 90 MS
QT - MUSE: 366 MS
QTC - MUSE: 397 MS
R AXIS - MUSE: 41 DEGREES
SARS-COV-2 RNA RESP QL NAA+PROBE: NEGATIVE
SP GR UR STRIP: 1.03 (ref 1–1.03)
SYSTOLIC BLOOD PRESSURE - MUSE: NORMAL MMHG
T AXIS - MUSE: 38 DEGREES
UROBILINOGEN UR STRIP-MCNC: <2 MG/DL
VENTRICULAR RATE- MUSE: 71 BPM

## 2022-08-30 PROCEDURE — 97166 OT EVAL MOD COMPLEX 45 MIN: CPT | Mod: GO

## 2022-08-30 PROCEDURE — 99207 PR NO CHARGE LOS: CPT | Performed by: INTERNAL MEDICINE

## 2022-08-30 PROCEDURE — 99220 PR INITIAL OBSERVATION CARE,LEVEL III: CPT | Performed by: INTERNAL MEDICINE

## 2022-08-30 PROCEDURE — 96376 TX/PRO/DX INJ SAME DRUG ADON: CPT | Mod: 59

## 2022-08-30 PROCEDURE — U0003 INFECTIOUS AGENT DETECTION BY NUCLEIC ACID (DNA OR RNA); SEVERE ACUTE RESPIRATORY SYNDROME CORONAVIRUS 2 (SARS-COV-2) (CORONAVIRUS DISEASE [COVID-19]), AMPLIFIED PROBE TECHNIQUE, MAKING USE OF HIGH THROUGHPUT TECHNOLOGIES AS DESCRIBED BY CMS-2020-01-R: HCPCS | Performed by: EMERGENCY MEDICINE

## 2022-08-30 PROCEDURE — 81003 URINALYSIS AUTO W/O SCOPE: CPT | Performed by: INTERNAL MEDICINE

## 2022-08-30 PROCEDURE — C9803 HOPD COVID-19 SPEC COLLECT: HCPCS

## 2022-08-30 PROCEDURE — 97535 SELF CARE MNGMENT TRAINING: CPT | Mod: GO

## 2022-08-30 PROCEDURE — 70553 MRI BRAIN STEM W/O & W/DYE: CPT | Mod: MA

## 2022-08-30 PROCEDURE — 250N000013 HC RX MED GY IP 250 OP 250 PS 637: Performed by: HOSPITALIST

## 2022-08-30 PROCEDURE — G0378 HOSPITAL OBSERVATION PER HR: HCPCS

## 2022-08-30 PROCEDURE — 70549 MR ANGIOGRAPH NECK W/O&W/DYE: CPT | Mod: MA

## 2022-08-30 PROCEDURE — 250N000011 HC RX IP 250 OP 636: Performed by: RADIOLOGY

## 2022-08-30 PROCEDURE — 93005 ELECTROCARDIOGRAM TRACING: CPT | Performed by: INTERNAL MEDICINE

## 2022-08-30 PROCEDURE — 250N000013 HC RX MED GY IP 250 OP 250 PS 637: Performed by: INTERNAL MEDICINE

## 2022-08-30 PROCEDURE — A9585 GADOBUTROL INJECTION: HCPCS | Performed by: INTERNAL MEDICINE

## 2022-08-30 PROCEDURE — 70544 MR ANGIOGRAPHY HEAD W/O DYE: CPT | Mod: MA

## 2022-08-30 PROCEDURE — 96375 TX/PRO/DX INJ NEW DRUG ADDON: CPT

## 2022-08-30 PROCEDURE — 255N000002 HC RX 255 OP 636: Performed by: INTERNAL MEDICINE

## 2022-08-30 PROCEDURE — 99205 OFFICE O/P NEW HI 60 MIN: CPT | Performed by: PSYCHIATRY & NEUROLOGY

## 2022-08-30 RX ORDER — ACETAMINOPHEN 325 MG/1
650 TABLET ORAL EVERY 4 HOURS PRN
Status: DISCONTINUED | OUTPATIENT
Start: 2022-08-30 | End: 2022-08-31 | Stop reason: HOSPADM

## 2022-08-30 RX ORDER — FENTANYL CITRATE 50 UG/ML
25-50 INJECTION, SOLUTION INTRAMUSCULAR; INTRAVENOUS EVERY 5 MIN PRN
Status: DISCONTINUED | OUTPATIENT
Start: 2022-08-30 | End: 2022-08-31 | Stop reason: CLARIF

## 2022-08-30 RX ORDER — GADOBUTROL 604.72 MG/ML
10 INJECTION INTRAVENOUS ONCE
Status: COMPLETED | OUTPATIENT
Start: 2022-08-30 | End: 2022-08-30

## 2022-08-30 RX ORDER — NALOXONE HYDROCHLORIDE 0.4 MG/ML
0.2 INJECTION, SOLUTION INTRAMUSCULAR; INTRAVENOUS; SUBCUTANEOUS
Status: DISCONTINUED | OUTPATIENT
Start: 2022-08-30 | End: 2022-08-31 | Stop reason: HOSPADM

## 2022-08-30 RX ORDER — NALOXONE HYDROCHLORIDE 0.4 MG/ML
0.4 INJECTION, SOLUTION INTRAMUSCULAR; INTRAVENOUS; SUBCUTANEOUS
Status: DISCONTINUED | OUTPATIENT
Start: 2022-08-30 | End: 2022-08-31 | Stop reason: HOSPADM

## 2022-08-30 RX ORDER — METOPROLOL TARTRATE 25 MG/1
25 TABLET, FILM COATED ORAL 2 TIMES DAILY
Status: DISCONTINUED | OUTPATIENT
Start: 2022-08-30 | End: 2022-08-31 | Stop reason: HOSPADM

## 2022-08-30 RX ORDER — DIAZEPAM 5 MG
5 TABLET ORAL EVERY 30 MIN PRN
Status: DISCONTINUED | OUTPATIENT
Start: 2022-08-30 | End: 2022-08-31 | Stop reason: ALTCHOICE

## 2022-08-30 RX ORDER — FLUMAZENIL 0.1 MG/ML
0.2 INJECTION, SOLUTION INTRAVENOUS
Status: DISCONTINUED | OUTPATIENT
Start: 2022-08-30 | End: 2022-08-31 | Stop reason: ALTCHOICE

## 2022-08-30 RX ADMIN — ACETAMINOPHEN 650 MG: 325 TABLET ORAL at 19:51

## 2022-08-30 RX ADMIN — METOPROLOL TARTRATE 25 MG: 25 TABLET, FILM COATED ORAL at 20:53

## 2022-08-30 RX ADMIN — MIDAZOLAM 1 MG: 1 INJECTION INTRAMUSCULAR; INTRAVENOUS at 16:09

## 2022-08-30 RX ADMIN — GADOBUTROL 10 ML: 604.72 INJECTION INTRAVENOUS at 16:57

## 2022-08-30 RX ADMIN — FENTANYL CITRATE 50 MCG: 50 INJECTION, SOLUTION INTRAMUSCULAR; INTRAVENOUS at 15:50

## 2022-08-30 RX ADMIN — METOPROLOL TARTRATE 25 MG: 25 TABLET, FILM COATED ORAL at 10:30

## 2022-08-30 RX ADMIN — MIDAZOLAM 1 MG: 1 INJECTION INTRAMUSCULAR; INTRAVENOUS at 15:52

## 2022-08-30 RX ADMIN — FENTANYL CITRATE 50 MCG: 50 INJECTION, SOLUTION INTRAMUSCULAR; INTRAVENOUS at 16:07

## 2022-08-30 ASSESSMENT — ACTIVITIES OF DAILY LIVING (ADL)
ADLS_ACUITY_SCORE: 35
DEPENDENT_IADLS:: CLEANING;COOKING;LAUNDRY;SHOPPING;MEAL PREPARATION;MEDICATION MANAGEMENT;TRANSPORTATION
ADLS_ACUITY_SCORE: 35

## 2022-08-30 NOTE — PROGRESS NOTES
Pt to transfer to P1 Room 122 when returns from MRI. Report called and given to JAQUAN Stoddard.     Pt returned from MRI. Swat nurse to bring pt to room 122. Transported at 1735.

## 2022-08-30 NOTE — PROGRESS NOTES
[facility]      OUTPATIENT OCCUPATIONAL THERAPY  EVALUATION  PLAN OF TREATMENT FOR OUTPATIENT REHABILITATION  (COMPLETE FOR INITIAL CLAIMS ONLY)  Patient's Last Name, First Name, M.I.  YOB: 1970  MyrtleLavon SCHMIDT                          Provider's Name  [facility] Medical Record No.  [id number]                               Onset Date:  08/29/22   Start of Care Date:  08/30/22     Type:     ___PT   _X_OT   ___SLP Medical Diagnosis:  fall                        OT Diagnosis:  Impaired ability to perform ADLs and functional mobility.   Visits from SOC:  1   _________________________________________________________________________________  Plan of Treatment/Functional Goals    Planned Interventions: ADL retraining, IADL retraining, balance training, bed mobility training, cognition, transfer training, home program guidelines, progressive activity/exercise   Goals: See Occupational Therapy Goals on Care Plan in [facility] electronic health record.    Therapy Frequency: Daily  Predicted Duration of Therapy Intervention: 09/06/22  _________________________________________________________________________________    I CERTIFY THE NEED FOR THESE SERVICES FURNISHED UNDER        THIS PLAN OF TREATMENT AND WHILE UNDER MY CARE     (Physician co-signature of this document indicates review and certification of the therapy plan).              Certification date from: 08/30/22, Certification date to: 09/06/22    Referring Physician: Naina Oshea MD            Initial Assessment        See Occupational Therapy evaluation dated 08/30/22 in Epic electronic health record.

## 2022-08-30 NOTE — ED NOTES
"[facility] ED Handoff Report    ED Chief Complaint: Pt presents with headache from fall w/head injury today. Pt was seen in ED this am after being sent by EMS from his day program. Pt then went to work and Mother was called due to his large lump on his head and being dizzy. Mother spoke to Dr. Lopez and Dr. Crenshaw at [facility] an dt hey advised to come in for a repeat CT/MRI and requested to have a Lamotrigine level, CMP and CBC w/diff and platelets done.      Triage Assessment     Row Name 08/29/22 1913       Triage Assessment (Adult)    Airway WDL WDL       Respiratory WDL    Respiratory WDL WDL       Skin Circulation/Temperature WDL    Skin Circulation/Temperature WDL WDL       Cardiac WDL    Cardiac WDL WDL       Peripheral/Neurovascular WDL    Peripheral Neurovascular WDL WDL       Cognitive/Neuro/Behavioral WDL    Cognitive/Neuro/Behavioral WDL WDL                  ED Diagnosis:  (R26.2) Difficulty walking  Comment: ambulated to bathroom this am, noted to be slightly \"wobbly\", reported this is baseline.  Plan: obsv    (R42) Dizziness  Comment: none noted      (S09.90XA) Injury of head, initial encounter  Comment: abrasions noted  Ct completed, neuro to consult       PMH:    Past Medical History:   Diagnosis Date     GERD (gastroesophageal reflux disease)      Mental retardation      Seizure disorder (H)         Code Status:  No Order     Falls Risk: Yes Band: Applied    Current Living Situation/Residence: with mother     Elimination Status: Continent: Yes     Activity Level: SBA w/ walker    Patients Preferred Language:  English     Needed: No    Vital Signs:  /73   Pulse 70   Temp 98.4  F (36.9  C) (Oral)   Resp 16   Ht 1.816 m (5' 11.5\")   Wt 102.1 kg (225 lb)   SpO2 96%   BMI 30.94 kg/m           Pain Score: 0/10    Is the Patient Confused:  No    Last Food or Drink: 8/29/22     Focused Assessment:  Gait and neuro    Tests Performed: Done: Labs and Imaging    Treatments " Provided:  See mar    Family Dynamics/Concerns: Yes; please explain: mother at bedside    Family Updated On Visitor Policy: Yes    Plan of Care Communicated to Family: Yes        Belongings Checklist Done and Signed by Patient: No    Medications sent with patient: no    Covid: asymptomatic , negative    Additional Information:     RN: Servando Read RN 8/30/2022 8:11 AM

## 2022-08-30 NOTE — ED NOTES
Pt is here with mother. Forehead bruising noted. Pt is alert and awake. Cooperative. Endorses pain to LLQ abd, moderate pain: will inform Dr. Archuleta. VSS.

## 2022-08-30 NOTE — ED TRIAGE NOTES
Pt presents with headache from fall w/head injury today. Pt was seen in ED this am after being sent by EMS from his day program. Pt then went to work and Mother was called due to his large lump on his head and being dizzy. Mother spoke to Dr. Lopez and Dr. Crenshaw at [facility] an dt hey advised to come in for a repeat CT/MRI and requested to have a Lamotrigine level, CMP and CBC w/diff and platelets done.      Triage Assessment     Row Name 08/29/22 5843       Triage Assessment (Adult)    Airway WDL WDL       Respiratory WDL    Respiratory WDL WDL       Skin Circulation/Temperature WDL    Skin Circulation/Temperature WDL WDL       Cardiac WDL    Cardiac WDL WDL       Peripheral/Neurovascular WDL    Peripheral Neurovascular WDL WDL       Cognitive/Neuro/Behavioral WDL    Cognitive/Neuro/Behavioral WDL WDL

## 2022-08-30 NOTE — H&P
Admission History and Physical   Lavon Iraheta, 1970, [id number]  [facility]  Injury of head, initial encounter [S09.90XA]  PCP:Zack Cho, [phone]   Admitting provider: Naina Oshea MD.    Code status:  Full Code          Extended Emergency Contact Information  Primary Emergency Contact: Aidee Iraheta  Address: [address] [place] [address]           [place] [address] [facility]  Home Phone: [phone]  Mobile Phone: [phone]  Relation: Mother  Secondary Emergency Contact: Dylan Iraheta  Address: [address] [place] [address]           [place] [address] [facility]  Mobile Phone: [phone]  Relation: Father       Assessment and Plan  Active Problems:    Dizziness    Difficulty walking    Injury of head, initial encounter    Chronic atrial fibrillation (H)    Long term current use of anticoagulant therapy    Factor V Leiden (H)    Cerebral palsy (H)    Hyperlipidemia LDL goal <100    DVT (deep venous thrombosis) (H)    Epilepsy (H)    Sleep apnea    Lavon Iraheta is a 52 year old male presenting with fall and dizziness    H/O epilepsy and followed at [facility] with dizziness and falls  - appreciate neurology consult  - MRI/MRA head and neck needs sedation for this   - PT/OT  - holding eliquis  - lamotrigine level pending   - CT x2 negative   - no evidence of infection normal CBC, checking UA  - per neurology would hold on traveling for now    Afib per mother had ablation and was in sinus rhythm   - eliquis on holding  - continue metoprolol  - cardiology consult     Factor V Leiden with h/o DVT  - holding eliquis and will need to resume pending specialist recs     Head injury   - CT x2 no acute findings  - DOAC on hold as above     COVID-19 PCR Results    COVID-19 PCR Results 2/9/21 12/3/21 12/10/21 8/30/22   SARS-CoV-2 Virus Specimen Source Nasopharyngeal      SARS-CoV-2 PCR Result NEGATIVE      SARS CoV2 PCR  Negative Negative Negative      Comments are  available for some flowsheets but are not being displayed.         COVID-19 Antibody Results, Testing for Immunity    COVID-19 Antibody Results, Testing for Immunity   No data to display.           VTE prophylaxis:  Pneumatic Compression Devices  DIET: Orders Placed This Encounter      NPO per Anesthesia Guidelines for Procedure/Surgery Except for: Meds    Drains/Lines: none  Weight bearing status: WBAT  Disposition/Barriers to discharge: pending MRI/MRA and specialist further recs   Code Status:Full Code    HPI: Lavon Iraheta is a 52 year old old male with h/o recent fall and dizziness.  He had head and cervical CT completed yesterday morning after his fall which was negative/normal. He continues to have difficulties with walking, and continued dizziness and visual complaints so she brought him into the emergency room for further evaluation.  Mother did check in with his neurologist at [facility].  They recommended possibly getting an MRI for which Lavon require sedation.  He has additional history of seizures and is on Lamictal.  Patient is still having dizziness.     Past Medical History:   Diagnosis Date     Cerebral palsy (H) 8/30/2022     Chronic atrial fibrillation (H) 8/30/2022     DVT (deep venous thrombosis) (H) 8/30/2022     Epilepsy (H) 8/30/2022     Factor V Leiden (H) 8/30/2022     GERD (gastroesophageal reflux disease)      Hyperlipidemia LDL goal <100 8/30/2022     Long term current use of anticoagulant therapy 8/30/2022     Mental retardation      Seizure disorder (H)      Sleep apnea 8/30/2022     Past Surgical History:   Procedure Laterality Date     ABLATION OF DYSRHYTHMIC FOCUS       AV NODE ABLATION       CATARACT EXTRACTION EXTRACAPSULAR W/ INTRAOCULAR LENS IMPLANTATION Bilateral      GALLBLADDER SURGERY       SPINAL FUSION       History reviewed. No pertinent family history.  Social History     Socioeconomic History     Marital status: Single     Spouse name: Not on file     Number of  children: Not on file     Years of education: Not on file     Highest education level: Not on file   Occupational History     Not on file   Tobacco Use     Smoking status: Never Smoker     Smokeless tobacco: Never Used   Substance and Sexual Activity     Alcohol use: Not on file     Drug use: Not on file     Sexual activity: Not on file   Other Topics Concern     Not on file   Social History Narrative    He is a resident of a group home (12/22/2014)     Social Determinants of Health     Financial Resource Strain: Not on file   Food Insecurity: Not on file   Transportation Needs: Not on file   Physical Activity: Not on file   Stress: Not on file   Social Connections: Not on file   Intimate Partner Violence: Not on file   Housing Stability: Not on file     Allergies   Allergen Reactions     Biaxin [Clarithromycin] Unknown     Codeine Unknown     Diphenhydramine Hcl [Diphenhydramine] Unknown     Levofloxacin Unknown     Morphine Unknown       PRIOR TO ADMISSION MEDICATIONS   (Not in a hospital admission)       REVIEW OF SYSTEMS:  12 point reviewed pertinent negatives and positives in HPI all others negative     PHYSICAL EXAM  Temp:  [98  F (36.7  C)-98.6  F (37  C)] 98  F (36.7  C)  Pulse:  [] 70  Resp:  [16-18] 18  BP: (101-155)/(58-95) 111/68  SpO2:  [95 %-98 %] 95 %  Wt Readings from Last 1 Encounters:   08/29/22 102.1 kg (225 lb)     Body mass index is 30.94 kg/m .  Physical Exam  Constitutional:       Comments: Pleasant laying in bed, NAD, nontoxic   HENT:      Head:      Comments: Right frontal temporal abrasion and bruising.      Mouth/Throat:      Mouth: Mucous membranes are dry.      Pharynx: Oropharynx is clear.   Eyes:      Extraocular Movements: Extraocular movements intact.      Conjunctiva/sclera: Conjunctivae normal.   Cardiovascular:      Rate and Rhythm: Normal rate. Rhythm irregular.      Pulses: Normal pulses.   Pulmonary:      Effort: Pulmonary effort is normal.      Breath sounds: Normal  breath sounds.   Abdominal:      General: Bowel sounds are normal. There is no distension.      Palpations: Abdomen is soft.      Tenderness: There is no abdominal tenderness. There is no guarding.   Musculoskeletal:         General: Normal range of motion.      Right lower leg: No edema.   Skin:     General: Skin is warm and dry.      Capillary Refill: Capillary refill takes less than 2 seconds.   Neurological:      Mental Status: He is alert. Mental status is at baseline.         PERTINENT LABS and RADIOLOGY   Results for orders placed or performed during the hospital encounter of 08/29/22   Head CT w/o contrast    Impression    IMPRESSION:  1.  No acute intracranial process.       Recent Labs   Lab 08/29/22  2141   WBC 8.7   HGB 13.6   MCV 99   *      POTASSIUM 4.2   CHLORIDE 108*   CO2 27   BUN 18   CR 1.12   ANIONGAP 8   VICENTA 9.2        Recent Results (from the past 24 hour(s))   Head CT w/o contrast    Narrative    EXAM: CT HEAD W/O CONTRAST  LOCATION: [facility]  DATE/TIME: 8/29/2022 9:27 PM    INDICATION: head injury  COMPARISON: CT head performed earlier on the same day. MRI dated 01/25/2008.  TECHNIQUE: Routine CT Head without IV contrast. Multiplanar reformats. Dose reduction techniques were used.    FINDINGS:  Motion and streak artifact limit the evaluation of the skull base and posterior fossa.    INTRACRANIAL CONTENTS: No intracranial hemorrhage, extraaxial collection, or mass effect.  No CT evidence of acute infarct. Mild presumed chronic small vessel ischemic changes. Normal ventricles and sulci for patients age.     VISUALIZED ORBITS/SINUSES/MASTOIDS: No intraorbital abnormality. Mild mucosal thickening scattered about the paranasal sinuses without air-fluid levels. No mastoid effusion.    BONES/SOFT TISSUES: No acute abnormality.      Impression    IMPRESSION:  1.  No acute intracranial process.           Naina Oshea MD  [facility]  [facility]  [phone]

## 2022-08-30 NOTE — CONSULTS
Impression and Plan     Assessment:  1. Paroxysmal atrial fibrillation - currently in afib with controlled rate. As he is now asymptomatic and in afib with a controlled rate, it seems unlikely this caused his fall.  2. Dizziness with visual disturbance - suspect central cause of this symptom. Less likely cardiac, though will arrange for outpatient event monitor to evaluate for evidence of uncontrolled or paroxysmal arrhythmias.  3. Factor V leiden deficiency with history of DVT in September 2012 - not a watchman JOVANNA closure candidate    Plan:    Okay to resume eliquis    Okay for discharge from cardiac standpoint. Will arrange for 10 day mobile cardiac telemetry monitor.     Follow up with Dr Cary as already scheduled.    Discussed with Dr. Oshea    Primary Cardiologist: Dr. Adamson (now retired). Will establish with Dr. Antunez as he is Lavon's mother's cardiologist.    History of Present Illness      Mr. Lavon Iraheta is a 52 year old male with a history of mental retardation, paroxysmal atrial fibrillation, seizure disorder, and migraines who was admitted to the hospital via the emergency department after a fall due to dizziness.    Patient was seen in the emergency department earlier this morning due to complaints of dizziness with visual disturbances.  This resulted in a fall and he was evaluated with a CT of the head which was negative.  He was given Tylenol for pain and discharged home.  He had persistent dizziness and developed a contusion on his head.  His mother who is his primary caregiver reached out to his outpatient neurologist who recommended an MRI of his head. This would require sedation and due to ongoing symptoms he presented back to the emergency department.  He continued to feel dizzy up until earlier this afternoon.  Is not clear whether he lost consciousness when he fell early this morning but he could point out the exact location where he did fall.  It is noted that he  is on chronic oral anticoagulation due to paroxysmal atrial fibrillation. He currently is feeling well. Still has some intermittent dizziness but is inconsistent with his description (whether light headedness/presyncope vs vertigo). Symptoms tend to occur with positional changes.    Other than noted above, Mr. Iraheta denies any chest pain/pressure/tightness, shortness of breath at rest or with exertion, light headedness/dizziness, pre-syncope, syncope, lower extremity swelling, palpitations, paroxysmal nocturnal dyspnea (PND), or orthopnea.      Review of Systems:  Further review of systems is otherwise negative/noncontributory (based on review of medical record (admission H&P) and 13 point review of systems reviewed. Pertinent positives noted).    Cardiac Diagnostics     ECG: Personally reviewed and interpreted: atrial fibrillation with controlled HR (70 bpm)    Telemetry (personally reviewed): atrial fibrillation with controlled rate.    Most recent:  Echocardiogram (results reviewed):   TTE 10/9/14   Summary   Irregular rhythm   Left ventricular ejection fraction is visually estimated to be 55-60%.   Mild concentric left ventricular hypertrophy.   No significant valve abnormality      Medical History  Surgical History Family History Social History   Past Medical History:   Diagnosis Date     Cerebral palsy (H) 8/30/2022     Chronic atrial fibrillation (H) 8/30/2022     DVT (deep venous thrombosis) (H) 8/30/2022     Epilepsy (H) 8/30/2022     Factor V Leiden (H) 8/30/2022     GERD (gastroesophageal reflux disease)      Hyperlipidemia LDL goal <100 8/30/2022     Long term current use of anticoagulant therapy 8/30/2022     Mental retardation      Seizure disorder (H)      Sleep apnea 8/30/2022     Past Surgical History:   Procedure Laterality Date     ABLATION OF DYSRHYTHMIC FOCUS       AV NODE ABLATION       CATARACT EXTRACTION EXTRACAPSULAR W/ INTRAOCULAR LENS IMPLANTATION Bilateral      GALLBLADDER SURGERY        "SPINAL FUSION       History reviewed. No pertinent family history.        Social History     Socioeconomic History     Marital status: Single     Spouse name: Not on file     Number of children: Not on file     Years of education: Not on file     Highest education level: Not on file   Occupational History     Not on file   Tobacco Use     Smoking status: Never Smoker     Smokeless tobacco: Never Used   Substance and Sexual Activity     Alcohol use: Not on file     Drug use: Not on file     Sexual activity: Not on file   Other Topics Concern     Not on file   Social History Narrative    He is a resident of a group home (12/22/2014)     Social Determinants of Health     Financial Resource Strain: Not on file   Food Insecurity: Not on file   Transportation Needs: Not on file   Physical Activity: Not on file   Stress: Not on file   Social Connections: Not on file   Intimate Partner Violence: Not on file   Housing Stability: Not on file             Physical Examination   VITALS: /68 (BP Location: Left arm)   Pulse 88   Temp 98.7  F (37.1  C) (Oral)   Resp 18   Ht 1.816 m (5' 11.5\")   Wt 102.1 kg (225 lb)   SpO2 94%   BMI 30.94 kg/m    BMI: Body mass index is 30.94 kg/m .  Wt Readings from Last 3 Encounters:   08/29/22 102.1 kg (225 lb)   08/30/21 102.1 kg (225 lb)   12/07/20 106.1 kg (234 lb)     No intake or output data in the 24 hours ending 08/30/22 1510    General Appearance:  Alert, cooperative, no distress, appears stated age   Head:  Normocephalic, without obvious abnormality, atraumatic   Eyes:  PERRL, conjunctiva/corneas clear, EOM's intact   Ears:  Normal external ear canals bilaterally   Nose: Nares normal, septum midline, no drainage   Throat: Lips, mucosa, and tongue normal; teeth and gums normal   Neck: Supple, no JVD   Back:   Symmetric, no abnormal curvature, ROM normal   Lungs:   Clear to auscultation bilaterally, respirations unlabored   Chest Wall:  No tenderness or deformity   Heart:  " Irregularly irregular rhythm, normal rate, S1, S2 normal,no murmur, rub or gallop   Abdomen:   Soft, non-tender   Extremities: Extremities normal, atraumatic, no cyanosis or edema   Skin: Skin color, texture, turgor normal, no rashes or lesions   Psychiatric: Normal affect, pleasant and cooperative    Neurologic: Alert and oriented X 3, Moves all 4 extremities            Imaging      MRI/MRA head/neck  HEAD MRI:   1.  No acute intracranial finding.  2.  Mild presumed cerebral white matter chronic microvascular ischemic change.     HEAD MRA:   1.  Normal MRA [facility].     NECK MRA:  1.  Normal neck MRA.       Lab Results    Chemistry/lipid CBC Cardiac Enzymes/BNP/TSH/INR   Recent Labs   Lab Test 10/25/21  1618   CHOL 194   HDL 33*      TRIG 470*     Recent Labs   Lab Test 10/25/21  1618 11/12/19  1000 10/16/18  1644    118 120     Recent Labs   Lab Test 08/29/22  2141      POTASSIUM 4.2   CHLORIDE 108*   CO2 27      BUN 18   CR 1.12   GFRESTIMATED 80   VICENTA 9.2     Recent Labs   Lab Test 08/29/22  2141 02/18/22  1517 10/25/21  1618   CR 1.12 1.11 1.05     No results for input(s): A1C in the last 85441 hours.       Recent Labs   Lab Test 08/29/22 2141   WBC 8.7   HGB 13.6   HCT 41.5   MCV 99   *     Recent Labs   Lab Test 08/29/22  2141 05/18/20  1605 05/23/18  1643   HGB 13.6 13.9* 13.4*    No results for input(s): TROPONINI in the last 86666 hours.  No results for input(s): BNP, NTBNPI, NTBNP in the last 93134 hours.  Recent Labs   Lab Test 03/15/22  1631   TSH 3.71     No results for input(s): INR in the last 08134 hours.        Current Inpatient Scheduled Medications   Scheduled Meds:    metoprolol tartrate  25 mg Oral BID     Continuous Infusions:    No current outpatient medications on file.          Medications Prior to Admission   Prior to Admission medications    Medication Sig Start Date End Date Taking? Authorizing Provider   ACETAMINOPHEN (TYLENOL ORAL) Take 1,000  mg by mouth every 6 hours as needed 12/22/14  Yes Provider, Historical   calcium carbonate (TUMS E-X) 300 mg (750 mg) Chew Take 2 tablets by mouth 3 times daily as needed 2/12/15  Yes Provider, Historical   CHOLECALCIFEROL, VITAMIN D3, (VITAMIN D3 ORAL) [CHOLECALCIFEROL, VITAMIN D3, (VITAMIN D3 ORAL)] Take 2,000 Units by mouth daily.  12/22/14  Yes Provider, Historical   ELIQUIS ANTICOAGULANT 5 MG tablet TAKE 1 TABLET BY MOUTH TWICE DAILY. 5/4/22  Yes Jeevan Adamson MD   lamoTRIgine (LAMICTAL) 100 MG tablet Take 100 mg by mouth 2 times daily Along with a 25 mg tablet for a dose = 125 mg 9/17/15  Yes Provider, Historical   lamoTRIgine (LAMICTAL) 25 MG tablet Take 25 mg by mouth 2 times daily Along with a 100 mg tablet for a dose = 125 mg 2/27/15  Yes Provider, Historical   METHYLCELLULOSE (CITRUCEL ORAL) Take 2 g by mouth daily as needed 2/12/15  Yes Provider, Historical   metoprolol tartrate (LOPRESSOR) 25 MG tablet Take 25 mg by mouth 2 times daily   Yes Unknown, Entered By History

## 2022-08-30 NOTE — CONSULTS
NEUROLOGY CONSULTATION NOTE     Lavon Iraheta,  1970, MRN [id number] Date: 2022     [facility]   Code status:  No Order   PCP: Zack Cho, [phone]      ASSESSMENT & PLAN   Diagnosis code: Ataxia, dizziness, recent head injury.    52 year old male here for history of recent fall and dizziness.  He had head and cervical CT completed yesterday morning after his fall which was negative/normal.  Mother however noticed that he was having some difficulties with walking, and continued dizziness and visual complaints so she brought him into the emergency room for further evaluation.  Mother did check in with his neurologist at [facility].  They recommended possibly getting an MRI for which Lavon require sedation.  He has additional history of seizures and is on Lamictal.       Lamotrigine level pending.    Irregular heartbeat and fluctuating heart rates on exam.  EKG pending.  Known history of A. fib on Eliquis.    Head CTx2/cervical CT unremarkable.    Recommend MRI brain/MRA head and neck.    I do not think that Lavon should be leaving town until he is back to his baseline.  I do have some concerns with him going up north if there is potential for him to have recurrent dizziness.    Neurology will follow along        Chief Complaint   Patient presents with     Fall     Head Injury     Dizziness        HISTORY OF PRESENT ILLNESS     We have been requested by Dr. Stapleton to evaluate Lavon Iraheta who is a 52 year old male for history of recent fall and dizziness.  He had head and cervical CT completed yesterday morning after his fall which was negative/normal.  Mother however noticed that he was having some difficulties with walking, and continued dizziness and visual complaints so she brought him into the emergency room for further evaluation.  Mother did check in with his neurologist at [facility].  They recommended possibly getting an MRI for which Lavon require sedation.  He has  additional history of seizures and is on Lamictal.    Lavon says he still feels dizzy.  This is unusual per mother at bedside.  He has been up walking around with assistance.  He has additional history of A. fib for which he takes Eliquis.  Lavon told his mother the spot where he had his fall at his group home.  Not clear if he lost consciousness.  It is also not clear when his most recent seizure activity occurred.    Mother tells me privately that she and her  are , recently.  Lavon's father wants to take him up north to his cabin for a few days and she is very concerned about this.  He has not provided much in the way of care for Lavon in the past.     PAST MEDICAL & SURGICAL HISTORY     Medical History  Past Medical History:   Diagnosis Date     GERD (gastroesophageal reflux disease)      Mental retardation      Seizure disorder (H)      Surgical History  History reviewed. No pertinent surgical history.     SOCIAL HISTORY     Social History      FAMILY HISTORY     Reviewed, and family history is not on file.     ALLERGIES     Allergies   Allergen Reactions     Biaxin [Clarithromycin] Unknown     Codeine Unknown     Diphenhydramine Hcl [Diphenhydramine] Unknown     Levofloxacin Unknown     Morphine Unknown        REVIEW OF SYSTEMS     Review of systems not obtained due to patient factors.     HOME & HOSPITAL MEDICATIONS     Prior to Admission Medications  (Not in a hospital admission)      Hospital Medications    metoprolol tartrate  25 mg Oral BID        PHYSICAL EXAM     Vital signs  Temp:  [98.4  F (36.9  C)-98.6  F (37  C)] 98.4  F (36.9  C)  Pulse:  [] 100  Resp:  [16] 16  BP: (101-155)/(58-95) 129/84  SpO2:  [96 %-98 %] 96 %    Weight:   [unfilled]    General Physical Exam: Patient is alert and oriented x 1. Vital signs were reviewed and are documented in EMR. Neck was supple.  No carotid bruit, thyromegaly, JVD or lymphadenopathy noted.  CV: Irregularly irregular.  "  Neurological Exam:  Mental status: Alert and attentive.   Speech: Slight dysarthria.  Speech is childlike.  He pronounces dizzy: \"ditty.\"  Cranial nerves: 2-12 appear to be intact, except I am unable to visualize his palate.  Motor: Strength is full throughout.  Sensory: Appears to be intact to light touch though patient not fully reliable.  Coordination: Finger-nose-finger is good.  Gait: Deferred.  He is able to stand but complains of dizziness and heart rate increases by 20-30 bpms with standing.     DIAGNOSTIC STUDIES     Pertinent Radiology   Radiology Results: Personally reviewed image/s    CT Head/Cervical Spine:  FINDINGS:  Motion and streak artifact limit the evaluation of the skull base and posterior fossa.     INTRACRANIAL CONTENTS: No intracranial hemorrhage, extraaxial collection, or mass effect.  No CT evidence of acute infarct. Mild presumed chronic small vessel ischemic changes. Normal ventricles and sulci for patients age.      VISUALIZED ORBITS/SINUSES/MASTOIDS: No intraorbital abnormality. Mild mucosal thickening scattered about the paranasal sinuses without air-fluid levels. No mastoid effusion.     BONES/SOFT TISSUES: No acute abnormality.                                                                      IMPRESSION:  1.  No acute intracranial process.    IMPRESSION:     HEAD CT:  1. Senescent changes and sequelae of chronic microangiopathy without acute intracranial abnormality.     CERVICAL SPINE CT:  1. No acute traumatic injury of the cervical spine.     MRI/MRA:  pending    Pertinent Labs   Lab Results: personally reviewed.   Recent Results (from the past 24 hour(s))   Basic metabolic panel    Collection Time: 08/29/22  9:41 PM   Result Value Ref Range    Sodium 143 136 - 145 mmol/L    Potassium 4.2 3.5 - 5.0 mmol/L    Chloride 108 (H) 98 - 107 mmol/L    Carbon Dioxide (CO2) 27 22 - 31 mmol/L    Anion Gap 8 5 - 18 mmol/L    Urea Nitrogen 18 8 - 22 mg/dL    Creatinine 1.12 0.70 - 1.30 " mg/dL    Calcium 9.2 8.5 - 10.5 mg/dL    Glucose 101 70 - 125 mg/dL    GFR Estimate 80 >60 mL/min/1.73m2   CBC with platelets and differential    Collection Time: 08/29/22  9:41 PM   Result Value Ref Range    WBC Count 8.7 4.0 - 11.0 10e3/uL    RBC Count 4.21 (L) 4.40 - 5.90 10e6/uL    Hemoglobin 13.6 13.3 - 17.7 g/dL    Hematocrit 41.5 40.0 - 53.0 %    MCV 99 78 - 100 fL    MCH 32.3 26.5 - 33.0 pg    MCHC 32.8 31.5 - 36.5 g/dL    RDW 12.9 10.0 - 15.0 %    Platelet Count 136 (L) 150 - 450 10e3/uL    % Neutrophils 57 %    % Lymphocytes 31 %    % Monocytes 10 %    % Eosinophils 1 %    % Basophils 0 %    % Immature Granulocytes 1 %    NRBCs per 100 WBC 0 <1 /100    Absolute Neutrophils 5.0 1.6 - 8.3 10e3/uL    Absolute Lymphocytes 2.7 0.8 - 5.3 10e3/uL    Absolute Monocytes 0.9 0.0 - 1.3 10e3/uL    Absolute Eosinophils 0.1 0.0 - 0.7 10e3/uL    Absolute Basophils 0.0 0.0 - 0.2 10e3/uL    Absolute Immature Granulocytes 0.1 <=0.4 10e3/uL    Absolute NRBCs 0.0 10e3/uL   Asymptomatic COVID-19 Virus (Coronavirus) by PCR Nose    Collection Time: 08/30/22  1:05 AM    Specimen: Nose; Swab   Result Value Ref Range    SARS CoV2 PCR Negative Negative       Total time spent for face to face visit, reviewing labs/imaging studies, counseling and coordination of care was: 1 Hour. More than 50% of this time was spent on counseling and coordination of care.    Dragon software used in the dictation of this note.    Stephania Cobb MD  [facility] - [address], [address]  [place] [address] [phone]

## 2022-08-30 NOTE — ED NOTES
Pt walked around unit and to the restroom with standby assist. Pt did not need assistance walking back to bed. Steady gait. Dr. Archuleta updated.

## 2022-08-30 NOTE — PHARMACY-ADMISSION MEDICATION HISTORY
Pharmacy Note - Admission Medication History    Pertinent Provider Information: none     ______________________________________________________________________    Prior To Admission (PTA) med list completed and updated in EMR.       PTA Med List   Medication Sig Last Dose     ACETAMINOPHEN (TYLENOL ORAL) Take 1,000 mg by mouth every 6 hours as needed Unknown at Unknown time     calcium carbonate (TUMS E-X) 300 mg (750 mg) Chew Take 2 tablets by mouth 3 times daily as needed Unknown at Unknown time     CHOLECALCIFEROL, VITAMIN D3, (VITAMIN D3 ORAL) [CHOLECALCIFEROL, VITAMIN D3, (VITAMIN D3 ORAL)] Take 2,000 Units by mouth daily.  8/29/2022 at Unknown time     ELIQUIS ANTICOAGULANT 5 MG tablet TAKE 1 TABLET BY MOUTH TWICE DAILY. 8/29/2022 at x 2     lamoTRIgine (LAMICTAL) 100 MG tablet Take 100 mg by mouth 2 times daily Along with a 25 mg tablet for a dose = 125 mg 8/29/2022 at x 2     lamoTRIgine (LAMICTAL) 25 MG tablet Take 25 mg by mouth 2 times daily Along with a 100 mg tablet for a dose = 125 mg 8/29/2022 at x 2     METHYLCELLULOSE (CITRUCEL ORAL) Take 2 g by mouth daily as needed Unknown at Unknown time     metoprolol tartrate (LOPRESSOR) 25 MG tablet Take 25 mg by mouth 2 times daily 8/29/2022 at x 2       Information source(s): Family member, Caregiver and Facility (TCU/NH/) medication list/MAR  Method of interview communication: in-person    Summary of Changes to PTA Med List  New: none  Discontinued: none  Changed: none    Patient was asked about OTC/herbal products specifically.  PTA med list reflects this.    In the past week, patient estimated taking medication this percent of the time:  greater than 90%.    Allergies were reviewed, assessed, and updated with the patient.      Patient did not bring any medications to the hospital and can't retrieve from home. No multi-dose medications are available for use during hospital stay.     The information provided in this note is only as accurate as the sources  available at the time of the update(s).    Thank you for the opportunity to participate in the care of this patient.    Pa Jeff RP  8/29/2022 11:13 PM

## 2022-08-30 NOTE — CONSULTS
Care Management Initial Consult    General Information  Assessment completed with: Patient, Parents, mom Royce Iraheta  Type of CM/SW Visit: Initial Assessment    Primary Care Provider verified and updated as needed: Yes   Readmission within the last 30 days: no previous admission in last 30 days   Return Category: Progression of disease  Reason for Consult: discharge planning  Advance Care Planning: Advance Care Planning Reviewed: verified with patient     General Information Comments: mom is present and wants to take him home with her at discharge    Communication Assessment  Patient's communication style: spoken language (English or Bilingual)             Cognitive  Cognitive/Neuro/Behavioral: WDL (at baseline)                      Living Environment:   People in home: other (see comments) (house mates at [facility])     Current living Arrangements: group home ([facility])      Able to return to prior arrangements: yes       Family/Social Support:  Care provided by: other (see comments), parent(s) ([facility] live in caretaker)  Provides care for: no one  Marital Status: Single  Parent(s), Facility resident(s)/Staff          Description of Support System: Supportive, Involved    Support Assessment: Adequate family and caregiver support, Adequate social supports    Current Resources:   Patient receiving home care services: No     Community Resources: DME  Equipment currently used at home: other (see comments) (CPap)  Supplies currently used at home: None    Employment/Financial:  Employment Status:          Financial Concerns: No concerns identified   Referral to Financial Worker: No       Lifestyle & Psychosocial Needs:  Social Determinants of Health     Tobacco Use: Low Risk      Smoking Tobacco Use: Never Smoker     Smokeless Tobacco Use: Never Used   Alcohol Use: Not on file   Financial Resource Strain: Not on file   Food Insecurity: Not on file   Transportation Needs: Not on file    Physical Activity: Not on file   Stress: Not on file   Social Connections: Not on file   Intimate Partner Violence: Not on file   Depression: Not on file   Housing Stability: Not on file       Functional Status:  Prior to admission patient needed assistance:   Dependent ADLs:: Independent, Bathing, Toileting, Grooming  Dependent IADLs:: Cleaning, Cooking, Laundry, Shopping, Meal Preparation, Medication Management, Transportation  Assesssment of Functional Status: Not at baseline with ADL Functioning, Not at baseline with mobility, Not at  functional baseline    Mental Health Status:  Mental Health Status: No Current Concerns       Chemical Dependency Status:                Values/Beliefs:  Spiritual, Cultural Beliefs, Sabianism Practices, Values that affect care:                 Additional Information:  Assessed, lives at [facility], w/other housemates, mom, Royce [phone], is present and discussed ARMSTRONG, she will take him home w/her at discharge and [facility] is [facility] owner.      Christiano James RN

## 2022-08-30 NOTE — ED PROVIDER NOTES
NAME: Lavon Iraheta  AGE: 52 year old male  YOB: 1970  MRN: [id number]  EVALUATION DATE & TIME: 2022  9:13 PM    PCP: Zack Cho    ED PROVIDER: Shun Archuleta M.D.      Chief Complaint   Patient presents with     Fall     Head Injury     Dizziness     FINAL IMPRESSION:  1. Difficulty walking    2. Dizziness    3. Injury of head, initial encounter      MEDICAL DECISION MAKIN:28 PM I met with the patient, obtained history, performed an initial exam, and discussed options and plan for diagnostics and treatment here in the ED. PPE: N95 mask and gloves  9:30 PM I reviewed the patient's imaging results.   9:36 PM I reexamined the patient and obtained more information from his mother.   10:04 PM I rechecked the patient and updated the patient's mother on his CT results. Also shared that the Lamotrigine lab will not be run until tomorrow morning.   12:36 AM I rechecked and updated the patient and his mother.  12:55 AM I discussed the case with hospitalist, Dr Stapleton, who accepts the patient.  Patient was clinically assessed and consented to treatment. After assessment, medical decision making and workup were discussed with the patient. The patient was agreeable to plan for testing, workup, and treatment.  Pertinent Labs & Imaging studies reviewed. (See chart for details)     Lavon Iraheta is a 52 year old male who presents with fall, head injury, dizziness.   Differential diagnosis includes but not limited to intracranial hemorrhage, subdural hemorrhage, postconcussive syndrome, hydrocephalus, subacute CVA.  Patient presenting for fall with head injury.  Patient history difficult to obtain due to his cognitive limitation and difficulty with recall.  Patient with fall and head injury and is on blood thinners.  CT scan of the head the morning of the  was negative however will repeat CT scan.  Repeat CT scan was negative.  Labs CBC, and metabolic panel were unremarkable.   Patient and mother reassured however mother also reports the patient had some difficulty walking the day before the fall and could have possibly contributed to the fall.  He did have cervical spine imaging the morning visit and this was negative.  She does describe episodes of dizziness with movement, nausea, and possibly some visual complaints however patient is not able to articulate these further for me and does not have them currently.  Some of these could be postconcussive however could also be related to abnormal Lamictal level and awaiting this however will not be back until after normal hours on August 30.  Patient ambulated here which did not seem to have much difficulty however per mother's report his walk and his behavior has been abnormal possibly going back to 48 hours ago now.  This would not coincide with the head injury and fall but could possibly have contributed.  Patient normally sees [facility] neurology who recommended CT scan and possible MRI however patient requires full sedation for MRI which I am unable to perform for him tonight.  Phone calls to [facility] and request for bed as this is where his neurologist has privileges and would be able to see him were unsuccessful as they were not excepting transfer patients due to capacity issues.  After discussion with patient and mother we will plan for admission here for neurology consultation, possible MRI, and follow-up of Lamictal level.  Patient comfortable with this plan and mother reassured we will plan for observation admission.    0 minutes of critical care time    MEDICATIONS GIVEN IN THE EMERGENCY:  Medications   ondansetron (ZOFRAN) injection 4 mg (has no administration in time range)   acetaminophen (TYLENOL) tablet 650 mg (650 mg Oral Given 8/29/22 2340)   famotidine (PEPCID) injection 20 mg (20 mg Intravenous Given 8/29/22 2340)       NEW PRESCRIPTIONS STARTED AT TODAY'S ER VISIT:  New Prescriptions    No medications on file       "    =================================================================    HPI    Patient information was obtained from: patient and mother    Use of : N/A         Lavon Iraheta is a 52 year old male with a past medical history of seizure disorder, migraines, paroxysmal atrial fibrillation, and mental retardation, who presents for evaluation following a fall.    Per patient's mother, the patient had a fall at his group home last night (8/28/22). Per triage note, the patient was seen this morning also at [facility] and had a negative CT. He had a headache at that time and was given Tylenol which helped to resolve his pain. Patient was then discharged and went to work, but his mother was then called due a large lump on his head and him feeling dizzy. His mother then spoke to Dr. Lopez and Dr. Crenshaw at [facility] who advised a repeat CT scan and to have Lamotrigine, CMP, and CBC labs done.     Patient endorses feeling tired. He had a right sided headache this morning at the location where he hit his head that transitioned into a migraine like headache that spread to his left side. Patient has a history of migraines. He has not had a headache since it resolved in the ED earlier today. He has also had some eye issues since his CT scan this morning, but denies any blurry vision. He has also been feeling \"fuzzy\" since this morning when changing positions, such as when he stood up to go to his most recent CT scan and felt off. Of note, the patient's sister took the patient to a concert yesterday (8/28/22) and noticed the patient's walk was a little off. He did not fall at that time but did bump into his sister a few times. No other complaints or concerns expressed at this time.       REVIEW OF SYSTEMS   Review of Systems   Constitutional: Positive for fatigue.   Eyes:        Positive for eye issues  Negative for blurry vision   Musculoskeletal: Positive for gait problem.        Positive for fall "   Neurological: Positive for headaches (Resolved).   All other systems reviewed and are negative.       PAST MEDICAL HISTORY:  Past Medical History:   Diagnosis Date     GERD (gastroesophageal reflux disease)      Mental retardation      Seizure disorder (H)        PAST SURGICAL HISTORY:  History reviewed. No pertinent surgical history.    CURRENT MEDICATIONS:      Current Facility-Administered Medications:      ondansetron (ZOFRAN) injection 4 mg, 4 mg, Intravenous, Q30 Min PRN, Shun Archuleta MD    Current Outpatient Medications:      ACETAMINOPHEN (TYLENOL ORAL), Take 1,000 mg by mouth every 6 hours as needed, Disp: , Rfl:      calcium carbonate (TUMS E-X) 300 mg (750 mg) Chew, Take 2 tablets by mouth 3 times daily as needed, Disp: , Rfl:      CHOLECALCIFEROL, VITAMIN D3, (VITAMIN D3 ORAL), [CHOLECALCIFEROL, VITAMIN D3, (VITAMIN D3 ORAL)] Take 2,000 Units by mouth daily. , Disp: , Rfl:      ELIQUIS ANTICOAGULANT 5 MG tablet, TAKE 1 TABLET BY MOUTH TWICE DAILY., Disp: 60 tablet, Rfl: 5     lamoTRIgine (LAMICTAL) 100 MG tablet, Take 100 mg by mouth 2 times daily Along with a 25 mg tablet for a dose = 125 mg, Disp: , Rfl:      lamoTRIgine (LAMICTAL) 25 MG tablet, Take 25 mg by mouth 2 times daily Along with a 100 mg tablet for a dose = 125 mg, Disp: , Rfl:      METHYLCELLULOSE (CITRUCEL ORAL), Take 2 g by mouth daily as needed, Disp: , Rfl:      metoprolol tartrate (LOPRESSOR) 25 MG tablet, Take 25 mg by mouth 2 times daily, Disp: , Rfl:     ALLERGIES:  Allergies   Allergen Reactions     Biaxin [Clarithromycin] Unknown     Codeine Unknown     Diphenhydramine Hcl [Diphenhydramine] Unknown     Levofloxacin Unknown     Morphine Unknown       FAMILY HISTORY:  History reviewed. No pertinent family history.    SOCIAL HISTORY:   Social History     Socioeconomic History     Marital status: Single   Tobacco Use     Smoking status: Never Smoker     Smokeless tobacco: Never Used   Social History Narrative    He is  "a resident of a group home (12/22/2014)       PHYSICAL EXAM:    Vitals: BP (!) 155/81   Pulse 73   Temp 98.6  F (37  C) (Temporal)   Resp 16   Ht 1.816 m (5' 11.5\")   Wt 102.1 kg (225 lb)   SpO2 97%   BMI 30.94 kg/m     Physical Exam  Vitals and nursing note reviewed.   Constitutional:       General: He is not in acute distress.     Appearance: Normal appearance. He is normal weight. He is not ill-appearing or toxic-appearing.   HENT:      Head: Normocephalic.        Nose: Nose normal.      Mouth/Throat:      Mouth: Mucous membranes are moist.      Pharynx: Oropharynx is clear.   Eyes:      Extraocular Movements: Extraocular movements intact.      Conjunctiva/sclera: Conjunctivae normal.      Pupils: Pupils are equal, round, and reactive to light.   Cardiovascular:      Rate and Rhythm: Normal rate and regular rhythm.      Heart sounds: Normal heart sounds.   Pulmonary:      Effort: Pulmonary effort is normal. No respiratory distress.      Breath sounds: Normal breath sounds.   Abdominal:      General: There is no distension.      Palpations: Abdomen is soft.      Tenderness: There is no abdominal tenderness. There is no guarding or rebound.   Musculoskeletal:         General: No tenderness, deformity or signs of injury.      Cervical back: Normal range of motion. No tenderness.   Skin:     General: Skin is warm and dry.   Neurological:      General: No focal deficit present.      Mental Status: He is alert and oriented to person, place, and time.      Cranial Nerves: No cranial nerve deficit.      Coordination: Coordination normal.      Gait: Gait normal.   Psychiatric:         Mood and Affect: Mood normal.           LAB:  All pertinent labs reviewed and interpreted.  Labs Ordered and Resulted from Time of ED Arrival to Time of ED Departure   BASIC METABOLIC PANEL - Abnormal       Result Value    Sodium 143      Potassium 4.2      Chloride 108 (*)     Carbon Dioxide (CO2) 27      Anion Gap 8      Urea " Nitrogen 18      Creatinine 1.12      Calcium 9.2      Glucose 101      GFR Estimate 80     CBC WITH PLATELETS AND DIFFERENTIAL - Abnormal    WBC Count 8.7      RBC Count 4.21 (*)     Hemoglobin 13.6      Hematocrit 41.5      MCV 99      MCH 32.3      MCHC 32.8      RDW 12.9      Platelet Count 136 (*)     % Neutrophils 57      % Lymphocytes 31      % Monocytes 10      % Eosinophils 1      % Basophils 0      % Immature Granulocytes 1      NRBCs per 100 WBC 0      Absolute Neutrophils 5.0      Absolute Lymphocytes 2.7      Absolute Monocytes 0.9      Absolute Eosinophils 0.1      Absolute Basophils 0.0      Absolute Immature Granulocytes 0.1      Absolute NRBCs 0.0     COVID-19 VIRUS (CORONAVIRUS) BY PCR - Normal    SARS CoV2 PCR Negative     LAMOTRIGINE LEVEL       RADIOLOGY:  Head CT w/o contrast   Final Result   IMPRESSION:   1.  No acute intracranial process.          PROCEDURES:   Procedures       I, Ellen Amaya, am serving as a scribe to document services personally performed by Dr. Shun Archuleta  based on my observation and the provider's statements to me. I, Shun Archuleta MD attest that Ellen Amaya is acting in a scribe capacity, has observed my performance of the services and has documented them in accordance with my direction.      Shun Archuleta M.D.  Emergency Medicine  [facility]     Shun Archuleta MD  08/30/22 [id number]

## 2022-08-30 NOTE — PROGRESS NOTES
Attempted to see the patient, but he is off the floor for a sedation MRI. Will attempt to see him again tomorrow.

## 2022-08-30 NOTE — PROGRESS NOTES
08/30/22 1449   Quick Adds   Quick Adds Certification   Type of Visit Initial Occupational Therapy Evaluation   Living Environment   People in Home other (see comments)   Current Living Arrangements group home  (3 other guys)   Home Accessibility stairs to enter home;stairs within home   Number of Stairs, Main Entrance 3  (split entry, 3 to get in the house, 10 steps to get to main level, 5 steps to get to the upper level)   Number of Stairs, Within Home, Primary greater than 10 stairs   Living Environment Comments Pt's bedroom is upstairs, pt's bathroom is on lower level for use of walk-in shower.   Self-Care   Fall history within last six months yes   Number of times patient has fallen within last six months 2   Activity/Exercise/Self-Care Comment Pt IND with dressing and toileting. Pt gets assistance with shower transfer, bathing, and SBA while in the shower. Pt IND with toileting and mobility.   Instrumental Activities of Daily Living (IADL)   IADL Comments Pt assists with cooking. Pt lives in a group home and has assistance with IADLs.   General Information   Onset of Illness/Injury or Date of Surgery 08/29/22   Referring Physician Naina Oshea MD   Additional Occupational Profile Info/Pertinent History of Current Problem Per chart review, pt is a 53 y/o male with history of developeental delay, a fib on eliquis who presents with evidence of trauma to the head and abrasion to the right arm.   Existing Precautions/Restrictions fall   Limitations/Impairments safety/cognitive   General Observations and Info Pt reporting dizziness.   Cognitive Status Examination   Cognitive Status Comments Pt has history of a development delay. Pt had difficulty communicating answers to therapist's questions d/t speech impairment, however pt's mother present during session and was able to answer questions as needed.   Pain Assessment   Patient Currently in Pain No   Range of Motion Comprehensive   General Range of Motion  no range of motion deficits identified   Strength Comprehensive (MMT)   General Manual Muscle Testing (MMT) Assessment no strength deficits identified   Bed Mobility   Bed Mobility supine-sit;sit-supine   Supine-Sit [place] (Bed Mobility) contact guard   Sit-Supine [place] (Bed Mobility) supervision   Assistive Device (Bed Mobility) bed rails   Comment (Bed Mobility) dizziness with supine>sitting EOB   Transfers   Transfers sit-stand transfer;toilet transfer   Sit-Stand Transfer   Sit-Stand [place] (Transfers) contact guard   Assistive Device (Sit-Stand Transfers) walker, front-wheeled   Toilet Transfer   Type (Toilet Transfer) sit-stand;stand-sit   [place] Level (Toilet Transfer) contact guard;verbal cues   Assistive Device (Toilet Transfer) walker, front-wheeled   Balance   Balance Comments Pt unsteady with ambulation and transfers.   Activities of Daily Living   BADL Assessment/Intervention lower body dressing;grooming;toileting   Lower Body Dressing Assessment/Training   Comment, (Lower Body Dressing) Pt had to do multiple attempts of doffing/donning socks d/t wearing 2 pairs of socks upon arrival. Pt initially required SBA then dependent on later attempts d/t increased dizziness.   Grooming Assessment/Training   [place] Level (Grooming) contact guard assist;verbal cues   Toileting   [place] Level (Toileting) contact guard assist;verbal cues   Clinical Impression   Criteria for Skilled Therapeutic Interventions Met (OT) Yes, treatment indicated   OT Diagnosis Impaired ability to perform ADLs and functional mobility.   Influenced by the following impairments fall   OT Problem List-Impairments impacting ADL problems related to;activity tolerance impaired;balance;cognition;communication;mobility;sensory feedback   Assessment of Occupational Performance 3-5 Performance Deficits   Identified Performance Deficits bed mobility, transfers, functional mobility, toileting, lower body dressing    Planned Therapy Interventions (OT) ADL retraining;IADL retraining;balance training;bed mobility training;cognition;transfer training;home program guidelines;progressive activity/exercise   Clinical Decision Making Complexity (OT) moderate complexity   Risk & Benefits of therapy have been explained evaluation/treatment results reviewed;care plan/treatment goals reviewed;risks/benefits reviewed;current/potential barriers reviewed;participants voiced agreement with care plan;participants included;patient;mother   Clinical Impression Comments Pt would benefit from skilled OT services to promote ability to perform ADLs and functional mobility.   OT Discharge Planning   OT Discharge Recommendation (DC Rec) home with assist;Transitional Care Facility   OT Rationale for DC Rec Pending progress. Pt currently requires Ax1 for safe functional mobility and ADLs due to dizziness. If pt's group home is able to provide 24/7 assist of 1, pt able to discharge home. If this level of assistance is not available, recommend TCU.   Therapy Certification   Start of Care Date 08/30/22   Certification date from 08/30/22   Certification date to 09/06/22   Medical Diagnosis fall   Total Evaluation Time (Minutes)   Total Evaluation Time (Minutes) 15   OT Goals   Therapy Frequency (OT) Daily   OT Predicted Duration/Target Date for Goal Attainment 09/06/22   OT Goals Hygiene/Grooming;Lower Body Dressing;Bed Mobility;Transfers;Toilet Transfer/Toileting   OT: Hygiene/Grooming modified independent;using adaptive equipment;while standing   OT: Lower Body Dressing Modified independent;using adaptive equipment;including set-up/clothing retrieval   OT: Bed Mobility Modified independent;supine to/from sitting   OT: Transfer Modified independent;with assistive device   OT: Toilet Transfer/Toileting Modified independent;toilet transfer;cleaning and garment management;using adaptive equipment

## 2022-08-31 ENCOUNTER — APPOINTMENT (OUTPATIENT)
Dept: PHYSICAL THERAPY | Facility: HOSPITAL | Age: 52
End: 2022-08-31
Attending: INTERNAL MEDICINE
Payer: MEDICARE

## 2022-08-31 VITALS
HEART RATE: 80 BPM | SYSTOLIC BLOOD PRESSURE: 112 MMHG | TEMPERATURE: 98.7 F | WEIGHT: 225 LBS | BODY MASS INDEX: 30.48 KG/M2 | HEIGHT: 72 IN | RESPIRATION RATE: 18 BRPM | DIASTOLIC BLOOD PRESSURE: 64 MMHG | OXYGEN SATURATION: 94 %

## 2022-08-31 LAB — LAMOTRIGINE SERPL-MCNC: 7.6 UG/ML

## 2022-08-31 PROCEDURE — 99215 OFFICE O/P EST HI 40 MIN: CPT | Performed by: PSYCHIATRY & NEUROLOGY

## 2022-08-31 PROCEDURE — 250N000013 HC RX MED GY IP 250 OP 250 PS 637: Performed by: INTERNAL MEDICINE

## 2022-08-31 PROCEDURE — 258N000003 HC RX IP 258 OP 636: Performed by: INTERNAL MEDICINE

## 2022-08-31 PROCEDURE — 99214 OFFICE O/P EST MOD 30 MIN: CPT | Performed by: INTERNAL MEDICINE

## 2022-08-31 PROCEDURE — 97162 PT EVAL MOD COMPLEX 30 MIN: CPT | Mod: GP

## 2022-08-31 PROCEDURE — 97116 GAIT TRAINING THERAPY: CPT | Mod: GP

## 2022-08-31 PROCEDURE — 97530 THERAPEUTIC ACTIVITIES: CPT | Mod: GP

## 2022-08-31 PROCEDURE — G0378 HOSPITAL OBSERVATION PER HR: HCPCS

## 2022-08-31 PROCEDURE — 99217 PR OBSERVATION CARE DISCHARGE: CPT | Performed by: INTERNAL MEDICINE

## 2022-08-31 RX ORDER — LAMOTRIGINE 100 MG/1
100 TABLET ORAL 2 TIMES DAILY
Status: DISCONTINUED | OUTPATIENT
Start: 2022-08-31 | End: 2022-08-31 | Stop reason: HOSPADM

## 2022-08-31 RX ORDER — LAMOTRIGINE 25 MG/1
25 TABLET ORAL 2 TIMES DAILY
Status: DISCONTINUED | OUTPATIENT
Start: 2022-08-31 | End: 2022-08-31 | Stop reason: HOSPADM

## 2022-08-31 RX ADMIN — SODIUM CHLORIDE 1000 ML: 9 INJECTION, SOLUTION INTRAVENOUS at 12:05

## 2022-08-31 RX ADMIN — LAMOTRIGINE 100 MG: 100 TABLET ORAL at 12:17

## 2022-08-31 RX ADMIN — LAMOTRIGINE 25 MG: 25 TABLET ORAL at 12:17

## 2022-08-31 RX ADMIN — METOPROLOL TARTRATE 25 MG: 25 TABLET, FILM COATED ORAL at 09:07

## 2022-08-31 ASSESSMENT — ACTIVITIES OF DAILY LIVING (ADL)
ADLS_ACUITY_SCORE: 35

## 2022-08-31 NOTE — PROGRESS NOTES
[facility]      OUTPATIENT PHYSICAL THERAPY EVALUATION  PLAN OF TREATMENT FOR OUTPATIENT REHABILITATION  (COMPLETE FOR INITIAL CLAIMS ONLY)  Patient's Last Name, First Name, M.I.  YOB: 1970  MyrtleLavon SCHMIDT                        Provider's Name  [facility] Medical Record No.  [id number]                               Onset Date:  08/29/22   Start of Care Date:         Type:     _X_PT   ___OT   ___SLP Medical Diagnosis:                           PT Diagnosis:      Visits from SOC:  1   _________________________________________________________________________________  Plan of Treatment/Functional Goals    Planned Interventions:       Goals: See Physical Therapy Goals on Care Plan in [employer] electronic health record.    Therapy Frequency:    Predicted Duration of Therapy Intervention:    _________________________________________________________________________________    I CERTIFY THE NEED FOR THESE SERVICES FURNISHED UNDER        THIS PLAN OF TREATMENT AND WHILE UNDER MY CARE     (Physician co-signature of this document indicates review and certification of the therapy plan).              Certification date from: (P) 08/31/22,      Referring Physician: IVÁN Oshea            Initial Assessment        See Physical Therapy evaluation dated   in Epic electronic health record.

## 2022-08-31 NOTE — PLAN OF CARE
Physical Therapy Discharge Summary    Reason for therapy discharge:    Discharged to home with outpatient therapy.    Progress towards therapy goal(s). See goals on Care Plan in [facility] electronic health record for goal details.  Eval only    Therapy recommendation(s):    Continued PT is recommended

## 2022-08-31 NOTE — PLAN OF CARE
"PRIMARY DIAGNOSIS: \"GENERIC\" NURSING  OUTPATIENT/OBSERVATION GOALS TO BE MET BEFORE DISCHARGE:  ADLs back to baseline: Yes    Activity and level of assistance: Up with standby assistance.    Pain status: Improved-controlled with oral pain medications.    Return to near baseline physical activity: Yes     Discharge Planner Nurse   Safe discharge environment identified: Yes  Barriers to discharge: Yes       Entered by: Kade Sy RN 08/31/2022 5:04 AM     Please review provider order for any additional goals.   Nurse to notify provider when observation goals have been met and patient is ready for discharge.    Alert to self. On room air. Continent x 2. Stand by assist, unsteady gait. IV saline lock. Urinary was negative.  Complained of pain at the right side of the head, tylenol given x1 and was effective.   "

## 2022-08-31 NOTE — PLAN OF CARE
Problem: Plan of Care - These are the overarching goals to be used throughout the patient stay.    Goal: Absence of Hospital-Acquired Illness or Injury  Intervention: Identify and Manage Fall Risk  Recent Flowsheet Documentation  Taken 8/31/2022 1300 by Naomy Choi RN  Safety Promotion/Fall Prevention: nonskid shoes/slippers when out of bed  Taken 8/31/2022 0800 by Naomy Choi RN  Safety Promotion/Fall Prevention: nonskid shoes/slippers when out of bed  Intervention: Prevent Skin Injury  Recent Flowsheet Documentation  Taken 8/31/2022 1300 by Naomy Choi RN  Body Position: position changed independently  Taken 8/31/2022 0820 by Naomy Choi RN  Body Position: position changed independently  Taken 8/31/2022 0800 by Naomy Choi RN  Body Position: position changed independently  Intervention: Prevent and Manage VTE (Venous Thromboembolism) Risk  Recent Flowsheet Documentation  Taken 8/31/2022 1300 by Naomy Choi RN  Activity Management: activity adjusted per tolerance  Taken 8/31/2022 0820 by Naomy Choi RN  Activity Management: (sitting on edge of bed for breakfast) other (see comments)  Taken 8/31/2022 0800 by Naomy Choi RN  Activity Management: activity adjusted per tolerance   Goal Outcome Evaluation:      Alert cooperative with cares speech slow up with assist of one co feeling  Dizzy when standing. Ortho blood pressures done, see graphics fluid flush   Started, called cardiac care to put home monitor on before discharge. Home medications restarted, mother here with patient has many questions about returning  To group home, doctor and social service to speak with her.

## 2022-08-31 NOTE — DISCHARGE SUMMARY
[facility]  Hospitalist Discharge Summary      Date of Admission:  8/29/2022  Date of Discharge:  8/31/2022  Discharging Provider: Naina Oshea MD  Discharge Service: Hospitalist Service    Discharge Diagnoses   Active Problems:    Dizziness    Difficulty walking    Injury of head, initial encounter    Chronic atrial fibrillation (H)    Long term current use of anticoagulant therapy    Factor V Leiden (H)    Cerebral palsy (H)    Hyperlipidemia LDL goal <100    DVT (deep venous thrombosis) (H)    Epilepsy (H)    Sleep apnea      Follow-ups Needed After Discharge   Follow-up Appointments     Follow-up and recommended labs and tests       Follow up with primary care provider, Zack Cho, within   3-5days, for hospital follow- up. The following labs/tests are   recommended: BMP  Patient to arrange follow-up with Neurology at [facility]  Patient already has a cardiology appointment with Dr. Antunez             Unresulted Labs Ordered in the Past 30 Days of this Admission     No orders found from 7/30/2022 to 8/30/2022.          Discharge Disposition   Discharged to home  Condition at discharge: Stable      Hospital Course   Lavon Iraheta is a 52 year old male presenting with fall and dizziness     H/O epilepsy and followed at [facility] with dizziness and falls  - appreciate neurology consult cleared for discharge from their perspective and patient to follow-up with his neurologist at [facility]   - MRI/MRA head and neck. No acute pathology   - PT/OT   - ok to resume eliquis  - lamotrigine level 7.6  - CT x2 negative   - no evidence of infection normal CBC, UA unremarkable  - per neurology would hold on traveling for now  - outpatient PT for vestibular training      Afib per mother had ablation and was in sinus rhythm   - eliquis resumed  - continue metoprolol  - cardiology consult appreciated. Attempted event monitor but patient unable to use and per cardiology will  hold for now and he has follow-up with        Factor V Abigail with h/o DVT  - resume eliquis      Head injury   - CT x2 no acute findings      Consultations This Hospital Stay   NEUROLOGY IP CONSULT  CARE MANAGEMENT / SOCIAL WORK IP CONSULT  CARDIOLOGY IP CONSULT  OCCUPATIONAL THERAPY ADULT IP CONSULT  PHYSICAL THERAPY ADULT IP CONSULT    Code Status   Full Code    Time Spent on this Encounter          Naina Oshea MD  [address] [address]  Phone: [phone]  Fax: [phone]  ______________________________________________________________________    Physical Exam   Vital Signs: Temp: 98.7  F (37.1  C) Temp src: Oral BP: 112/64 Pulse: 80   Resp: 18 SpO2: 94 % O2 Device: None (Room air)    Weight: 225 lbs 0 oz  Physical Exam  Constitutional:       Appearance: He is obese.   HENT:      Head:      Comments: Bruising left frontal/temporal area no change  Cardiovascular:      Rate and Rhythm: Normal rate. Rhythm irregular.      Pulses: Normal pulses.      Heart sounds: Normal heart sounds.   Pulmonary:      Effort: Pulmonary effort is normal.      Breath sounds: Normal breath sounds.   Abdominal:      General: Bowel sounds are normal.      Palpations: Abdomen is soft.   Musculoskeletal:         General: Normal range of motion.      Right lower leg: No edema.      Left lower leg: No edema.   Skin:     General: Skin is warm.   Neurological:      Mental Status: He is alert. Mental status is at baseline.      Comments: No lateralizing focal deficits               Primary Care Physician   Zack Cho    Discharge Orders      Reason for your hospital stay    Active Problems:    Dizziness    Difficulty walking    Injury of head, initial encounter    Chronic atrial fibrillation (H)    Long term current use of anticoagulant therapy    Factor V Leiden (H)    Cerebral palsy (H)    Hyperlipidemia LDL goal <100    DVT (deep venous thrombosis)  (H)    Epilepsy (H)    Sleep apnea     Follow-up and recommended labs and tests     Follow up with primary care provider, Zack Cho, within 3-5days, for hospital follow- up. The following labs/tests are recommended: BMP  Patient to arrange follow-up with Neurology at [facility]  Patient already has a cardiology appointment with Dr. Antunez     Activity    Your activity upon discharge: activity as tolerated  When going from lying to sitting would do foot pump 5-10 times before standing and then ambulate.     Diet    Follow this diet upon discharge: Orders Placed This Encounter      Regular Diet Adult       Significant Results and Procedures   Most Recent 3 CBC's:  Recent Labs   Lab Test 08/29/22  2141 05/18/20  1605 05/23/18  1643   WBC 8.7 7.3 6.6   HGB 13.6 13.9* 13.4*   MCV 99 99 101*   * 127* 144     Most Recent 3 BMP's:  Recent Labs   Lab Test 08/29/22  2141 02/18/22  1517 10/25/21  1618    142 144   POTASSIUM 4.2 4.1 4.0   CHLORIDE 108* 107 108*   CO2 27 27 25   BUN 18 16 19   CR 1.12 1.11 1.05   ANIONGAP 8 8 11   VICENTA 9.2 9.6 9.8    91 111   ,   Results for orders placed or performed during the hospital encounter of 08/29/22   Head CT w/o contrast    Narrative    EXAM: CT HEAD W/O CONTRAST  LOCATION: [facility]  DATE/TIME: 8/29/2022 9:27 PM    INDICATION: head injury  COMPARISON: CT head performed earlier on the same day. MRI dated 01/25/2008.  TECHNIQUE: Routine CT Head without IV contrast. Multiplanar reformats. Dose reduction techniques were used.    FINDINGS:  Motion and streak artifact limit the evaluation of the skull base and posterior fossa.    INTRACRANIAL CONTENTS: No intracranial hemorrhage, extraaxial collection, or mass effect.  No CT evidence of acute infarct. Mild presumed chronic small vessel ischemic changes. Normal ventricles and sulci for patients age.     VISUALIZED ORBITS/SINUSES/MASTOIDS: No intraorbital abnormality. Mild mucosal  thickening scattered about the paranasal sinuses without air-fluid levels. No mastoid effusion.    BONES/SOFT TISSUES: No acute abnormality.      Impression    IMPRESSION:  1.  No acute intracranial process.   MR Brain w/o & w Contrast    Narrative    EXAM: MR BRAIN W/O and W CONTRAST, MRA BRAIN ([place]) W/O CONTRAST, MRA NECK (CAROTIDS) W/O and W CONTRAST  LOCATION: [facility]  DATE/TIME: 8/30/2022 5:13 PM    INDICATION: Fall with dizziness  COMPARISON: CT dated 08/29/2022 and MRI dated 01/25/2008.  CONTRAST: 10mL gadavist  TECHNIQUE:   1) Routine multiplanar multisequence head MRI without and with intravenous contrast.  2) 3D time-of-flight head MRA without intravenous contrast.  3) Neck MRA without and with IV contrast. Stenosis measurements made according to NASCET criteria unless otherwise specified.    FINDINGS:  Motion degraded exam.    HEAD MRI:  INTRACRANIAL CONTENTS: No acute or subacute infarct. No mass, acute hemorrhage, or extra-axial fluid collections. Scattered nonspecific T2/FLAIR hyperintensities within the cerebral white matter most consistent with mild chronic microvascular ischemic   change. Mild generalized cerebral atrophy. No hydrocephalus. Normal position of the cerebellar tonsils. No pathologic contrast enhancement.    SELLA: No abnormality accounting for technique.    OSSEOUS STRUCTURES/SOFT TISSUES: Normal marrow signal.      ORBITS: Prior bilateral cataract surgery. Visualized portions of the orbits are otherwise unremarkable.     SINUSES/MASTOIDS: Mild mucosal thickening scattered about the paranasal sinuses without air-fluid levels. No mastoid effusion.     HEAD MRA:   ANTERIOR CIRCULATION: No stenosis/occlusion, aneurysm, or high flow vascular malformation. Standard [place] of [place] anatomy.    POSTERIOR CIRCULATION: No stenosis/occlusion, aneurysm, or high flow vascular malformation. Balanced vertebral arteries supply a normal basilar artery.      NECK MRA:   RIGHT CAROTID: No measurable stenosis or dissection.    LEFT CAROTID: No measurable stenosis or dissection.    VERTEBRAL ARTERIES: No focal stenosis or dissection. Balanced vertebral arteries.    AORTIC ARCH: Bovine origin left common carotid artery. No significant stenosis at the origin of the great vessels.      Impression    IMPRESSION:  Within the limitation of motion artifact:    HEAD MRI:   1.  No acute intracranial finding.  2.  Mild presumed cerebral white matter chronic microvascular ischemic change.    HEAD MRA:   1.  Normal MRA [place].    NECK MRA:  1.  Normal neck MRA.   MRA Neck (Carotids) wo & w Contrast    Narrative    EXAM: MR BRAIN W/O and W CONTRAST, MRA BRAIN ([place]) W/O CONTRAST, MRA NECK (CAROTIDS) W/O and W CONTRAST  LOCATION: [facility]  DATE/TIME: 8/30/2022 5:13 PM    INDICATION: Fall with dizziness  COMPARISON: CT dated 08/29/2022 and MRI dated 01/25/2008.  CONTRAST: 10mL gadavist  TECHNIQUE:   1) Routine multiplanar multisequence head MRI without and with intravenous contrast.  2) 3D time-of-flight head MRA without intravenous contrast.  3) Neck MRA without and with IV contrast. Stenosis measurements made according to NASCET criteria unless otherwise specified.    FINDINGS:  Motion degraded exam.    HEAD MRI:  INTRACRANIAL CONTENTS: No acute or subacute infarct. No mass, acute hemorrhage, or extra-axial fluid collections. Scattered nonspecific T2/FLAIR hyperintensities within the cerebral white matter most consistent with mild chronic microvascular ischemic   change. Mild generalized cerebral atrophy. No hydrocephalus. Normal position of the cerebellar tonsils. No pathologic contrast enhancement.    SELLA: No abnormality accounting for technique.    OSSEOUS STRUCTURES/SOFT TISSUES: Normal marrow signal.      ORBITS: Prior bilateral cataract surgery. Visualized portions of the orbits are otherwise unremarkable.      SINUSES/MASTOIDS: Mild mucosal thickening scattered about the paranasal sinuses without air-fluid levels. No mastoid effusion.     HEAD MRA:   ANTERIOR CIRCULATION: No stenosis/occlusion, aneurysm, or high flow vascular malformation. Standard [place] of [place] anatomy.    POSTERIOR CIRCULATION: No stenosis/occlusion, aneurysm, or high flow vascular malformation. Balanced vertebral arteries supply a normal basilar artery.     NECK MRA:   RIGHT CAROTID: No measurable stenosis or dissection.    LEFT CAROTID: No measurable stenosis or dissection.    VERTEBRAL ARTERIES: No focal stenosis or dissection. Balanced vertebral arteries.    AORTIC ARCH: Bovine origin left common carotid artery. No significant stenosis at the origin of the great vessels.      Impression    IMPRESSION:  Within the limitation of motion artifact:    HEAD MRI:   1.  No acute intracranial finding.  2.  Mild presumed cerebral white matter chronic microvascular ischemic change.    HEAD MRA:   1.  Normal MRA [place] of [place].    NECK MRA:  1.  Normal neck MRA.   MRA Brain ([place] of [place]) wo Contrast    Narrative    EXAM: MR BRAIN W/O and W CONTRAST, MRA BRAIN ([place]) W/O CONTRAST, MRA NECK (CAROTIDS) W/O and W CONTRAST  LOCATION: [facility]  DATE/TIME: 8/30/2022 5:13 PM    INDICATION: Fall with dizziness  COMPARISON: CT dated 08/29/2022 and MRI dated 01/25/2008.  CONTRAST: 10mL gadavist  TECHNIQUE:   1) Routine multiplanar multisequence head MRI without and with intravenous contrast.  2) 3D time-of-flight head MRA without intravenous contrast.  3) Neck MRA without and with IV contrast. Stenosis measurements made according to NASCET criteria unless otherwise specified.    FINDINGS:  Motion degraded exam.    HEAD MRI:  INTRACRANIAL CONTENTS: No acute or subacute infarct. No mass, acute hemorrhage, or extra-axial fluid collections. Scattered nonspecific T2/FLAIR hyperintensities within the cerebral white matter  most consistent with mild chronic microvascular ischemic   change. Mild generalized cerebral atrophy. No hydrocephalus. Normal position of the cerebellar tonsils. No pathologic contrast enhancement.    SELLA: No abnormality accounting for technique.    OSSEOUS STRUCTURES/SOFT TISSUES: Normal marrow signal.      ORBITS: Prior bilateral cataract surgery. Visualized portions of the orbits are otherwise unremarkable.     SINUSES/MASTOIDS: Mild mucosal thickening scattered about the paranasal sinuses without air-fluid levels. No mastoid effusion.     HEAD MRA:   ANTERIOR CIRCULATION: No stenosis/occlusion, aneurysm, or high flow vascular malformation. Standard [place] of [place] anatomy.    POSTERIOR CIRCULATION: No stenosis/occlusion, aneurysm, or high flow vascular malformation. Balanced vertebral arteries supply a normal basilar artery.     NECK MRA:   RIGHT CAROTID: No measurable stenosis or dissection.    LEFT CAROTID: No measurable stenosis or dissection.    VERTEBRAL ARTERIES: No focal stenosis or dissection. Balanced vertebral arteries.    AORTIC ARCH: Bovine origin left common carotid artery. No significant stenosis at the origin of the great vessels.      Impression    IMPRESSION:  Within the limitation of motion artifact:    HEAD MRI:   1.  No acute intracranial finding.  2.  Mild presumed cerebral white matter chronic microvascular ischemic change.    HEAD MRA:   1.  Normal MRA [place] of [place].    NECK MRA:  1.  Normal neck MRA.       Discharge Medications   Current Discharge Medication List      CONTINUE these medications which have NOT CHANGED    Details   ACETAMINOPHEN (TYLENOL ORAL) Take 1,000 mg by mouth every 6 hours as needed      calcium carbonate (TUMS E-X) 300 mg (750 mg) Chew Take 2 tablets by mouth 3 times daily as needed      CHOLECALCIFEROL, VITAMIN D3, (VITAMIN D3 ORAL) [CHOLECALCIFEROL, VITAMIN D3, (VITAMIN D3 ORAL)] Take 2,000 Units by mouth daily.       ELIQUIS ANTICOAGULANT 5 MG tablet TAKE  1 TABLET BY MOUTH TWICE DAILY.  Qty: 60 tablet, Refills: 5    Comments: MMO/23  Associated Diagnoses: Typical atrial flutter (H)      !! lamoTRIgine (LAMICTAL) 100 MG tablet Take 100 mg by mouth 2 times daily Along with a 25 mg tablet for a dose = 125 mg      !! lamoTRIgine (LAMICTAL) 25 MG tablet Take 25 mg by mouth 2 times daily Along with a 100 mg tablet for a dose = 125 mg      METHYLCELLULOSE (CITRUCEL ORAL) Take 2 g by mouth daily as needed      metoprolol tartrate (LOPRESSOR) 25 MG tablet Take 25 mg by mouth 2 times daily       !! - Potential duplicate medications found. Please discuss with provider.        Allergies   Allergies   Allergen Reactions     Biaxin [Clarithromycin] Unknown     Codeine Unknown     Diphenhydramine Hcl [Diphenhydramine] Unknown     Levofloxacin Unknown     Morphine Unknown

## 2022-08-31 NOTE — PLAN OF CARE
"PRIMARY DIAGNOSIS: \"GENERIC\" NURSING  OUTPATIENT/OBSERVATION GOALS TO BE MET BEFORE DISCHARGE:  ADLs back to baseline: Yes    Activity and level of assistance: Up with standby assistance.    Pain status: Improved-controlled with oral pain medications.    Return to near baseline physical activity: Yes     Discharge Planner Nurse   Safe discharge environment identified: Yes  Barriers to discharge: Yes       Entered by: Kade Sy RN 08/31/2022 12:02 AM     Please review provider order for any additional goals.   Nurse to notify provider when observation goals have been met and patient is ready for discharge.  "

## 2022-08-31 NOTE — PROGRESS NOTES
NEUROLOGY PROGRESS NOTE     ASSESSMENT & PLAN     Diagnosis: Ataxia, dizziness, recent head injury.     52 year old male here for history of recent fall and dizziness.  He had head and cervical CT completed yesterday morning after his fall which was negative/normal.  Mother however noticed that he was having some difficulties with walking, and continued dizziness and visual complaints so she brought him into the emergency room for further evaluation.  Mother did check in with his neurologist at [facility].  They recommended possibly getting an MRI for which Lavon require sedation.  He has additional history of seizures and is on Lamictal.        Lamotrigine level pending.  Resume PTA dosing (125 mg twice daily) for history of seizures.  Additional adjustments can be made by Lavon's outpatient neurology once the lamotrigine level is back.    Irregular heartbeat and fluctuating heart rates on exam.  EKG pending.  Known history of A. fib on Eliquis, which is being held.    Head CTx2/cervical CT unremarkable.    MRI brain/MRA head and neck did not show any acute findings, no significant stenosis.    I do not think that Lavon should be leaving town until he is back to his baseline.  I do have some concerns [place] if [place] is potential for him to have recurrent dizziness.    A. Fib, previous ablation.  Continue telemetry.  Cardiology has been consulted, appreciate recs.  Eliquis is on hold due to factor V Leiden mutation.  I agree with resuming the Eliquis at this point.  Plan is for outpatient telemetry.    Physical therapy - vestibular therapy, OT    Neurology will sign off.  If the dizziness continues, Lavon may need outpatient PT.         Neurology Discharge Planning: Per primary team, therapy recommendations.    Patient Active Problem List    Diagnosis Date Noted     Dizziness 08/30/2022     Priority: Medium     Difficulty walking 08/30/2022     Priority: Medium     Injury of head, initial  encounter 08/30/2022     Priority: Medium     Chronic atrial fibrillation (H) 08/30/2022     Priority: Medium     Long term current use of anticoagulant therapy 08/30/2022     Priority: Medium     Factor V Leiden (H) 08/30/2022     Priority: Medium     Cerebral palsy (H) 08/30/2022     Priority: Medium     Hyperlipidemia LDL goal <100 08/30/2022     Priority: Medium     DVT (deep venous thrombosis) (H) 08/30/2022     Priority: Medium     Epilepsy (H) 08/30/2022     Priority: Medium     Sleep apnea 08/30/2022     Priority: Medium     Anticoagulation adequate 12/07/2020     Priority: Medium     Paroxysmal atrial fibrillation (H) 02/27/2015     Priority: Medium     Medical History  Past Medical History:   Diagnosis Date     Cerebral palsy (H) 8/30/2022     Chronic atrial fibrillation (H) 8/30/2022     DVT (deep venous thrombosis) (H) 8/30/2022     Epilepsy (H) 8/30/2022     Factor V Leiden (H) 8/30/2022     GERD (gastroesophageal reflux disease)      Hyperlipidemia LDL goal <100 8/30/2022     Long term current use of anticoagulant therapy 8/30/2022     Mental retardation      Seizure disorder (H)      Sleep apnea 8/30/2022        SUBJECTIVE     Patient complained of headache overnight, Tylenol given with pain resolution.    Lavon says he is still dizzy upon standing and walking.  Mother is at bedside and says that he seems about the same.    I ask about Lavon's seizure history and these are described as staring spells.  He has not had one for a few years.  Lavon says he would like to go home.     OBJECTIVE     Vital signs in last 24 hours  Temp:  [97.4  F (36.3  C)-98.4  F (36.9  C)] 97.8  F (36.6  C)  Pulse:  [] 74  Resp:  [16-20] 18  BP: (111-130)/(68-95) 130/74  SpO2:  [95 %-96 %] 96 %    Weight:   [unfilled]    Review of Systems   Review of systems not obtained due to patient factors.    General Physical Exam: Patient is alert and oriented x 1. Vital signs were reviewed and are documented in EMR. Neck  "was supple.  No carotid bruit, thyromegaly, JVD or lymphadenopathy noted.  CV: Regular rhythm today.  Neurological Exam:  Mental status: Alert and attentive.   Speech: Slight dysarthria.  Speech is childlike.  He pronounces dizzy: \"ditty.\"  Cranial nerves: 2-12 appear to be intact, except I am unable to visualize his palate.  Motor: Strength is full throughout.  Sensory: Appears to be intact to light touch though patient not fully reliable.  Coordination: Finger-nose-finger is good.  Gait: Deferred.           DIAGNOSTIC STUDIES     Pertinent Radiology   Radiology Results: Personally reviewed image/s    MRI/MRA:  IMPRESSION:  Within the limitation of motion artifact:     HEAD MRI:   1.  No acute intracranial finding.  2.  Mild presumed cerebral white matter chronic microvascular ischemic change.     HEAD MRA:   1.  Normal MRA [place].     NECK MRA:  1.  Normal neck MRA.    Pertinent Labs   Lab Results: personally reviewed.   Recent Results (from the past 24 hour(s))   ECG 12-LEAD WITH MUSE ([facility])    Collection Time: 08/30/22 10:09 AM   Result Value Ref Range    Systolic Blood Pressure  mmHg    Diastolic Blood Pressure  mmHg    Ventricular Rate 71 BPM    Atrial Rate 357 BPM    MN Interval  ms    QRS Duration 90 ms     ms    QTc 397 ms    P Axis  degrees    R AXIS 41 degrees    T Axis 38 degrees    Interpretation ECG       Atrial fibrillation  Abnormal ECG  When compared with ECG of 17-DEC-2019 14:30,  No significant change was found  Confirmed by SEE ED PROVIDER NOTE FOR, ECG INTERPRETATION ([id number]),  FILEMON RUBALCAVA ([id number]) on 8/30/2022 1:24:43 PM     UA reflex to Microscopic and Culture    Collection Time: 08/30/22 10:23 PM    Specimen: Urine, Clean Catch   Result Value Ref Range    Color Urine Light Yellow Colorless, Straw, Light Yellow, Yellow    Appearance Urine Clear Clear    Glucose Urine Negative Negative mg/dL    Bilirubin Urine Negative Negative    Ketones Urine Negative Negative mg/dL    " Specific Gravity Urine 1.026 1.001 - 1.030    Blood Urine Negative Negative    pH Urine 6.5 5.0 - 7.0    Protein Albumin Urine Negative Negative mg/dL    Urobilinogen Urine <2.0 <2.0 mg/dL    Nitrite Urine Negative Negative    Leukocyte Esterase Urine Negative Negative         HOSPITAL MEDICATIONS       metoprolol tartrate  25 mg Oral BID        Total time spent for face to face visit, reviewing labs/imaging studies, counseling and coordination of care was: 45 Minutes. More than 50% of this time was spent on counseling and coordination of care.    Dragon software used in the dictation on this note.    Stephania Cobb MD  [facility] - [address], [address] [place] [address]

## 2022-08-31 NOTE — PROGRESS NOTES
Trauma Closed Head Injury    1.  Diagnostic testing complete: Yes      2.  OT TBI Screen complete and consideration of outpatient treatment plan (if applicable): No      3.  Vital signs stable: Yes      4.  Adequate pain control on analgesics: Yes      5.  Return to near baseline physical activity: No  Pt came to the unit around 1800 with his Mom. Was hungry. Ate 100% of dinner. Denied pain. Oriented to self and situation. Mom is in the room with pt and help with communicating with pt.     Arlyn Carter RN

## 2022-08-31 NOTE — PLAN OF CARE
Occupational Therapy Discharge Summary    Reason for therapy discharge:    Discharged to home with outpatient therapy.    Progress towards therapy goal(s). See goals on Care Plan in [facility] electronic health record for goal details.  Goals partially met.  Barriers to achieving goals:   discharge from facility.    Therapy recommendation(s):    Continued therapy is recommended.  Rationale/Recommendations:  to improve ability to perform ADLs and functional mobility.

## 2022-09-01 NOTE — PLAN OF CARE
Goal Outcome Evaluation:       Pt's mom declined Eliquis stating that it will mess up with the regular schedule. She stated that she will give it at around  7 pm. Primary doctor and home contact numbers changed but pt's mom stated she will need to call medical records to have other things changed.  Writer went over  discharge instruction with her; she verbalized understanding.  Discharge questions answered appropriately.Pt/mom  had no concerns at the time of discharge.CNA wheeled pt out. Pt discharged home at 1745.

## 2022-09-08 ENCOUNTER — LAB REQUISITION (OUTPATIENT)
Dept: LAB | Facility: CLINIC | Age: 52
End: 2022-09-08
Payer: MEDICARE

## 2022-09-08 ENCOUNTER — TRANSFERRED RECORDS (OUTPATIENT)
Dept: HEALTH INFORMATION MANAGEMENT | Facility: CLINIC | Age: 52
End: 2022-09-08

## 2022-09-08 DIAGNOSIS — G40.209 LOCALIZATION-RELATED (FOCAL) (PARTIAL) SYMPTOMATIC EPILEPSY AND EPILEPTIC SYNDROMES WITH COMPLEX PARTIAL SEIZURES, NOT INTRACTABLE, WITHOUT STATUS EPILEPTICUS (H): ICD-10-CM

## 2022-09-08 LAB
ANION GAP SERPL CALCULATED.3IONS-SCNC: 9 MMOL/L (ref 7–15)
BUN SERPL-MCNC: 16.1 MG/DL (ref 6–20)
CALCIUM SERPL-MCNC: 9.9 MG/DL (ref 8.6–10)
CHLORIDE SERPL-SCNC: 103 MMOL/L (ref 98–107)
CREAT SERPL-MCNC: 1.34 MG/DL (ref 0.67–1.17)
DEPRECATED HCO3 PLAS-SCNC: 31 MMOL/L (ref 22–29)
GFR SERPL CREATININE-BSD FRML MDRD: 64 ML/MIN/1.73M2
GLUCOSE SERPL-MCNC: 95 MG/DL (ref 70–99)
POTASSIUM SERPL-SCNC: 4.3 MMOL/L (ref 3.4–5.3)
SODIUM SERPL-SCNC: 143 MMOL/L (ref 136–145)

## 2022-09-08 PROCEDURE — 80048 BASIC METABOLIC PNL TOTAL CA: CPT | Mod: ORL | Performed by: FAMILY MEDICINE

## 2022-09-26 ENCOUNTER — TRANSCRIBE ORDERS (OUTPATIENT)
Dept: OTHER | Age: 52
End: 2022-09-26

## 2022-09-26 DIAGNOSIS — E55.9 VITAMIN D DEFICIENCY: ICD-10-CM

## 2022-09-26 DIAGNOSIS — M54.16 LUMBAR RADICULOPATHY: ICD-10-CM

## 2022-09-26 DIAGNOSIS — G40.309 GENERALIZED TONIC CLONIC EPILEPSY (H): ICD-10-CM

## 2022-09-26 DIAGNOSIS — G40.209 PARTIAL EPILEPSY WITH IMPAIRMENT OF CONSCIOUSNESS, NOT INTRACTABLE (H): ICD-10-CM

## 2022-09-26 DIAGNOSIS — G43.009 MIGRAINE WITHOUT AURA: ICD-10-CM

## 2022-09-26 DIAGNOSIS — G47.30 SLEEP APNEA: ICD-10-CM

## 2022-09-26 DIAGNOSIS — R13.10 DIFFICULTY SWALLOWING: Primary | ICD-10-CM

## 2022-09-26 DIAGNOSIS — Z51.81 MEDICATION MONITORING ENCOUNTER: ICD-10-CM

## 2022-09-26 DIAGNOSIS — M54.50 LOW BACK PAIN: ICD-10-CM

## 2022-09-26 DIAGNOSIS — M54.42 BILATERAL LOW BACK PAIN WITH LEFT-SIDED SCIATICA: ICD-10-CM

## 2022-09-30 ENCOUNTER — OFFICE VISIT (OUTPATIENT)
Dept: CARDIOLOGY | Facility: CLINIC | Age: 52
End: 2022-09-30
Attending: INTERNAL MEDICINE
Payer: MEDICARE

## 2022-09-30 VITALS
DIASTOLIC BLOOD PRESSURE: 70 MMHG | HEART RATE: 96 BPM | WEIGHT: 231 LBS | SYSTOLIC BLOOD PRESSURE: 122 MMHG | BODY MASS INDEX: 31.29 KG/M2 | RESPIRATION RATE: 16 BRPM | HEIGHT: 72 IN

## 2022-09-30 DIAGNOSIS — D68.51 FACTOR V LEIDEN (H): ICD-10-CM

## 2022-09-30 DIAGNOSIS — R55 SYNCOPE, UNSPECIFIED SYNCOPE TYPE: Primary | ICD-10-CM

## 2022-09-30 DIAGNOSIS — I48.20 CHRONIC ATRIAL FIBRILLATION (H): ICD-10-CM

## 2022-09-30 PROCEDURE — 99214 OFFICE O/P EST MOD 30 MIN: CPT | Performed by: INTERNAL MEDICINE

## 2022-09-30 RX ORDER — GABAPENTIN 100 MG/1
CAPSULE ORAL AT BEDTIME
COMMUNITY
Start: 2022-09-03

## 2022-09-30 NOTE — PROGRESS NOTES
HEART CARE ENCOUNTER CONSULTATON NOTE      [facility]  [phone]      Assessment/Recommendations   Assessment:   1.  History of atypical flutter s/p ablation   2.  Paroxysmal A. Fib.  Last echo was 2014.  3.  Factor V Leiden with history of DVT 2012  4.  Seizure disorder  5.  Syncope    Plan:   1.  Echocardiogram given syncope  2. Holter monitor given syncope  3.  Continue Eliquis 5 mg twice daily for A. fib and stroke prevention along with factor V Leiden and prior DVT  4.  Continue metoprolol 25 mg twice daily for rate control of A. fib  Likely cause of his fall and passing out was orthostatic hypotension.  Day before he was noting that when he was walking standing upright he his knees were giving out.  He has had no recurrence of these episodes and was given fluids in the emergency department     History of Present Illness/Subjective    HPI: Lavon Iraheta is a 52 year old male history of atrial flutter, atrial fib status post atrial flutter ablation, mental delay, seizure disorder who presents to cardiology clinic to establish care with myself.    Patient was recently hospitalized in late September with a syncopal episode.  Prior to the family that days prior he was noticing while standing his legs giving out and leaning towards the side.  This occurred on a couple occasions.  At night he was in his room when he stood up and had a fall which appears to be a syncopal episode.  Does not feel that this was a seizure based on witnessed episodes.  Came to the emergency department there he was given IV fluids.  EKG confirmed A. fib/atypical atrial flutter which was seen in 2019 as well.      Cardiology was consulted.  Reviewed Dr. Tuttle's noted.     Long conversation with the patient and his family including his mother.    Echo: pending.  In 2014 monitoring his last echocardiogram which was reviewed by report there is normal left-ventricular function.  Mild left trickle hypertrophy.    "    Physical Examination  Review of Systems   Vitals: /70 (BP Location: Right arm, Patient Position: Sitting, Cuff Size: Adult Regular)   Pulse 96   Resp 16   Ht 1.816 m (5' 11.5\")   Wt 104.8 kg (231 lb)   BMI 31.77 kg/m    BMI= Body mass index is 31.77 kg/m .  Wt Readings from Last 3 Encounters:   09/30/22 104.8 kg (231 lb)   08/29/22 102.1 kg (225 lb)   08/30/21 102.1 kg (225 lb)        Pleasant, obesity   ENT/Mouth: membranes moist, no oral lesions or bleeding gums.      EYES:  no scleral icterus, normal conjunctivae       Chest/Lungs:   lungs are clear to auscultation, no rales or wheezing,  sternal scar, equal chest wall expansion    Cardiovascular:   Irregular. Normal first and second heart sounds with no murmurs, rubs, or gallops; the carotid, radial and posterior tibial pulses are intact, Jugular venous pressure normal, no pitting edema bilaterally    Abdomen:  no tenderness; bowel sounds are present   Extremities: no cyanosis or clubbing   Skin: no xanthelasma, warm.    Neurologic: normal  bilateral, no tremors     Psychiatric: alert and oriented x3, calm        Please refer above for cardiac ROS details.        Medical History  Surgical History Family History Social History   Past Medical History:   Diagnosis Date     Cerebral palsy (H) 8/30/2022     Chronic atrial fibrillation (H) 8/30/2022     DVT (deep venous thrombosis) (H) 8/30/2022     Epilepsy (H) 8/30/2022     Factor V Leiden (H) 8/30/2022     GERD (gastroesophageal reflux disease)      Hyperlipidemia LDL goal <100 8/30/2022     Long term current use of anticoagulant therapy 8/30/2022     Mental retardation      Seizure disorder (H)      Sleep apnea 8/30/2022     Past Surgical History:   Procedure Laterality Date     ABLATION OF DYSRHYTHMIC FOCUS       AV NODE ABLATION       CATARACT EXTRACTION EXTRACAPSULAR W/ INTRAOCULAR LENS IMPLANTATION Bilateral      GALLBLADDER SURGERY       SPINAL FUSION       No family history on file.     " Social History     Socioeconomic History     Marital status: Single     Spouse name: Not on file     Number of children: Not on file     Years of education: Not on file     Highest education level: Not on file   Occupational History     Not on file   Tobacco Use     Smoking status: Never Smoker     Smokeless tobacco: Never Used   Substance and Sexual Activity     Alcohol use: Not on file     Drug use: Not on file     Sexual activity: Not on file   Other Topics Concern     Not on file   Social History Narrative    He is a resident of a group home (12/22/2014)     Social Determinants of Health     Financial Resource Strain: Not on file   Food Insecurity: Not on file   Transportation Needs: Not on file   Physical Activity: Not on file   Stress: Not on file   Social Connections: Not on file   Intimate Partner Violence: Not on file   Housing Stability: Not on file           Medications  Allergies   Current Outpatient Medications   Medication Sig Dispense Refill     ACETAMINOPHEN (TYLENOL ORAL) Take 1,000 mg by mouth every 6 hours as needed       calcium carbonate (TUMS E-X) 300 mg (750 mg) Chew Take 2 tablets by mouth 3 times daily as needed       CHOLECALCIFEROL, VITAMIN D3, (VITAMIN D3 ORAL) [CHOLECALCIFEROL, VITAMIN D3, (VITAMIN D3 ORAL)] Take 2,000 Units by mouth daily.        ELIQUIS ANTICOAGULANT 5 MG tablet TAKE 1 TABLET BY MOUTH TWICE DAILY. 60 tablet 5     gabapentin (NEURONTIN) 100 MG capsule        lamoTRIgine (LAMICTAL) 100 MG tablet Take 100 mg by mouth 2 times daily Along with a 25 mg tablet for a dose = 125 mg       lamoTRIgine (LAMICTAL) 25 MG tablet Take 25 mg by mouth 2 times daily Along with a 100 mg tablet for a dose = 125 mg       METHYLCELLULOSE (CITRUCEL ORAL) Take 2 g by mouth daily as needed       metoprolol tartrate (LOPRESSOR) 25 MG tablet Take 25 mg by mouth 2 times daily         Allergies   Allergen Reactions     Biaxin [Clarithromycin] Unknown     Codeine Unknown     Diphenhydramine Hcl  [Diphenhydramine] Unknown     Levofloxacin Unknown     Morphine Unknown          Lab Results    Chemistry/lipid CBC Cardiac Enzymes/BNP/TSH/INR   Recent Labs   Lab Test 10/25/21  1618   CHOL 194   HDL 33*      TRIG 470*     Recent Labs   Lab Test 10/25/21  1618 11/12/19  1000 10/16/18  1644    118 120     Recent Labs   Lab Test 09/08/22  1705      POTASSIUM 4.3   CHLORIDE 103   CO2 31*   GLC 95   BUN 16.1   CR 1.34*   GFRESTIMATED 64   VICENTA 9.9     Recent Labs   Lab Test 09/08/22  1705 08/29/22  2141 02/18/22  1517   CR 1.34* 1.12 1.11     No results for input(s): A1C in the last 46039 hours.       Recent Labs   Lab Test 08/29/22  2141   WBC 8.7   HGB 13.6   HCT 41.5   MCV 99   *     Recent Labs   Lab Test 08/29/22  2141 05/18/20  1605 05/23/18  1643   HGB 13.6 13.9* 13.4*    No results for input(s): TROPONINI in the last 20112 hours.  No results for input(s): BNP, NTBNPI, NTBNP in the last 61146 hours.  Recent Labs   Lab Test 03/15/22  1631   TSH 3.71     No results for input(s): INR in the last 94851 hours.     Robin Antunez DO

## 2022-09-30 NOTE — LETTER
9/30/2022    Guille Brooks MD  [address]  [facility] [address]    RE: Lavon Iraheta       Dear Colleague,     I had the pleasure of seeing Lavon Iraheta in the [facility].    HEART CARE ENCOUNTER CONSULTATON NOTE      [facility]  [phone]      Assessment/Recommendations   Assessment:   1.  History of atypical flutter s/p ablation   2.  Paroxysmal A. Fib.  Last echo was 2014.  3.  Factor V Leiden with history of DVT 2012  4.  Seizure disorder  5.  Syncope    Plan:   1.  Echocardiogram given syncope  2. Holter monitor given syncope  3.  Continue Eliquis 5 mg twice daily for A. fib and stroke prevention along with factor V Leiden and prior DVT  4.  Continue metoprolol 25 mg twice daily for rate control of A. fib  Likely cause of his fall and passing out was orthostatic hypotension.  Day before he was noting that when he was walking standing upright he his knees were giving out.  He has had no recurrence of these episodes and was given fluids in the emergency department     History of Present Illness/Subjective    HPI: Lavon Iraheta is a 52 year old male history of atrial flutter, atrial fib status post atrial flutter ablation, mental delay, seizure disorder who presents to cardiology clinic to establish care with myself.    Patient was recently hospitalized in late September with a syncopal episode.  Prior to the family that days prior he was noticing while standing his legs giving out and leaning towards the side.  This occurred on a couple occasions.  At night he was in his room when he stood up and had a fall which appears to be a syncopal episode.  Does not feel that this was a seizure based on witnessed episodes.  Came to the emergency department there he was given IV fluids.  EKG confirmed A. fib/atypical atrial flutter which was seen in 2019 as well.      Cardiology was consulted.  Reviewed Dr. Tuttle's noted.     Long conversation with the patient and his  "family including his mother.    Echo: pending.  In 2014 monitoring his last echocardiogram which was reviewed by report there is normal left-ventricular function.  Mild left trickle hypertrophy.       Physical Examination  Review of Systems   Vitals: /70 (BP Location: Right arm, Patient Position: Sitting, Cuff Size: Adult Regular)   Pulse 96   Resp 16   Ht 1.816 m (5' 11.5\")   Wt 104.8 kg (231 lb)   BMI 31.77 kg/m    BMI= Body mass index is 31.77 kg/m .  Wt Readings from Last 3 Encounters:   09/30/22 104.8 kg (231 lb)   08/29/22 102.1 kg (225 lb)   08/30/21 102.1 kg (225 lb)        Pleasant, obesity   ENT/Mouth: membranes moist, no oral lesions or bleeding gums.      EYES:  no scleral icterus, normal conjunctivae       Chest/Lungs:   lungs are clear to auscultation, no rales or wheezing,  sternal scar, equal chest wall expansion    Cardiovascular:   Irregular. Normal first and second heart sounds with no murmurs, rubs, or gallops; the carotid, radial and posterior tibial pulses are intact, Jugular venous pressure normal, no pitting edema bilaterally    Abdomen:  no tenderness; bowel sounds are present   Extremities: no cyanosis or clubbing   Skin: no xanthelasma, warm.    Neurologic: normal  bilateral, no tremors     Psychiatric: alert and oriented x3, calm        Please refer above for cardiac ROS details.        Medical History  Surgical History Family History Social History   Past Medical History:   Diagnosis Date     Cerebral palsy (H) 8/30/2022     Chronic atrial fibrillation (H) 8/30/2022     DVT (deep venous thrombosis) (H) 8/30/2022     Epilepsy (H) 8/30/2022     Factor V Leiden (H) 8/30/2022     GERD (gastroesophageal reflux disease)      Hyperlipidemia LDL goal <100 8/30/2022     Long term current use of anticoagulant therapy 8/30/2022     Mental retardation      Seizure disorder (H)      Sleep apnea 8/30/2022     Past Surgical History:   Procedure Laterality Date     ABLATION OF DYSRHYTHMIC " FOCUS       AV NODE ABLATION       CATARACT EXTRACTION EXTRACAPSULAR W/ INTRAOCULAR LENS IMPLANTATION Bilateral      GALLBLADDER SURGERY       SPINAL FUSION       No family history on file.     Social History     Socioeconomic History     Marital status: Single     Spouse name: Not on file     Number of children: Not on file     Years of education: Not on file     Highest education level: Not on file   Occupational History     Not on file   Tobacco Use     Smoking status: Never Smoker     Smokeless tobacco: Never Used   Substance and Sexual Activity     Alcohol use: Not on file     Drug use: Not on file     Sexual activity: Not on file   Other Topics Concern     Not on file   Social History Narrative    He is a resident of a group home (12/22/2014)     Social Determinants of Health     Financial Resource Strain: Not on file   Food Insecurity: Not on file   Transportation Needs: Not on file   Physical Activity: Not on file   Stress: Not on file   Social Connections: Not on file   Intimate Partner Violence: Not on file   Housing Stability: Not on file           Medications  Allergies   Current Outpatient Medications   Medication Sig Dispense Refill     ACETAMINOPHEN (TYLENOL ORAL) Take 1,000 mg by mouth every 6 hours as needed       calcium carbonate (TUMS E-X) 300 mg (750 mg) Chew Take 2 tablets by mouth 3 times daily as needed       CHOLECALCIFEROL, VITAMIN D3, (VITAMIN D3 ORAL) [CHOLECALCIFEROL, VITAMIN D3, (VITAMIN D3 ORAL)] Take 2,000 Units by mouth daily.        ELIQUIS ANTICOAGULANT 5 MG tablet TAKE 1 TABLET BY MOUTH TWICE DAILY. 60 tablet 5     gabapentin (NEURONTIN) 100 MG capsule        lamoTRIgine (LAMICTAL) 100 MG tablet Take 100 mg by mouth 2 times daily Along with a 25 mg tablet for a dose = 125 mg       lamoTRIgine (LAMICTAL) 25 MG tablet Take 25 mg by mouth 2 times daily Along with a 100 mg tablet for a dose = 125 mg       METHYLCELLULOSE (CITRUCEL ORAL) Take 2 g by mouth daily as needed        metoprolol tartrate (LOPRESSOR) 25 MG tablet Take 25 mg by mouth 2 times daily         Allergies   Allergen Reactions     Biaxin [Clarithromycin] Unknown     Codeine Unknown     Diphenhydramine Hcl [Diphenhydramine] Unknown     Levofloxacin Unknown     Morphine Unknown          Lab Results    Chemistry/lipid CBC Cardiac Enzymes/BNP/TSH/INR   Recent Labs   Lab Test 10/25/21  1618   CHOL 194   HDL 33*      TRIG 470*     Recent Labs   Lab Test 10/25/21  1618 11/12/19  1000 10/16/18  1644    118 120     Recent Labs   Lab Test 09/08/22  1705      POTASSIUM 4.3   CHLORIDE 103   CO2 31*   GLC 95   BUN 16.1   CR 1.34*   GFRESTIMATED 64   VICENTA 9.9     Recent Labs   Lab Test 09/08/22  1705 08/29/22  2141 02/18/22  1517   CR 1.34* 1.12 1.11     No results for input(s): A1C in the last 98813 hours.       Recent Labs   Lab Test 08/29/22  2141   WBC 8.7   HGB 13.6   HCT 41.5   MCV 99   *     Recent Labs   Lab Test 08/29/22  2141 05/18/20  1605 05/23/18  1643   HGB 13.6 13.9* 13.4*    No results for input(s): TROPONINI in the last 25343 hours.  No results for input(s): BNP, NTBNPI, NTBNP in the last 46425 hours.  Recent Labs   Lab Test 03/15/22  1631   TSH 3.71     No results for input(s): INR in the last 12808 hours.     Robin Antunez DO    Thank you for allowing me to participate in the care of your patient.      Sincerely,     Robin Antunez DO     [facility]    cc:   Jhonatan Tran MD  [address]  [place] [address]

## 2022-10-10 ENCOUNTER — DOCUMENTATION ONLY (OUTPATIENT)
Dept: OTHER | Facility: CLINIC | Age: 52
End: 2022-10-10

## 2022-10-27 ENCOUNTER — TRANSFERRED RECORDS (OUTPATIENT)
Dept: HEALTH INFORMATION MANAGEMENT | Facility: CLINIC | Age: 52
End: 2022-10-27

## 2022-11-01 ENCOUNTER — HOSPITAL ENCOUNTER (OUTPATIENT)
Dept: CARDIOLOGY | Facility: HOSPITAL | Age: 52
Discharge: HOME | End: 2022-11-01
Attending: INTERNAL MEDICINE
Payer: MEDICARE

## 2022-11-01 DIAGNOSIS — R55 SYNCOPE, UNSPECIFIED SYNCOPE TYPE: ICD-10-CM

## 2022-11-01 LAB — LVEF ECHO: NORMAL

## 2022-11-01 PROCEDURE — 93306 TTE W/DOPPLER COMPLETE: CPT

## 2022-11-01 PROCEDURE — 93226 XTRNL ECG REC<48 HR SCAN A/R: CPT

## 2022-11-01 PROCEDURE — 93306 TTE W/DOPPLER COMPLETE: CPT | Mod: 26 | Performed by: INTERNAL MEDICINE

## 2022-11-09 ENCOUNTER — TELEPHONE (OUTPATIENT)
Dept: CARDIOLOGY | Facility: CLINIC | Age: 52
End: 2022-11-09

## 2022-11-09 DIAGNOSIS — R55 SYNCOPE, UNSPECIFIED SYNCOPE TYPE: Primary | ICD-10-CM

## 2022-11-09 DIAGNOSIS — I48.0 PAROXYSMAL ATRIAL FIBRILLATION (H): ICD-10-CM

## 2022-11-09 NOTE — TELEPHONE ENCOUNTER
Health Call Center    Phone Message    May a detailed message be left on voicemail: no     Reason for Call: Other: Piter called to advise an order for a ZioPatch monitor was to be placed and the ZioPatch was to be mailed but he has not heard from anyone. Please reach out to Piter with the status, he can be reached at [phone].     Action Taken: Other: [facility]    Travel Screening: Not Applicable

## 2022-11-09 NOTE — TELEPHONE ENCOUNTER
===View-only below this line===  ----- Message -----  From: Robin Antunez DO  Sent: 11/9/2022   1:49 PM CST  To: Zack Ennis RN  Subject: RE: Monitor.                                     Please order a 48 hour BRODERICK with single lead. Syncope and paroxsymal atrial fib.

## 2022-11-18 ENCOUNTER — HOSPITAL ENCOUNTER (OUTPATIENT)
Dept: CARDIOLOGY | Facility: HOSPITAL | Age: 52
Discharge: HOME | End: 2022-11-18
Attending: INTERNAL MEDICINE | Admitting: INTERNAL MEDICINE
Payer: MEDICARE

## 2022-11-18 DIAGNOSIS — I48.0 PAROXYSMAL ATRIAL FIBRILLATION (H): ICD-10-CM

## 2022-11-18 DIAGNOSIS — R55 SYNCOPE, UNSPECIFIED SYNCOPE TYPE: ICD-10-CM

## 2022-11-18 PROCEDURE — 93270 REMOTE 30 DAY ECG REV/REPORT: CPT

## 2022-11-19 ENCOUNTER — TELEPHONE (OUTPATIENT)
Dept: CARDIOLOGY | Facility: CLINIC | Age: 52
End: 2022-11-19

## 2022-11-19 NOTE — TELEPHONE ENCOUNTER
Received auto-triggered alert from [employer], new AF lasted 1.5 minutes, average  bpm.    Otto Westbrook MD [facility]  Non-Invasive Cardiologist  [facility]  Pager [phone]

## 2022-11-21 ENCOUNTER — TELEPHONE (OUTPATIENT)
Dept: CARDIOLOGY | Facility: CLINIC | Age: 52
End: 2022-11-21

## 2022-11-21 NOTE — TELEPHONE ENCOUNTER
Incoming notification from [employer]. Cardiologist was notified over the weekend. Pt is on Eliquis and Metoprolol. Pt was unable to wear the holter monitor due to skin irritation. Now wearing a 2 day  Event monitor. Will route to provider as CONRAD. JANUSZ,RN

## 2022-11-21 NOTE — TELEPHONE ENCOUNTER
----- Message from MARI Gary sent at 11/21/2022  7:54 AM CST -----  Regarding: MD notify  MD notify saved for your review, thanks!

## 2022-11-28 PROCEDURE — 93228 REMOTE 30 DAY ECG REV/REPORT: CPT | Performed by: INTERNAL MEDICINE

## 2023-01-17 ENCOUNTER — HOSPITAL ENCOUNTER (EMERGENCY)
Facility: HOSPITAL | Age: 53
Discharge: HOME | End: 2023-01-17
Attending: EMERGENCY MEDICINE | Admitting: EMERGENCY MEDICINE
Payer: MEDICARE

## 2023-01-17 ENCOUNTER — APPOINTMENT (OUTPATIENT)
Dept: CT IMAGING | Facility: HOSPITAL | Age: 53
End: 2023-01-17
Attending: EMERGENCY MEDICINE
Payer: MEDICARE

## 2023-01-17 VITALS
BODY MASS INDEX: 31.77 KG/M2 | DIASTOLIC BLOOD PRESSURE: 69 MMHG | OXYGEN SATURATION: 94 % | RESPIRATION RATE: 18 BRPM | HEART RATE: 90 BPM | TEMPERATURE: 98.8 F | WEIGHT: 231 LBS | SYSTOLIC BLOOD PRESSURE: 117 MMHG

## 2023-01-17 DIAGNOSIS — R19.7 NAUSEA VOMITING AND DIARRHEA: ICD-10-CM

## 2023-01-17 DIAGNOSIS — R11.2 NAUSEA VOMITING AND DIARRHEA: ICD-10-CM

## 2023-01-17 LAB
ALBUMIN SERPL BCG-MCNC: 4.9 G/DL (ref 3.5–5.2)
ALP SERPL-CCNC: 83 U/L (ref 40–129)
ALT SERPL W P-5'-P-CCNC: 22 U/L (ref 10–50)
ANION GAP SERPL CALCULATED.3IONS-SCNC: 11 MMOL/L (ref 7–15)
AST SERPL W P-5'-P-CCNC: 22 U/L (ref 10–50)
BASOPHILS # BLD AUTO: 0 10E3/UL (ref 0–0.2)
BASOPHILS NFR BLD AUTO: 0 %
BILIRUB DIRECT SERPL-MCNC: <0.2 MG/DL (ref 0–0.3)
BILIRUB SERPL-MCNC: 0.6 MG/DL
BUN SERPL-MCNC: 18 MG/DL (ref 6–20)
C COLI+JEJUNI+LARI FUSA STL QL NAA+PROBE: NOT DETECTED
C DIFF TOX B STL QL: NEGATIVE
CALCIUM SERPL-MCNC: 9.5 MG/DL (ref 8.6–10)
CHLORIDE SERPL-SCNC: 102 MMOL/L (ref 98–107)
CREAT SERPL-MCNC: 1.11 MG/DL (ref 0.67–1.17)
DEPRECATED HCO3 PLAS-SCNC: 21 MMOL/L (ref 22–29)
EC STX1 GENE STL QL NAA+PROBE: NOT DETECTED
EC STX2 GENE STL QL NAA+PROBE: NOT DETECTED
EOSINOPHIL # BLD AUTO: 0 10E3/UL (ref 0–0.7)
EOSINOPHIL NFR BLD AUTO: 0 %
ERYTHROCYTE [DISTWIDTH] IN BLOOD BY AUTOMATED COUNT: 13.2 % (ref 10–15)
GFR SERPL CREATININE-BSD FRML MDRD: 80 ML/MIN/1.73M2
GLUCOSE SERPL-MCNC: 125 MG/DL (ref 70–99)
HCT VFR BLD AUTO: 47 % (ref 40–53)
HGB BLD-MCNC: 15.6 G/DL (ref 13.3–17.7)
IMM GRANULOCYTES # BLD: 0.1 10E3/UL
IMM GRANULOCYTES NFR BLD: 0 %
LIPASE SERPL-CCNC: 19 U/L (ref 13–60)
LYMPHOCYTES # BLD AUTO: 1.7 10E3/UL (ref 0.8–5.3)
LYMPHOCYTES NFR BLD AUTO: 15 %
MAGNESIUM SERPL-MCNC: 1.8 MG/DL (ref 1.7–2.3)
MCH RBC QN AUTO: 32 PG (ref 26.5–33)
MCHC RBC AUTO-ENTMCNC: 33.2 G/DL (ref 31.5–36.5)
MCV RBC AUTO: 97 FL (ref 78–100)
MONOCYTES # BLD AUTO: 0.7 10E3/UL (ref 0–1.3)
MONOCYTES NFR BLD AUTO: 6 %
NEUTROPHILS # BLD AUTO: 8.8 10E3/UL (ref 1.6–8.3)
NEUTROPHILS NFR BLD AUTO: 79 %
NOROV GI+II ORF1-ORF2 JNC STL QL NAA+PR: NOT DETECTED
NRBC # BLD AUTO: 0 10E3/UL
NRBC BLD AUTO-RTO: 0 /100
PLATELET # BLD AUTO: 151 10E3/UL (ref 150–450)
POTASSIUM SERPL-SCNC: 4.1 MMOL/L (ref 3.4–5.3)
PROT SERPL-MCNC: 8.3 G/DL (ref 6.4–8.3)
RBC # BLD AUTO: 4.87 10E6/UL (ref 4.4–5.9)
RVA NSP5 STL QL NAA+PROBE: NOT DETECTED
SALMONELLA SP RPOD STL QL NAA+PROBE: NOT DETECTED
SHIGELLA SP+EIEC IPAH STL QL NAA+PROBE: NOT DETECTED
SODIUM SERPL-SCNC: 134 MMOL/L (ref 136–145)
V CHOL+PARA RFBL+TRKH+TNAA STL QL NAA+PR: NOT DETECTED
WBC # BLD AUTO: 11.2 10E3/UL (ref 4–11)
Y ENTERO RECN STL QL NAA+PROBE: NOT DETECTED

## 2023-01-17 PROCEDURE — 258N000003 HC RX IP 258 OP 636: Performed by: EMERGENCY MEDICINE

## 2023-01-17 PROCEDURE — 99285 EMERGENCY DEPT VISIT HI MDM: CPT | Mod: 25

## 2023-01-17 PROCEDURE — 87506 IADNA-DNA/RNA PROBE TQ 6-11: CPT | Performed by: EMERGENCY MEDICINE

## 2023-01-17 PROCEDURE — 36415 COLL VENOUS BLD VENIPUNCTURE: CPT | Performed by: EMERGENCY MEDICINE

## 2023-01-17 PROCEDURE — 96361 HYDRATE IV INFUSION ADD-ON: CPT

## 2023-01-17 PROCEDURE — 83690 ASSAY OF LIPASE: CPT | Performed by: EMERGENCY MEDICINE

## 2023-01-17 PROCEDURE — 74177 CT ABD & PELVIS W/CONTRAST: CPT | Mod: MG

## 2023-01-17 PROCEDURE — 250N000011 HC RX IP 250 OP 636: Performed by: EMERGENCY MEDICINE

## 2023-01-17 PROCEDURE — 82248 BILIRUBIN DIRECT: CPT | Performed by: EMERGENCY MEDICINE

## 2023-01-17 PROCEDURE — 96374 THER/PROPH/DIAG INJ IV PUSH: CPT | Mod: 59

## 2023-01-17 PROCEDURE — 85004 AUTOMATED DIFF WBC COUNT: CPT | Performed by: EMERGENCY MEDICINE

## 2023-01-17 PROCEDURE — 87493 C DIFF AMPLIFIED PROBE: CPT | Performed by: EMERGENCY MEDICINE

## 2023-01-17 PROCEDURE — 80053 COMPREHEN METABOLIC PANEL: CPT | Performed by: EMERGENCY MEDICINE

## 2023-01-17 PROCEDURE — 83735 ASSAY OF MAGNESIUM: CPT | Performed by: EMERGENCY MEDICINE

## 2023-01-17 RX ORDER — ONDANSETRON 2 MG/ML
4 INJECTION INTRAMUSCULAR; INTRAVENOUS ONCE
Status: COMPLETED | OUTPATIENT
Start: 2023-01-17 | End: 2023-01-17

## 2023-01-17 RX ORDER — ONDANSETRON 4 MG/1
4 TABLET, ORALLY DISINTEGRATING ORAL EVERY 8 HOURS PRN
Qty: 15 TABLET | Refills: 0 | Status: SHIPPED | OUTPATIENT
Start: 2023-01-17

## 2023-01-17 RX ORDER — IOPAMIDOL 755 MG/ML
100 INJECTION, SOLUTION INTRAVASCULAR ONCE
Status: COMPLETED | OUTPATIENT
Start: 2023-01-17 | End: 2023-01-17

## 2023-01-17 RX ADMIN — IOPAMIDOL 100 ML: 755 INJECTION, SOLUTION INTRAVENOUS at 04:53

## 2023-01-17 RX ADMIN — SODIUM CHLORIDE 1000 ML: 9 INJECTION, SOLUTION INTRAVENOUS at 02:55

## 2023-01-17 RX ADMIN — ONDANSETRON 4 MG: 2 INJECTION INTRAMUSCULAR; INTRAVENOUS at 02:52

## 2023-01-17 RX ADMIN — SODIUM CHLORIDE 1000 ML: 9 INJECTION, SOLUTION INTRAVENOUS at 03:54

## 2023-01-17 NOTE — ED PROVIDER NOTES
EMERGENCY DEPARTMENT ENCOUNTER      NAME: Lavon Iraheta  AGE: 52 year old male  YOB: 1970  MRN: [id number]  EVALUATION DATE & TIME: No admission date for patient encounter.    PCP: Guille Brooks    ED PROVIDER: Juan Lombardi D.O.      Chief Complaint   Patient presents with     Nausea, Vomiting, & Diarrhea       FINAL IMPRESSION:  1. Nausea vomiting and diarrhea        ED COURSE & MEDICAL DECISION MAKIN:57 AM I met with the patient to gather history and to perform my initial exam. I discussed the plan for care while in the Emergency Department.  4:05 AM Nurse informed me patient is feeling better.   4:10 AM I spoke with patient's mother to gather more history.          Pertinent Labs & Imaging studies reviewed. (See chart for details)  52 year old male presents to the Emergency Department for evaluation of nausea vomiting diarrhea.  Patient has recent exposure to norovirus, and mother was concerned for the potential for food poisoning.  On exam he does have right lower quadrant tenderness.  CT scan is pending to rule out appendicitis versus other emergent process such as diverticulitis, colitis.  If CT imaging is unremarkable, the patient can be safely discharged home with Zofran.  He is tolerant to oral intake at this time, awaiting CT.  Patient care turned over to Dr. Monge for final disposition.    Medical Decision Making    History:    Supplemental history from: Family Member/Significant Other    External Record(s) reviewed: Documented in chart, if applicable.    Work Up:    Chart documentation includes differential considered and any EKGs or imaging independently interpreted by provider, where specified.    In additional to work up documented, I considered the following work up: Documented in chart, if applicable.    External consultation:    Discussion of management with another provider: Documented in chart, if applicable    Complicating factors:    Care impacted by chronic  "illness: Heart Disease and Hyperlipidemia    Care affected by social determinants of health: N/A    Disposition considerations: Admission considered. Patient was signed out to the oncoming physician, disposition pending.        At the conclusion of the encounter I discussed the results of all of the tests and the disposition. The questions were answered. The patient or family acknowledged understanding and was agreeable with the care plan.      HPI    Patient information was obtained from: Patient, Mother and Nurse  Use of : N/A        Lavon Iraheta is a 52 year old male with a history of chronic atrial fibrillation, hyperlipidemia, cerebral palsy and mental retardation who presents to the ED via EMS for the evaluation of nausea, vomiting and diarrhea.     Per nurse, the patient lives in a group home, but comes from his mother's home via EMS with symptoms of nausea, vomiting, and diarrhea. He's been at his mother's house due to a norovirus going around his group home. Mother was taking care of him when he had an episode of emesis all over her which prompted her to call EMS. She gave him 5 doses of imodium and stated that he's had 17-20 loose stools since last Sunday morning (1/15/2023).    Per patient's mother, the patient was out with 2 people from his group home last Saturday where he was potentially exposed to norovirus. The patient then started having 17-20 episodes of diarrhea along with 2 episodes of \"projectile vomiting\" over the past 2 days. During the episode of vomiting, the patient \"threw himself back and was rigid\" which prompted mom to call EMS. Patient doesn't have any fever.     Per patient, the patient endorses right facial pain along with abdominal pain, nausea and diarrhea. He denies any chest pain, lightheadedness, or pain with urination and bowel movement.     HPI limited due to patient being verbally limited.     REVIEW OF SYSTEMS  ROS  limited due to patient being verbally limited. "    Constitutional:  Denies fever, chills, weight loss or weakness  Eyes:  No pain, discharge, redness  HENT:  Denies sore throat, ear pain, congestion  Respiratory: No SOB, wheeze or cough  Cardiovascular:  No CP, palpitations  GI:  Denies abdominal pain. Positive for nausea, vomiting and diarrhea.   : Denies dysuria, hematuria  Musculoskeletal:  Denies any new muscle/joint pain, swelling or loss of function.  Skin:  Denies rash, pallor  Neurologic:  Denies headache, focal weakness or sensory changes  Lymph: Denies swollen nodes    All other systems negative unless noted in HPI.    PAST MEDICAL HISTORY:  Past Medical History:   Diagnosis Date     Cerebral palsy (H) 8/30/2022     Chronic atrial fibrillation (H) 8/30/2022     DVT (deep venous thrombosis) (H) 8/30/2022     Epilepsy (H) 8/30/2022     Factor V Leiden (H) 8/30/2022     GERD (gastroesophageal reflux disease)      Hyperlipidemia LDL goal <100 8/30/2022     Long term current use of anticoagulant therapy 8/30/2022     Mental retardation      Seizure disorder (H)      Sleep apnea 8/30/2022       PAST SURGICAL HISTORY:  Past Surgical History:   Procedure Laterality Date     ABLATION OF DYSRHYTHMIC FOCUS       AV NODE ABLATION       CATARACT EXTRACTION EXTRACAPSULAR W/ INTRAOCULAR LENS IMPLANTATION Bilateral      GALLBLADDER SURGERY       SPINAL FUSION           CURRENT MEDICATIONS:    Current Facility-Administered Medications   Medication     iopamidol (ISOVUE-370) solution 100 mL     Current Outpatient Medications   Medication     ondansetron (ZOFRAN ODT) 4 MG ODT tab     ACETAMINOPHEN (TYLENOL ORAL)     calcium carbonate (TUMS E-X) 300 mg (750 mg) Chew     CHOLECALCIFEROL, VITAMIN D3, (VITAMIN D3 ORAL)     ELIQUIS ANTICOAGULANT 5 MG tablet     gabapentin (NEURONTIN) 100 MG capsule     lamoTRIgine (LAMICTAL) 100 MG tablet     lamoTRIgine (LAMICTAL) 25 MG tablet     METHYLCELLULOSE (CITRUCEL ORAL)     metoprolol tartrate (LOPRESSOR) 25 MG tablet          ALLERGIES:  Allergies   Allergen Reactions     Biaxin [Clarithromycin] Unknown     Codeine Unknown     Diphenhydramine Hcl [Diphenhydramine] Unknown     Levofloxacin Unknown     Morphine Unknown       FAMILY HISTORY:  No family history on file.    SOCIAL HISTORY:  Social History     Socioeconomic History     Marital status: Single   Tobacco Use     Smoking status: Never     Smokeless tobacco: Never   Social History Narrative    He is a resident of a group home (12/22/2014)       VITALS:  Patient Vitals for the past 24 hrs:   BP Temp Temp src Pulse Resp SpO2 Weight   01/17/23 0124 134/77 97.6  F (36.4  C) Temporal 98 16 100 % 104.8 kg (231 lb)       PHYSICAL EXAM    VITAL SIGNS: /77 (BP Location: Right arm)   Pulse 98   Temp 97.6  F (36.4  C) (Temporal)   Resp 16   Wt 104.8 kg (231 lb)   SpO2 100%   BMI 31.77 kg/m      General Appearance: Well-appearing, well-nourished, no acute distress   Head:  Normocephalic, without obvious abnormality, atraumatic  Eyes:  PERRL, conjunctiva/corneas clear, EOM's intact,  ENT:  Lips, mucosa, and tongue normal, membranes are moist without pallor  Neck:  Normal ROM, symmetrical, trachea midline    Chest:  No tenderness or deformity, no crepitus  Cardio:  Regular rate and rhythm, no murmur, rub or gallop, 2+ pulses symmetric in all extremities  Pulm:  Clear to auscultation bilaterally, respirations unlabored,  Back:  ROM normal, no CVA tenderness, no spinal tenderness, no paraspinal tenderness  Abdomen:  Soft, no rebound or guarding. Right lower quadrant tenderness.   Musculoskeletal: Full ROM, no edema, no cyanosis, good ROM of major joints  Integument:  Warm, Dry, No erythema, No rash.    Neurologic:  Alert & oriented.  No focal deficits appreciated.  Ambulatory.  Psychiatric:  Affect normal, Judgment normal, Mood normal.      LABS  Results for orders placed or performed during the hospital encounter of 01/17/23 (from the past 24 hour(s))   CBC with platelets  differential    Narrative    The following orders were created for panel order CBC with platelets differential.  Procedure                               Abnormality         Status                     ---------                               -----------         ------                     CBC with platelets and d...[[id number]]  Abnormal            Final result                 Please view results for these tests on the individual orders.   Basic metabolic panel   Result Value Ref Range    Sodium 134 (L) 136 - 145 mmol/L    Potassium 4.1 3.4 - 5.3 mmol/L    Chloride 102 98 - 107 mmol/L    Carbon Dioxide (CO2) 21 (L) 22 - 29 mmol/L    Anion Gap 11 7 - 15 mmol/L    Urea Nitrogen 18.0 6.0 - 20.0 mg/dL    Creatinine 1.11 0.67 - 1.17 mg/dL    Calcium 9.5 8.6 - 10.0 mg/dL    Glucose 125 (H) 70 - 99 mg/dL    GFR Estimate 80 >60 mL/min/1.73m2   Lipase   Result Value Ref Range    Lipase 19 13 - 60 U/L   Magnesium   Result Value Ref Range    Magnesium 1.8 1.7 - 2.3 mg/dL   Hepatic function panel   Result Value Ref Range    Protein Total 8.3 6.4 - 8.3 g/dL    Albumin 4.9 3.5 - 5.2 g/dL    Bilirubin Total 0.6 <=1.2 mg/dL    Alkaline Phosphatase 83 40 - 129 U/L    AST 22 10 - 50 U/L    ALT 22 10 - 50 U/L    Bilirubin Direct <0.20 0.00 - 0.30 mg/dL   CBC with platelets and differential   Result Value Ref Range    WBC Count 11.2 (H) 4.0 - 11.0 10e3/uL    RBC Count 4.87 4.40 - 5.90 10e6/uL    Hemoglobin 15.6 13.3 - 17.7 g/dL    Hematocrit 47.0 40.0 - 53.0 %    MCV 97 78 - 100 fL    MCH 32.0 26.5 - 33.0 pg    MCHC 33.2 31.5 - 36.5 g/dL    RDW 13.2 10.0 - 15.0 %    Platelet Count 151 150 - 450 10e3/uL    % Neutrophils 79 %    % Lymphocytes 15 %    % Monocytes 6 %    % Eosinophils 0 %    % Basophils 0 %    % Immature Granulocytes 0 %    NRBCs per 100 WBC 0 <1 /100    Absolute Neutrophils 8.8 (H) 1.6 - 8.3 10e3/uL    Absolute Lymphocytes 1.7 0.8 - 5.3 10e3/uL    Absolute Monocytes 0.7 0.0 - 1.3 10e3/uL    Absolute Eosinophils 0.0 0.0 - 0.7  10e3/uL    Absolute Basophils 0.0 0.0 - 0.2 10e3/uL    Absolute Immature Granulocytes 0.1 <=0.4 10e3/uL    Absolute NRBCs 0.0 10e3/uL         RADIOLOGY  CT Abdomen Pelvis w Contrast    (Results Pending)          MEDICATIONS GIVEN IN THE EMERGENCY:  Medications   iopamidol (ISOVUE-370) solution 100 mL (has no administration in time range)   0.9% sodium chloride BOLUS (0 mLs Intravenous Stopped 1/17/23 0354)   ondansetron (ZOFRAN) injection 4 mg (4 mg Intravenous Given 1/17/23 0252)   0.9% sodium chloride BOLUS (1,000 mLs Intravenous New Bag 1/17/23 0354)       NEW PRESCRIPTIONS STARTED AT TODAY'S ER VISIT  New Prescriptions    ONDANSETRON (ZOFRAN ODT) 4 MG ODT TAB    Take 1 tablet (4 mg) by mouth every 8 hours as needed for nausea        I, Bart Mathews, am serving as a scribe to document services personally performed by Juan Lombardi D.O based on my observations and the provider's statements to me.  I, Juan Lombardi D.O, attest that Bart Mathews is acting in a scribe capacity, has observed my performance of the services and has documented them in accordance with my direction.     Juan Lombardi D.O.  Emergency Medicine  [facility] EMERGENCY DEPARTMENT  [address] [place] [id number]  [phone]  Dept: [phone]     Juan Lombardi DO  01/17/23 0429

## 2023-01-17 NOTE — ED NOTES
"EMERGENCY DEPARTMENT SIGN OUT NOTE        ED COURSE AND MEDICAL DECISION MAKING  Patient was signed out to me by Dr Juan Lombardi at 4:30 AM  5:30 AM Rechecked and updated patient. I discussed plans for discharge with the patient, which they were agreeable to. We discussed supportive cares at home and reasons for return to the ER including new or worsening symptoms. All questions and concerns were addressed. Patient to be discharged by RN.     In brief, Lavon Iraheta is a 52 year old male with a history of chronic atrial fibrillation, hyperlipidemia, cerebral palsy and mental retardation who initially presented nausea, vomiting and diarrhea.      Per nurse, the patient lives in a group home, but comes from his mother's home via EMS with symptoms of nausea, vomiting, and diarrhea. He's been at his mother's house due to a norovirus going around his group home. Mother was taking care of him when he had an episode of emesis all over her which prompted her to call EMS. She gave him 5 doses of imodium and stated that he's had 17-20 loose stools since last Sunday morning (1/15/2023).     Per patient's mother, the patient was out with 2 people from his group home last Saturday where he was potentially exposed to norovirus. The patient then started having 17-20 episodes of diarrhea along with 2 episodes of \"projectile vomiting\" over the past 2 days. During the episode of vomiting, the patient \"threw himself back and was rigid\" which prompted mom to call EMS. Patient doesn't have any fever.      Per patient, the patient endorses right facial pain along with abdominal pain, nausea and diarrhea. He denies any chest pain, lightheadedness, or pain with urination and bowel movement.      4:22 AM Sign out from Dr. Lombardi pending CT abd/pelvis.  Anticipate discharge back to group home if CT negative.  Dr. Lombardi has provided Rx for zofran.  Pt's parents understand plan and agreeable.  5:31 AM CT showing findings of diarrheal illness.  " Pt's parents updated. Will discharge per sign out plan. Rx for zofran by Dr. Lombardi provided.  Pt/family to follow up on stool studies they collected.  Discussed all of this with mother, answered any questions and she is agreeable to plan.    FINAL IMPRESSION    1. Nausea vomiting and diarrhea        ED MEDS  Medications   0.9% sodium chloride BOLUS (0 mLs Intravenous Stopped 1/17/23 6889)   ondansetron (ZOFRAN) injection 4 mg (4 mg Intravenous Given 1/17/23 2160)   0.9% sodium chloride BOLUS (0 mLs Intravenous Stopped 1/17/23 4372)   iopamidol (ISOVUE-370) solution 100 mL (100 mLs Intravenous Given 1/17/23 7319)       LAB  Labs Ordered and Resulted from Time of ED Arrival to Time of ED Departure   BASIC METABOLIC PANEL - Abnormal       Result Value    Sodium 134 (*)     Potassium 4.1      Chloride 102      Carbon Dioxide (CO2) 21 (*)     Anion Gap 11      Urea Nitrogen 18.0      Creatinine 1.11      Calcium 9.5      Glucose 125 (*)     GFR Estimate 80     CBC WITH PLATELETS AND DIFFERENTIAL - Abnormal    WBC Count 11.2 (*)     RBC Count 4.87      Hemoglobin 15.6      Hematocrit 47.0      MCV 97      MCH 32.0      MCHC 33.2      RDW 13.2      Platelet Count 151      % Neutrophils 79      % Lymphocytes 15      % Monocytes 6      % Eosinophils 0      % Basophils 0      % Immature Granulocytes 0      NRBCs per 100 WBC 0      Absolute Neutrophils 8.8 (*)     Absolute Lymphocytes 1.7      Absolute Monocytes 0.7      Absolute Eosinophils 0.0      Absolute Basophils 0.0      Absolute Immature Granulocytes 0.1      Absolute NRBCs 0.0     LIPASE - Normal    Lipase 19     MAGNESIUM - Normal    Magnesium 1.8     HEPATIC FUNCTION PANEL - Normal    Protein Total 8.3      Albumin 4.9      Bilirubin Total 0.6      Alkaline Phosphatase 83      AST 22      ALT 22      Bilirubin Direct <0.20     ENTERIC BACTERIA AND VIRUS PANEL BY MAYE STOOL   C. DIFFICILE TOXIN B PCR WITH REFLEX TO C. DIFFICILE ANTIGEN AND TOXINS A/B EIA        RADIOLOGY    CT Abdomen Pelvis w Contrast   Final Result   IMPRESSION:    1.  Much of the small bowel is diffusely fluid-filled with multiple air-fluid levels present. There are a few mildly dilated loops of small bowel present. Mechanical small bowel obstruction difficult to completely exclude but felt to be less likely with    no obvious obstructing cause identified. Findings are favored to be more likely due to a nonspecific small bowel enteritis. No obvious bowel wall thickening and no free air. Clinical correlation.      2.  Fluid seen in normal caliber colon suggests nonspecific diarrheal state.      3.  Cholecystectomy. No biliary dilatation.      4.  Remainder of the exam is unremarkable.                              DISCHARGE MEDS  New Prescriptions    ONDANSETRON (ZOFRAN ODT) 4 MG ODT TAB    Take 1 tablet (4 mg) by mouth every 8 hours as needed for nausea       Israel Monge, [facility] EMERGENCY DEPARTMENT  [address] [place] [id number]  [phone]          Israel Monge MD  01/17/23 0553

## 2023-01-17 NOTE — ED TRIAGE NOTES
Patient brought in by [facility] from nausea/vomiting and diarrhea for the past 24 hours. Patient has vomited up all his normal medications. Patients mom has given him 5 doses of imodium and stated he has had 17-20 loose stools since Sunday morning.

## 2023-01-27 ENCOUNTER — HOSPITAL ENCOUNTER (OUTPATIENT)
Dept: RADIOLOGY | Facility: HOSPITAL | Age: 53
Discharge: HOME | End: 2023-01-27
Attending: PSYCHIATRY & NEUROLOGY | Admitting: PSYCHIATRY & NEUROLOGY
Payer: MEDICARE

## 2023-01-27 ENCOUNTER — HOSPITAL ENCOUNTER (OUTPATIENT)
Dept: SPEECH THERAPY | Facility: HOSPITAL | Age: 53
Setting detail: THERAPIES SERIES
Discharge: HOME | End: 2023-01-27
Payer: MEDICARE

## 2023-01-27 DIAGNOSIS — R13.10 DIFFICULTY SWALLOWING: ICD-10-CM

## 2023-01-27 DIAGNOSIS — G40.209 PARTIAL EPILEPSY WITH IMPAIRMENT OF CONSCIOUSNESS, NOT INTRACTABLE (H): ICD-10-CM

## 2023-01-27 DIAGNOSIS — E55.9 VITAMIN D DEFICIENCY: ICD-10-CM

## 2023-01-27 DIAGNOSIS — G43.009 MIGRAINE WITHOUT AURA: ICD-10-CM

## 2023-01-27 DIAGNOSIS — G40.309 GENERALIZED TONIC CLONIC EPILEPSY (H): ICD-10-CM

## 2023-01-27 DIAGNOSIS — M54.50 LOW BACK PAIN: ICD-10-CM

## 2023-01-27 DIAGNOSIS — M54.16 LUMBAR RADICULOPATHY: ICD-10-CM

## 2023-01-27 DIAGNOSIS — Z51.81 MEDICATION MONITORING ENCOUNTER: ICD-10-CM

## 2023-01-27 DIAGNOSIS — G47.30 SLEEP APNEA: ICD-10-CM

## 2023-01-27 PROCEDURE — 92610 EVALUATE SWALLOWING FUNCTION: CPT | Mod: XU,GN

## 2023-01-27 PROCEDURE — 74230 X-RAY XM SWLNG FUNCJ C+: CPT

## 2023-01-27 PROCEDURE — 92611 MOTION FLUOROSCOPY/SWALLOW: CPT | Mod: GN

## 2023-01-27 RX ORDER — BARIUM SULFATE 400 MG/ML
SUSPENSION ORAL ONCE
Status: COMPLETED | OUTPATIENT
Start: 2023-01-27 | End: 2023-01-27

## 2023-01-27 RX ADMIN — BARIUM SULFATE: 400 SUSPENSION ORAL at 10:59

## 2023-01-27 RX ADMIN — BARIUM SULFATE: 400 SUSPENSION ORAL at 10:58

## 2023-01-27 NOTE — PROGRESS NOTES
Speech-Language Pathology: Video Swallow Study     01/27/23 1100   General Information   Type Of Visit Initial   Start Of Care Date 01/27/23   Referring Physician Dr. Brooks; Su Arroyo PA-C   Orders Evaluate And Treat   Medical Diagnosis Cerebral Palsy   Onset Of Illness/injury Or Date Of Surgery 01/01/23   Pertinent History of Current Problem/OT: Additional Occupational Profile Info Patient is a 52 year old male with history of CP and lives in a Group Home. His mother noted pocketing food, oral stasis, reduced chewing and concern for difficulty swallowing and risk of choking recently. Patient is reportedly on a regular diet and thin liquids at group home per staff member. No significant coughing is reported with liquids during meals.   Prior Level Of Function Comment No swallow concerns reported until the last few weeks   Living Environment Group Home   General Observations Alert and cooperative; Followed commands appropriately for exam   Patient/family Goals determine any swallow trouble and associated recommendations   General Information Comments Clinical Swallow Evaluation completed prior to VFSS via clinical interview, symptoms review, chart review and oral motor exam.   Clinical Swallow Evaluation   Oral Musculature generally intact;other (see comments)  (suspect baseline; movements c/w CP)   Structural Abnormalities none present   Mucosal Quality adequate   Mandibular Strength and Mobility other (see comments)  (mildly reduced oral opening)   Oral Labial Strength and Mobility impaired coordination;WFL  (suspect baseline)   Lingual Strength and Mobility impaired coordination;WFL  (reduced lateralization, suspect baseline and difficult to assess)   Buccal Strength and Mobility intact   VFSS Evaluation   VFSS Additional Documentation Yes   VFSS Eval: Radiology   Radiologist Dr. Murillo   Views Taken left lateral   Physical Location of Procedure [facility]   VFSS Eval: Thin Liquid Texture Trial    Mode of Presentation, Thin Liquid cup;straw;spoon;self-fed;fed by clinician   Preparatory Phase WFL  (Mildly reduced bolus control but WFL)   Oral Phase, Thin Liquid WFL   Pharyngeal Phase, Thin Liquid WFL;Delayed swallow reflex   Rosenbek's Penetration Aspiration Scale: Thin Liquid Trial Results 2 - contrast enters airway, remains above the vocal cords, no residue remains (penetration)   VFSS Eval: Mildly Thick Liquids    Mode of Presentation spoon;fed by clinician   Preparatory Phase WFL   Oral Phase WFL   Pharyngeal Phase WFL   Rosenbek's Penetration Aspiration Scale 1 - no aspiration, contrast does not enter airway   VFSS Eval: Puree Solid Texture Trial   Mode of Presentation, Puree spoon;fed by clinician   Preparatory Phase WFL   Oral Phase, Puree WFL   Pharyngeal Phase, Puree WFL   Rosenbek's Penetration Aspiration Scale: Puree Food Trial Results 1 - no aspiration, contrast does not enter airway   VFSS Eval: Regular Texture Trial (Solid)   Mode of Presentation fed by clinician   Preparatory Phase Insufficient mastication;Other (see comments)  (Reduced mastication; Patient initially held bolus in oral cavity, progressed to munching pattern with minimal rotary chew; bolus did appear masticated/reduced)   Oral Phase Impaired mastication   Pharyngeal Phase WFL   Rosenbek's Penetration Aspiration Scale 1 - no aspiration, contrast does not enter airway   Esophageal Phase of Swallow   Patient reports or presents with symptoms of esophageal dysphagia No   Swallow Eval: Clinical Impressions   Skilled Criteria for Therapy Intervention No problems identified which require skilled intervention   Diet texture recommendations Easy to Chew diet (level 7);Thin liquids (level 0)   Recommended Feeding/Eating Techniques small sips/bites  (slow rate of intake)   Rehab Potential good, to achieve stated therapy goals   Demonstrates Need for Referral to Another Service other (see comments)  (Consider assessment in patient's home  environment for further assessment of mastication and oral phase dysfunction, with meals, if symptoms/concerns persist or increase)   Risks and Benefits of Treatment have been explained. Yes  (caregiver)   Patient, family and/or staff in agreement with Plan of Care Yes   Clinical Impression Comments Video Swallow Study completed. Patient had no aspiration and inconsistent shallow, transient penetration with larger volumes of thin liquids only. Oral phase is mildly effortful with lingual pumping and delayed AP movement but WFL overall. Pharyngeal swallow function is largely WNL. Tongue base retraction is WNL. Pharyngeal constriction, hyolaryngeal elevation and excursion were all WNL. Epiglottic inversion is complete. Swallow response is timely to mildly delayed. Mastication is reduced, beginning with holding bolus then progressing to munching with some rotary chew. Prominent cervical spine projection into pharynx at C3-4 is present but no apparent negative impact on swallow function at this time; bolus passes completely. Recommend diet of Easy to Chew and Thin liquids, small bites and sips, slow rate of intake. Patient may benefit from cues to chew his food more completely, pending performance in his natural environment. If further concerns arise or symptoms worsen, consider Clinical Swallow Evaluation at his home for further assessment of mastication of typical foods he consumes. No further ST involvement necessary at this time.   Total Session Time   SLP Eval: oral/pharyngeal swallow function, clinical minutes (70183) 8   SLP Eval: VideoFluoroscopic Swallow function Minutes (59744) 10

## 2023-02-11 NOTE — PROGRESS NOTES
Care Management Follow Up    Length of Stay (days): 0    Expected Discharge Date: 08/31/2022     Concerns to be Addressed:       Patient plan of care discussed at interdisciplinary rounds: Yes    Anticipated Discharge Disposition:       Anticipated Discharge Services:    Anticipated Discharge DME:      Patient/family educated on Medicare website which has current facility and service quality ratings:    Education Provided on the Discharge Plan:    Patient/Family in Agreement with the Plan:      Referrals Placed by CM/SW:    Private pay costs discussed: Not applicable    Additional Information:  SW talked with patient and his mother in patients room. Mother had some questions about patient's discharge care. SW explained that therapy is not recommending home care but SW can ask doctor to put in order for Vestibular outpatient therapy. Patient's mother also decided she will be take patient home with her and she will let the group home know. SW provided [facility] number as mother was worried that nobody in group home heard patient fall and they sent him to work the next day.    MD will write order for Vestibular outpatient.       Renee Springer         Opt out

## 2023-03-23 ENCOUNTER — TRANSFERRED RECORDS (OUTPATIENT)
Dept: HEALTH INFORMATION MANAGEMENT | Facility: CLINIC | Age: 53
End: 2023-03-23
Payer: MEDICARE

## 2023-03-24 ENCOUNTER — OFFICE VISIT (OUTPATIENT)
Dept: CARDIOLOGY | Facility: CLINIC | Age: 53
End: 2023-03-24
Payer: MEDICARE

## 2023-03-24 VITALS
DIASTOLIC BLOOD PRESSURE: 72 MMHG | OXYGEN SATURATION: 97 % | BODY MASS INDEX: 31.42 KG/M2 | RESPIRATION RATE: 16 BRPM | SYSTOLIC BLOOD PRESSURE: 115 MMHG | HEART RATE: 69 BPM | HEIGHT: 72 IN | WEIGHT: 232 LBS

## 2023-03-24 DIAGNOSIS — I48.0 PAROXYSMAL ATRIAL FIBRILLATION (H): Primary | ICD-10-CM

## 2023-03-24 DIAGNOSIS — D68.51 FACTOR V LEIDEN (H): ICD-10-CM

## 2023-03-24 DIAGNOSIS — R55 SYNCOPE, UNSPECIFIED SYNCOPE TYPE: ICD-10-CM

## 2023-03-24 PROCEDURE — 99214 OFFICE O/P EST MOD 30 MIN: CPT | Performed by: INTERNAL MEDICINE

## 2023-03-24 NOTE — PROGRESS NOTES
HEART CARE ENCOUNTER CONSULTATON NOTE      [facility]  [phone]      Assessment/Recommendations   Assessment:   1.  History of atypical flutter s/p ablation   2.  Paroxysmal A. Fib.  Noted on recent cardiac monitor.  3.  Factor V Leiden with history of DVT 2012  4.  Seizure disorder  5.  Syncope  6.   Recurrent episodes of lightheadedness.  Standing.  Unclear if related to cardiac arrhythmias.  7.  Cerebral palsy    Plan:   1.  We will discuss with electrophysiologist regarding plan for loop monitor given recurrent syncope and near syncopal episodes  2.  Continue Eliquis twice daily for A. fib and stroke prevention along with factor V Leiden and prior DVT  3.  Continue metoprolol 25 mg twice daily for rate control of A. Fib  Increase oral hydration4.        History of Present Illness/Subjective    HPI: Lavon Iraheta is a 52 year old male history of atrial flutter, atrial fib status post atrial flutter ablation, mental delay, seizure disorder who presents to cardiology clinic in follow-up.    Since last evaluation in clinic the patient had a few episodes of severe lightheadedness without loss of consciousness.  He was wearing a cardiac monitor for few hours and was noted to be in atrial fibrillation.  Unfortunately, the patient had difficulty with wearing his cardiac monitor more than 24 hours.    At this time is not clear that cardiac arrhythmia is the cause of his symptoms.   symptoms could also be related to orthostatic hypotension as his events typically occur while he is standing and in the morning.  Advised to change dietary intake of water.  Is possible that he is dehydrated at times as well.  Recently had orthostatic blood pressure and heart rates which were normal.    Echo: Reviewed  The left ventricle is normal in size.  Left ventricular function is normal.The ejection fraction is 55-60%.  Normal right ventricle size and systolic function.  The left atrium is moderately  "dilated.  No hemodynamically significant valvular abnormalities on 2D or color flow  imaging.       Physical Examination  Review of Systems   Vitals: /72 (BP Location: Left arm, Patient Position: Sitting, Cuff Size: Adult Regular)   Pulse 69   Resp 16   Ht 1.816 m (5' 11.5\")   Wt 105.2 kg (232 lb)   SpO2 97%   BMI 31.91 kg/m    BMI= Body mass index is 31.91 kg/m .  Wt Readings from Last 3 Encounters:   03/24/23 105.2 kg (232 lb)   01/17/23 104.8 kg (231 lb)   09/30/22 104.8 kg (231 lb)        Pleasant, obesity   ENT/Mouth: membranes moist, no oral lesions or bleeding gums.      EYES:  no scleral icterus, normal conjunctivae       Chest/Lungs:   lungs are clear to auscultation, no rales or wheezing,  sternal scar, equal chest wall expansion    Cardiovascular:   Irregular. Normal first and second heart sounds with no murmurs, rubs, or gallops; the carotid, radial and posterior tibial pulses are intact, Jugular venous pressure normal, no pitting edema bilaterally.  No change   Abdomen:  no tenderness; bowel sounds are present   Extremities: no cyanosis or clubbing   Skin: no xanthelasma, warm.    Neurologic: normal  bilateral, no tremors     Psychiatric: alert and oriented x3, calm        Please refer above for cardiac ROS details.        Medical History  Surgical History Family History Social History   Past Medical History:   Diagnosis Date     Cerebral palsy (H) 8/30/2022     Chronic atrial fibrillation (H) 8/30/2022     DVT (deep venous thrombosis) (H) 8/30/2022     Epilepsy (H) 8/30/2022     Factor V Leiden (H) 8/30/2022     GERD (gastroesophageal reflux disease)      Hyperlipidemia LDL goal <100 8/30/2022     Long term current use of anticoagulant therapy 8/30/2022     Mental retardation      Seizure disorder (H)      Sleep apnea 8/30/2022     Past Surgical History:   Procedure Laterality Date     ABLATION OF DYSRHYTHMIC FOCUS       AV NODE ABLATION       CATARACT EXTRACTION EXTRACAPSULAR W/ " INTRAOCULAR LENS IMPLANTATION Bilateral      GALLBLADDER SURGERY       SPINAL FUSION       No family history on file.     Social History     Socioeconomic History     Marital status: Single     Spouse name: Not on file     Number of children: Not on file     Years of education: Not on file     Highest education level: Not on file   Occupational History     Not on file   Tobacco Use     Smoking status: Never     Smokeless tobacco: Never   Substance and Sexual Activity     Alcohol use: Not on file     Drug use: Not on file     Sexual activity: Not on file   Other Topics Concern     Not on file   Social History Narrative    He is a resident of a group home (12/22/2014)     Social Determinants of Health     Financial Resource Strain: Not on file   Food Insecurity: Not on file   Transportation Needs: Not on file   Physical Activity: Not on file   Stress: Not on file   Social Connections: Not on file   Intimate Partner Violence: Not on file   Housing Stability: Not on file           Medications  Allergies   Current Outpatient Medications   Medication Sig Dispense Refill     ACETAMINOPHEN (TYLENOL ORAL) Take 1,000 mg by mouth every 6 hours as needed       calcium carbonate (TUMS E-X) 300 mg (750 mg) Chew Take 2 tablets by mouth 3 times daily as needed       CHOLECALCIFEROL, VITAMIN D3, (VITAMIN D3 ORAL) [CHOLECALCIFEROL, VITAMIN D3, (VITAMIN D3 ORAL)] Take 2,000 Units by mouth daily.        ELIQUIS ANTICOAGULANT 5 MG tablet TAKE 1 TABLET BY MOUTH TWICE DAILY. 60 tablet 5     gabapentin (NEURONTIN) 100 MG capsule At Bedtime       lamoTRIgine (LAMICTAL) 100 MG tablet Take 100 mg by mouth 2 times daily Along with a 25 mg tablet for a dose = 125 mg       lamoTRIgine (LAMICTAL) 25 MG tablet Take 25 mg by mouth 2 times daily Along with a 100 mg tablet for a dose = 125 mg       METHYLCELLULOSE (CITRUCEL ORAL) Take 2 g by mouth daily as needed       metoprolol tartrate (LOPRESSOR) 25 MG tablet Take 25 mg by mouth 2 times daily        ondansetron (ZOFRAN ODT) 4 MG ODT tab Take 1 tablet (4 mg) by mouth every 8 hours as needed for nausea 15 tablet 0       Allergies   Allergen Reactions     Biaxin [Clarithromycin] Unknown     Codeine Unknown     Diphenhydramine Hcl [Diphenhydramine] Unknown     Levofloxacin Unknown     Morphine Unknown          Lab Results    Chemistry/lipid CBC Cardiac Enzymes/BNP/TSH/INR   Recent Labs   Lab Test 10/25/21  1618   CHOL 194   HDL 33*      TRIG 470*     Recent Labs   Lab Test 10/25/21  1618 11/12/19  1000 10/16/18  1644    118 120     Recent Labs   Lab Test 09/08/22  1705      POTASSIUM 4.3   CHLORIDE 103   CO2 31*   GLC 95   BUN 16.1   CR 1.34*   GFRESTIMATED 64   VICENTA 9.9     Recent Labs   Lab Test 09/08/22  1705 08/29/22  2141 02/18/22  1517   CR 1.34* 1.12 1.11     No results for input(s): A1C in the last 05642 hours.       Recent Labs   Lab Test 08/29/22  2141   WBC 8.7   HGB 13.6   HCT 41.5   MCV 99   *     Recent Labs   Lab Test 08/29/22  2141 05/18/20  1605 05/23/18  1643   HGB 13.6 13.9* 13.4*    No results for input(s): TROPONINI in the last 10161 hours.  No results for input(s): BNP, NTBNPI, NTBNP in the last 92515 hours.  Recent Labs   Lab Test 03/15/22  1631   TSH 3.71     No results for input(s): INR in the last 83680 hours.     Robin Antunez,             Date of service 3/24/2023

## 2023-03-24 NOTE — LETTER
3/24/2023    Guille Brooks MD  [address]  [facility] [address]    RE: Lavon Iraheta       Dear Colleague,     I had the pleasure of seeing Lavon Iraheta in the [facility].    HEART CARE ENCOUNTER CONSULTATON NOTE      [facility]  [phone]      Assessment/Recommendations   Assessment:   1.  History of atypical flutter s/p ablation   2.  Paroxysmal A. Fib.  Noted on recent cardiac monitor.  3.  Factor V Leiden with history of DVT 2012  4.  Seizure disorder  5.  Syncope  6.   Recurrent episodes of lightheadedness.  Standing.  Unclear if related to cardiac arrhythmias.  7.  Cerebral palsy    Plan:   1.  We will discuss with electrophysiologist regarding plan for loop monitor given recurrent syncope and near syncopal episodes  2.  Continue Eliquis twice daily for A. fib and stroke prevention along with factor V Leiden and prior DVT  3.  Continue metoprolol 25 mg twice daily for rate control of A. Fib  Increase oral hydration4.        History of Present Illness/Subjective    HPI: Lavon Iraheta is a 52 year old male history of atrial flutter, atrial fib status post atrial flutter ablation, mental delay, seizure disorder who presents to cardiology clinic in follow-up.    Since last evaluation in clinic the patient had a few episodes of severe lightheadedness without loss of consciousness.  He was wearing a cardiac monitor for few hours and was noted to be in atrial fibrillation.  Unfortunately, the patient had difficulty with wearing his cardiac monitor more than 24 hours.    At this time is not clear that cardiac arrhythmia is the cause of his symptoms.   symptoms could also be related to orthostatic hypotension as his events typically occur while he is standing and in the morning.  Advised to change dietary intake of water.  Is possible that he is dehydrated at times as well.  Recently had orthostatic blood pressure and heart rates which were normal.    Echo:  "Reviewed  The left ventricle is normal in size.  Left ventricular function is normal.The ejection fraction is 55-60%.  Normal right ventricle size and systolic function.  The left atrium is moderately dilated.  No hemodynamically significant valvular abnormalities on 2D or color flow  imaging.       Physical Examination  Review of Systems   Vitals: /72 (BP Location: Left arm, Patient Position: Sitting, Cuff Size: Adult Regular)   Pulse 69   Resp 16   Ht 1.816 m (5' 11.5\")   Wt 105.2 kg (232 lb)   SpO2 97%   BMI 31.91 kg/m    BMI= Body mass index is 31.91 kg/m .  Wt Readings from Last 3 Encounters:   03/24/23 105.2 kg (232 lb)   01/17/23 104.8 kg (231 lb)   09/30/22 104.8 kg (231 lb)        Pleasant, obesity   ENT/Mouth: membranes moist, no oral lesions or bleeding gums.      EYES:  no scleral icterus, normal conjunctivae       Chest/Lungs:   lungs are clear to auscultation, no rales or wheezing,  sternal scar, equal chest wall expansion    Cardiovascular:   Irregular. Normal first and second heart sounds with no murmurs, rubs, or gallops; the carotid, radial and posterior tibial pulses are intact, Jugular venous pressure normal, no pitting edema bilaterally.  No change   Abdomen:  no tenderness; bowel sounds are present   Extremities: no cyanosis or clubbing   Skin: no xanthelasma, warm.    Neurologic: normal  bilateral, no tremors     Psychiatric: alert and oriented x3, calm        Please refer above for cardiac ROS details.        Medical History  Surgical History Family History Social History   Past Medical History:   Diagnosis Date     Cerebral palsy (H) 8/30/2022     Chronic atrial fibrillation (H) 8/30/2022     DVT (deep venous thrombosis) (H) 8/30/2022     Epilepsy (H) 8/30/2022     Factor V Leiden (H) 8/30/2022     GERD (gastroesophageal reflux disease)      Hyperlipidemia LDL goal <100 8/30/2022     Long term current use of anticoagulant therapy 8/30/2022     Mental retardation      Seizure " disorder (H)      Sleep apnea 8/30/2022     Past Surgical History:   Procedure Laterality Date     ABLATION OF DYSRHYTHMIC FOCUS       AV NODE ABLATION       CATARACT EXTRACTION EXTRACAPSULAR W/ INTRAOCULAR LENS IMPLANTATION Bilateral      GALLBLADDER SURGERY       SPINAL FUSION       No family history on file.     Social History     Socioeconomic History     Marital status: Single     Spouse name: Not on file     Number of children: Not on file     Years of education: Not on file     Highest education level: Not on file   Occupational History     Not on file   Tobacco Use     Smoking status: Never     Smokeless tobacco: Never   Substance and Sexual Activity     Alcohol use: Not on file     Drug use: Not on file     Sexual activity: Not on file   Other Topics Concern     Not on file   Social History Narrative    He is a resident of a group home (12/22/2014)     Social Determinants of Health     Financial Resource Strain: Not on file   Food Insecurity: Not on file   Transportation Needs: Not on file   Physical Activity: Not on file   Stress: Not on file   Social Connections: Not on file   Intimate Partner Violence: Not on file   Housing Stability: Not on file           Medications  Allergies   Current Outpatient Medications   Medication Sig Dispense Refill     ACETAMINOPHEN (TYLENOL ORAL) Take 1,000 mg by mouth every 6 hours as needed       calcium carbonate (TUMS E-X) 300 mg (750 mg) Chew Take 2 tablets by mouth 3 times daily as needed       CHOLECALCIFEROL, VITAMIN D3, (VITAMIN D3 ORAL) [CHOLECALCIFEROL, VITAMIN D3, (VITAMIN D3 ORAL)] Take 2,000 Units by mouth daily.        ELIQUIS ANTICOAGULANT 5 MG tablet TAKE 1 TABLET BY MOUTH TWICE DAILY. 60 tablet 5     gabapentin (NEURONTIN) 100 MG capsule At Bedtime       lamoTRIgine (LAMICTAL) 100 MG tablet Take 100 mg by mouth 2 times daily Along with a 25 mg tablet for a dose = 125 mg       lamoTRIgine (LAMICTAL) 25 MG tablet Take 25 mg by mouth 2 times daily Along with a  100 mg tablet for a dose = 125 mg       METHYLCELLULOSE (CITRUCEL ORAL) Take 2 g by mouth daily as needed       metoprolol tartrate (LOPRESSOR) 25 MG tablet Take 25 mg by mouth 2 times daily       ondansetron (ZOFRAN ODT) 4 MG ODT tab Take 1 tablet (4 mg) by mouth every 8 hours as needed for nausea 15 tablet 0       Allergies   Allergen Reactions     Biaxin [Clarithromycin] Unknown     Codeine Unknown     Diphenhydramine Hcl [Diphenhydramine] Unknown     Levofloxacin Unknown     Morphine Unknown          Lab Results    Chemistry/lipid CBC Cardiac Enzymes/BNP/TSH/INR   Recent Labs   Lab Test 10/25/21  1618   CHOL 194   HDL 33*      TRIG 470*     Recent Labs   Lab Test 10/25/21  1618 11/12/19  1000 10/16/18  1644    118 120     Recent Labs   Lab Test 09/08/22  1705      POTASSIUM 4.3   CHLORIDE 103   CO2 31*   GLC 95   BUN 16.1   CR 1.34*   GFRESTIMATED 64   VICENTA 9.9     Recent Labs   Lab Test 09/08/22  1705 08/29/22  2141 02/18/22  1517   CR 1.34* 1.12 1.11     No results for input(s): A1C in the last 60226 hours.       Recent Labs   Lab Test 08/29/22  2141   WBC 8.7   HGB 13.6   HCT 41.5   MCV 99   *     Recent Labs   Lab Test 08/29/22  2141 05/18/20  1605 05/23/18  1643   HGB 13.6 13.9* 13.4*    No results for input(s): TROPONINI in the last 74792 hours.  No results for input(s): BNP, NTBNPI, NTBNP in the last 09165 hours.  Recent Labs   Lab Test 03/15/22  1631   TSH 3.71     No results for input(s): INR in the last 29154 hours.     Robin Antunez DO    Date of service 3/24/2023    Thank you for allowing me to participate in the care of your patient.      Sincerely,     Robin Antunez DO     [facility]  cc:   No referring provider defined for this encounter.

## 2023-03-24 NOTE — H&P (VIEW-ONLY)
HEART CARE ENCOUNTER CONSULTATON NOTE      [facility]  [phone]      Assessment/Recommendations   Assessment:   1.  History of atypical flutter s/p ablation   2.  Paroxysmal A. Fib.  Noted on recent cardiac monitor.  3.  Factor V Leiden with history of DVT 2012  4.  Seizure disorder  5.  Syncope  6.   Recurrent episodes of lightheadedness.  Standing.  Unclear if related to cardiac arrhythmias.  7.  Cerebral palsy    Plan:   1.  We will discuss with electrophysiologist regarding plan for loop monitor given recurrent syncope and near syncopal episodes  2.  Continue Eliquis twice daily for A. fib and stroke prevention along with factor V Leiden and prior DVT  3.  Continue metoprolol 25 mg twice daily for rate control of A. Fib  Increase oral hydration4.        History of Present Illness/Subjective    HPI: Lavon Iraheta is a 52 year old male history of atrial flutter, atrial fib status post atrial flutter ablation, mental delay, seizure disorder who presents to cardiology clinic in follow-up.    Since last evaluation in clinic the patient had a few episodes of severe lightheadedness without loss of consciousness.  He was wearing a cardiac monitor for few hours and was noted to be in atrial fibrillation.  Unfortunately, the patient had difficulty with wearing his cardiac monitor more than 24 hours.    At this time is not clear that cardiac arrhythmia is the cause of his symptoms.   symptoms could also be related to orthostatic hypotension as his events typically occur while he is standing and in the morning.  Advised to change dietary intake of water.  Is possible that he is dehydrated at times as well.  Recently had orthostatic blood pressure and heart rates which were normal.    Echo: Reviewed  The left ventricle is normal in size.  Left ventricular function is normal.The ejection fraction is 55-60%.  Normal right ventricle size and systolic function.  The left atrium is moderately  "dilated.  No hemodynamically significant valvular abnormalities on 2D or color flow  imaging.       Physical Examination  Review of Systems   Vitals: /72 (BP Location: Left arm, Patient Position: Sitting, Cuff Size: Adult Regular)   Pulse 69   Resp 16   Ht 1.816 m (5' 11.5\")   Wt 105.2 kg (232 lb)   SpO2 97%   BMI 31.91 kg/m    BMI= Body mass index is 31.91 kg/m .  Wt Readings from Last 3 Encounters:   03/24/23 105.2 kg (232 lb)   01/17/23 104.8 kg (231 lb)   09/30/22 104.8 kg (231 lb)        Pleasant, obesity   ENT/Mouth: membranes moist, no oral lesions or bleeding gums.      EYES:  no scleral icterus, normal conjunctivae       Chest/Lungs:   lungs are clear to auscultation, no rales or wheezing,  sternal scar, equal chest wall expansion    Cardiovascular:   Irregular. Normal first and second heart sounds with no murmurs, rubs, or gallops; the carotid, radial and posterior tibial pulses are intact, Jugular venous pressure normal, no pitting edema bilaterally.  No change   Abdomen:  no tenderness; bowel sounds are present   Extremities: no cyanosis or clubbing   Skin: no xanthelasma, warm.    Neurologic: normal  bilateral, no tremors     Psychiatric: alert and oriented x3, calm        Please refer above for cardiac ROS details.        Medical History  Surgical History Family History Social History   Past Medical History:   Diagnosis Date     Cerebral palsy (H) 8/30/2022     Chronic atrial fibrillation (H) 8/30/2022     DVT (deep venous thrombosis) (H) 8/30/2022     Epilepsy (H) 8/30/2022     Factor V Leiden (H) 8/30/2022     GERD (gastroesophageal reflux disease)      Hyperlipidemia LDL goal <100 8/30/2022     Long term current use of anticoagulant therapy 8/30/2022     Mental retardation      Seizure disorder (H)      Sleep apnea 8/30/2022     Past Surgical History:   Procedure Laterality Date     ABLATION OF DYSRHYTHMIC FOCUS       AV NODE ABLATION       CATARACT EXTRACTION EXTRACAPSULAR W/ " INTRAOCULAR LENS IMPLANTATION Bilateral      GALLBLADDER SURGERY       SPINAL FUSION       No family history on file.     Social History     Socioeconomic History     Marital status: Single     Spouse name: Not on file     Number of children: Not on file     Years of education: Not on file     Highest education level: Not on file   Occupational History     Not on file   Tobacco Use     Smoking status: Never     Smokeless tobacco: Never   Substance and Sexual Activity     Alcohol use: Not on file     Drug use: Not on file     Sexual activity: Not on file   Other Topics Concern     Not on file   Social History Narrative    He is a resident of a group home (12/22/2014)     Social Determinants of Health     Financial Resource Strain: Not on file   Food Insecurity: Not on file   Transportation Needs: Not on file   Physical Activity: Not on file   Stress: Not on file   Social Connections: Not on file   Intimate Partner Violence: Not on file   Housing Stability: Not on file           Medications  Allergies   Current Outpatient Medications   Medication Sig Dispense Refill     ACETAMINOPHEN (TYLENOL ORAL) Take 1,000 mg by mouth every 6 hours as needed       calcium carbonate (TUMS E-X) 300 mg (750 mg) Chew Take 2 tablets by mouth 3 times daily as needed       CHOLECALCIFEROL, VITAMIN D3, (VITAMIN D3 ORAL) [CHOLECALCIFEROL, VITAMIN D3, (VITAMIN D3 ORAL)] Take 2,000 Units by mouth daily.        ELIQUIS ANTICOAGULANT 5 MG tablet TAKE 1 TABLET BY MOUTH TWICE DAILY. 60 tablet 5     gabapentin (NEURONTIN) 100 MG capsule At Bedtime       lamoTRIgine (LAMICTAL) 100 MG tablet Take 100 mg by mouth 2 times daily Along with a 25 mg tablet for a dose = 125 mg       lamoTRIgine (LAMICTAL) 25 MG tablet Take 25 mg by mouth 2 times daily Along with a 100 mg tablet for a dose = 125 mg       METHYLCELLULOSE (CITRUCEL ORAL) Take 2 g by mouth daily as needed       metoprolol tartrate (LOPRESSOR) 25 MG tablet Take 25 mg by mouth 2 times daily        ondansetron (ZOFRAN ODT) 4 MG ODT tab Take 1 tablet (4 mg) by mouth every 8 hours as needed for nausea 15 tablet 0       Allergies   Allergen Reactions     Biaxin [Clarithromycin] Unknown     Codeine Unknown     Diphenhydramine Hcl [Diphenhydramine] Unknown     Levofloxacin Unknown     Morphine Unknown          Lab Results    Chemistry/lipid CBC Cardiac Enzymes/BNP/TSH/INR   Recent Labs   Lab Test 10/25/21  1618   CHOL 194   HDL 33*      TRIG 470*     Recent Labs   Lab Test 10/25/21  1618 11/12/19  1000 10/16/18  1644    118 120     Recent Labs   Lab Test 09/08/22  1705      POTASSIUM 4.3   CHLORIDE 103   CO2 31*   GLC 95   BUN 16.1   CR 1.34*   GFRESTIMATED 64   VICENTA 9.9     Recent Labs   Lab Test 09/08/22  1705 08/29/22  2141 02/18/22  1517   CR 1.34* 1.12 1.11     No results for input(s): A1C in the last 03890 hours.       Recent Labs   Lab Test 08/29/22  2141   WBC 8.7   HGB 13.6   HCT 41.5   MCV 99   *     Recent Labs   Lab Test 08/29/22  2141 05/18/20  1605 05/23/18  1643   HGB 13.6 13.9* 13.4*    No results for input(s): TROPONINI in the last 88961 hours.  No results for input(s): BNP, NTBNPI, NTBNP in the last 82227 hours.  Recent Labs   Lab Test 03/15/22  1631   TSH 3.71     No results for input(s): INR in the last 18647 hours.     Robin Antunez,             Date of service 3/24/2023

## 2023-03-25 PROBLEM — R55 SYNCOPE, UNSPECIFIED SYNCOPE TYPE: Status: ACTIVE | Noted: 2023-03-25

## 2023-04-03 ENCOUNTER — TELEPHONE (OUTPATIENT)
Dept: CARDIOLOGY | Facility: CLINIC | Age: 53
End: 2023-04-03
Payer: MEDICARE

## 2023-04-03 NOTE — TELEPHONE ENCOUNTER
[facility] Call Center    Phone Message    May a detailed message be left on voicemail: yes     Reason for Call: Other: Patient's mother is calling regarding a monitor or testing that was to be done and discussed at the last visit. Please call to discuss. She wanted to schedule but there was no orders.      Action Taken: Other: Cardiology    Travel Screening: Not Applicable

## 2023-04-04 ENCOUNTER — DOCUMENTATION ONLY (OUTPATIENT)
Dept: CARDIOLOGY | Facility: CLINIC | Age: 53
End: 2023-04-04
Payer: MEDICARE

## 2023-04-04 DIAGNOSIS — R55 SYNCOPE, UNSPECIFIED SYNCOPE TYPE: Primary | ICD-10-CM

## 2023-04-04 RX ORDER — FENTANYL CITRATE 50 UG/ML
25 INJECTION, SOLUTION INTRAMUSCULAR; INTRAVENOUS
Status: CANCELLED | OUTPATIENT
Start: 2023-04-10

## 2023-04-04 RX ORDER — LIDOCAINE 40 MG/G
CREAM TOPICAL
Status: CANCELLED | OUTPATIENT
Start: 2023-04-04

## 2023-04-04 NOTE — TELEPHONE ENCOUNTER
EP Team, please see recommendation for Implantable Loop Recorder to be placed per MAT and discussion with SWA. Please contact Mother Becca Bowman, to facilitate next steps. Thank you Juan Alberto Ray

## 2023-04-04 NOTE — TELEPHONE ENCOUNTER
===View-only below this line===  ----- Message -----  From: Robin Antunez DO  Sent: 4/4/2023   1:33 PM CDT  To: Zack Ennis RN    Spoke with Dr. Goss.   Patient has recurrent syncope and near syncope.  Recommend implantable loop.  Can we send an order for implantable loop.     ThanksRony

## 2023-04-04 NOTE — TELEPHONE ENCOUNTER
Abdullahi Goss MD  P [facility] Ep Support Pool - [facility]  Caller: Unspecified (Yesterday,  3:43 PM)  [employer]   Thanks   Abdullahi

## 2023-04-04 NOTE — PROGRESS NOTES
H&P  PMD: []  Received [] Card OV: [x]  Date: MAT 3/24/23 Teach  []   Orders  I [x] P  [x]  Letter []   AC: Eliquis- Continue All other AM Meds: Take All     1970  Home:[phone] (home) Cell:[phone] (mobile)  Emergency Contact: Becca Iraheta ([place]) [phone]  PCP: Guille Brooks, [phone]      Important patient information for CSC/Cath Lab staff : None    [facility] EP Cath Lab Procedure Order     Device Implant/Revision:  Procedure: New Implant  Current Device/Device Co Needed for Procedure: loop LoopMedtronic  Ordering Provider: Dr Goss  Ordering Date: 4/4/2023  Diagnosis:  Syncope  Scheduling Timeframe:  Next Available  Scheduling Restrictions: None  Scheduling Contact: Please contact pt to schedule, if you are unable to schedule date within the next 24 hours please contact pt to update on scheduling process  Scheduling Follow-up apt for NEW HIS/CRT Implants: NA  Pre-Procedural Testing needed: None  Anesthesia:  Conscious Sedation- CV RN to administer    [facility] EP Cath Lab Prep   H&P:  Compled by MAT on 3/24/23 if scheduled within 30 days, pt to schedule with PMD if procedure outside of this timeframe  Pre-op Labs: CBC, BMP, Beta HcG if appropriate, and INR if on Warfarin will be ordered AM of procedure and Review of most recent labs, WEL for procedure  T&S Pre-Procedure Review: T&S is not required for procedure  Medical Records Pertinent for Procedure:  N/A  Allergies: Reviewed allergies, no concerns regarding orders for procedure    Allergies   Allergen Reactions     Biaxin [Clarithromycin] Unknown     Codeine Unknown     Diphenhydramine Hcl [Diphenhydramine] Unknown     Levofloxacin Unknown     Morphine Unknown       Current Outpatient Medications:      ACETAMINOPHEN (TYLENOL ORAL), Take 1,000 mg by mouth every 6 hours as needed, Disp: , Rfl:      calcium carbonate (TUMS E-X) 300 mg (750 mg) Chew, Take 2 tablets by mouth 3 times daily as needed, Disp: , Rfl:      CHOLECALCIFEROL, VITAMIN  D3, (VITAMIN D3 ORAL), [CHOLECALCIFEROL, VITAMIN D3, (VITAMIN D3 ORAL)] Take 2,000 Units by mouth daily. , Disp: , Rfl:      ELIQUIS ANTICOAGULANT 5 MG tablet, TAKE 1 TABLET BY MOUTH TWICE DAILY., Disp: 60 tablet, Rfl: 5     gabapentin (NEURONTIN) 100 MG capsule, At Bedtime, Disp: , Rfl:      lamoTRIgine (LAMICTAL) 100 MG tablet, Take 100 mg by mouth 2 times daily Along with a 25 mg tablet for a dose = 125 mg, Disp: , Rfl:      lamoTRIgine (LAMICTAL) 25 MG tablet, Take 25 mg by mouth 2 times daily Along with a 100 mg tablet for a dose = 125 mg, Disp: , Rfl:      METHYLCELLULOSE (CITRUCEL ORAL), Take 2 g by mouth daily as needed, Disp: , Rfl:      metoprolol tartrate (LOPRESSOR) 25 MG tablet, Take 25 mg by mouth 2 times daily, Disp: , Rfl:      ondansetron (ZOFRAN ODT) 4 MG ODT tab, Take 1 tablet (4 mg) by mouth every 8 hours as needed for nausea, Disp: 15 tablet, Rfl: 0    Documentation Date:4/4/2023 3:27 PM  Angelica Driscoll RN

## 2023-04-06 ENCOUNTER — TELEPHONE (OUTPATIENT)
Dept: CARDIOLOGY | Facility: CLINIC | Age: 53
End: 2023-04-06
Payer: MEDICARE

## 2023-04-06 NOTE — TELEPHONE ENCOUNTER
Pre-Procedure Education    Procedure: ILR with Dr Goss on 4/10 with arrival time 6:00 am    COVID: COVID policy- if pt develops COVID like symptoms prior to procedure, he/she would need to complete an at home with a rapid antigen COVID test 1-2 days prior to your procedure date. If COVID + pt is aware the procedure will need to be rescheduled, and to contact CV scheduling as soon as possible    Pre-Op H&P: Completed- Available in Epic    Education:   Reviewed with Becca Bowman, pts mother (pt lives in group home)  Pre-Procedure Instruction: NPO after midnight pre procedure, Defined NPO with pt, Remove all jewelry and leave all valuables at home, Shower prior to arrival, Sedation plan/orders, Transportation requirements and arrangements post procedure, Post-procedure follow up process, Post-procedure restrictions/expectations, and Pre-procedure letter sent- letter tab  Risks:   Implantable Loop Recorder Insertion    <1% for each of the following:  infection, bleeding, hematoma,      <1% lead fracture or generator  malfunction.    For patients on anticoagulation, the risk of bleeding, hematoma are increased.      Medication:   Instructions regarding anticoagulants: Eliquis- To continue anticoagulation uninterrupted through their procedure  Instructions regarding antiarrhythmic medication: N/A for this type of procedure; N/A  Instructions for medication, other than anticoagulants and antiarrhythmics listed above, given to pt: to take all morning medications with small sips of water, with the exception of OTC supplements and MVI     Important patient information for staff: None    4/6/2023 10:41 AM  Demetria Alejandre RN

## 2023-04-10 ENCOUNTER — HOSPITAL ENCOUNTER (OUTPATIENT)
Facility: HOSPITAL | Age: 53
Discharge: HOME | End: 2023-04-10
Attending: INTERNAL MEDICINE | Admitting: NURSE PRACTITIONER
Payer: MEDICARE

## 2023-04-10 ENCOUNTER — TRANSFERRED RECORDS (OUTPATIENT)
Dept: HEALTH INFORMATION MANAGEMENT | Facility: CLINIC | Age: 53
End: 2023-04-10

## 2023-04-10 VITALS
RESPIRATION RATE: 17 BRPM | OXYGEN SATURATION: 97 % | HEIGHT: 72 IN | SYSTOLIC BLOOD PRESSURE: 119 MMHG | WEIGHT: 232 LBS | TEMPERATURE: 98.1 F | BODY MASS INDEX: 31.42 KG/M2 | DIASTOLIC BLOOD PRESSURE: 76 MMHG | HEART RATE: 76 BPM

## 2023-04-10 DIAGNOSIS — R55 SYNCOPE, UNSPECIFIED SYNCOPE TYPE: ICD-10-CM

## 2023-04-10 PROCEDURE — 250N000009 HC RX 250: Performed by: INTERNAL MEDICINE

## 2023-04-10 PROCEDURE — C1764 EVENT RECORDER, CARDIAC: HCPCS | Performed by: INTERNAL MEDICINE

## 2023-04-10 PROCEDURE — 33285 INSJ SUBQ CAR RHYTHM MNTR: CPT | Performed by: INTERNAL MEDICINE

## 2023-04-10 DEVICE — MONITOR CARDIAC LINQ II INSERTABLE LNQ22: Type: IMPLANTABLE DEVICE | Status: FUNCTIONAL

## 2023-04-10 RX ORDER — LIDOCAINE 40 MG/G
CREAM TOPICAL
Status: DISCONTINUED | OUTPATIENT
Start: 2023-04-10 | End: 2023-04-10 | Stop reason: HOSPADM

## 2023-04-10 RX ORDER — FENTANYL CITRATE 50 UG/ML
25 INJECTION, SOLUTION INTRAMUSCULAR; INTRAVENOUS
Status: DISCONTINUED | OUTPATIENT
Start: 2023-04-10 | End: 2023-04-10 | Stop reason: HOSPADM

## 2023-04-10 ASSESSMENT — ACTIVITIES OF DAILY LIVING (ADL)
ADLS_ACUITY_SCORE: 35
ADLS_ACUITY_SCORE: 35

## 2023-04-10 NOTE — PLAN OF CARE
Successful loop recorder insert. No questions per Mother. Pt eating and drinking. Denies pain. Follow up scheduled. All cares explained.

## 2023-04-10 NOTE — DISCHARGE INSTRUCTIONS
[facility]  Cardiac Electrophysiology  [address]  [place] [address]   Office: [phone]  Fax: [phone]     Cardiac Electrophysiology - Loop Recorder Implantation Discharge Instructions      PROCEDURE   ILR (implantable loop recorder) placement         MEDICATION INSTRUCTIONS   Continue taking your current medications, including your blood thinner.         WOUND CARE   Remove pressure dressing in 3 hours.  Leave the large overlying dressing in place until 3 days after discharge - this dressing can thereafter be removed by gently pulling off.  Underneath the large dressing will be steri-strips. DO NOT REMOVE these strips; please leave these in place until you are seen in clinic.  DO NOT COVER the site with any bandages or dressings. The site should be kept dry for 3-5 days - please avoid traditional showers or baths during this time.  Keep the site clean and dry.  No swimming, sauna, or hot tub use until incision is completely healed.         ACTIVITY   It takes approximately 1-2 weeks for your incision to heal.  During this time use extra precautions - please avoid the following:  Raising your arm over your head or stretching behind your back (no hyperflexion)  Pushing or pulling (mowing the lawn, vacuuming)  Lifting anything heavier than 10 pounds or a gallon of milk  Sudden or extreme motions  Be physically active every day.  Moving your arm is important         REMOTE MONITORING   You will receive a remote transmission station to monitor your device from home  Please set the transmitter up as soon as you get home from the hospital.  Directions are included in the box, and you may call our office or the company technical support line if you need assistance connecting your transmitter.  Please send a transmission the day after you go home  Automated transmissions will be sent every 3 months or sooner if device issues are detected.  You may manually send in  transmissions if you are having arrhythmia symptoms or think there may be a problem with your device.  Please call our office at [phone] if you send in a transmission off-schedule         WHAT TO WATCH OUT FOR   Contact our office or seek emergency care for any of the following:  Drainage, bleeding or oozing from the site  Redness, increased swelling, or warmth around the device site  Pain not treated with prescribed pain medication  Temperature greater than 100.4F  Chest pain, shortness of breath, dizziness/fainting         FOLLOW-UP   Our office will coordinate a follow-up visit visit in clinic for a device interrogation and an incision check 7-14 days after your procedure.   Please contact us at [phone] any issues during your recovery.

## 2023-04-10 NOTE — Clinical Note
Prepped: subxiphoid process. Prepped with: ChloraPrep. The patient was draped. .Pre-procedure site marking:Insertion site not predetermined

## 2023-04-10 NOTE — INTERVAL H&P NOTE
"I have reviewed the surgical (or preoperative) H&P that is linked to this encounter, and examined the patient. There are no significant changes    Clinical Conditions Present on Arrival:  Clinically Significant Risk Factors Present on Admission                  # Drug Induced Coagulation Defect: home medication list includes an anticoagulant medication   # Obesity: Estimated body mass index is 31.91 kg/m  as calculated from the following:    Height as of this encounter: 1.816 m (5' 11.5\").    Weight as of this encounter: 105.2 kg (232 lb).     Patient seen preprocedure, agree with above.    "

## 2023-04-10 NOTE — PRE-PROCEDURE
GENERAL PRE-PROCEDURE:   Procedure:  ILR implant for indication of recurrent syncope  Date/Time:  4/10/2023 6:50 AM    Written consent obtained?: Yes    Risks and benefits: Risks, benefits and alternatives were discussed    Consent given by:  Patient  Patient states understanding of procedure being performed: Yes    Patient's understanding of procedure matches consent: Yes    Procedure consent matches procedure scheduled: Yes    : Local only.  Appropriately NPO:  Yes  ASA Class:  3 (recurrent syncope, AFL; s/p ablation, PAF, factor V leiden, seizure disorder, cerebral palsy, Class I obesity; BMI 31.91kgm2)  Mallampati  :  Grade 3- soft palate visible, posterior pharyngeal wall not visible  Lungs:  Lungs clear with good breath sounds bilaterally  Heart:  A-fib  History & Physical reviewed:  History and physical reviewed and no updates needed  Statement of review:  I have reviewed the lab findings, diagnostic data, medications, and the plan for sedation

## 2023-04-10 NOTE — PROCEDURES
[facility]    Procedure: EP Device    Date/Time: 4/10/2023 7:50 AM    Performed by: Felisha Oneal APRN CNP  Authorized by: Abdullahi Goss MD      UNIVERSAL PROTOCOL   Site Marked: Yes  Prior Images Obtained and Reviewed:  Yes  Required items: Required blood products, implants, devices and special equipment available    Patient identity confirmed:  Verbally with patient, arm band and provided demographic data  Patient was reevaluated immediately before administering moderate or deep sedation or anesthesia  Confirmation Checklist:  Patient's identity using two indicators, relevant allergies, procedure was appropriate and matched the consent or emergent situation and correct equipment/implants were available  Time out: Immediately prior to the procedure a time out was called    Universal Protocol: the Joint Commission Universal Protocol was followed    Preparation: Patient was prepped and draped in usual sterile fashion       ANESTHESIA    Anesthesia: Local infiltration  Local Anesthetic:  Lidocaine 1% without epinephrine      SEDATION    Patient Sedated: No      PROCEDURE  Describe Procedure:   [facility]  Cardiac Electrophysiology  [address]  [place] [address]   Office: [phone]  Fax: [phone]      [facility]  Cardiac Electrophysiology    Cardiac Electrophysiology - Procedure note: Loop recorder implantation      Patient: Lavon Iraheta   : 1970     : Felisha Oneal CNP under direct supervision of Dr. Abdullahi Goss  Date: 4/10/2023    Procedures performed:  1. ILR implantation    Recommendations:  1. [place] [facility] [place]  2. Anticipate discharge this morning if site check nominal with overall clinical stability  3. Clinic visit in approximately 7-14 days  4. Follow-up clinic visit in 3-4 months    Indications:  Recurrent syncope    Patient profile:  Lavon Iraheta is a 52 year old male with a  history of atrial fibrillation and flutter status post atrial flutter ablation, mental delay, seizure disorder, cerebral palsy, factor V Leiden with history of DVT in 2012, and recurrent episodes of syncope and lightheadedness.  He has ongoing episodes of severe lightheadedness as well as possible syncope.  Cardiac monitor was unremarkable, but was only worn for a short period of time due to difficulty wearing it.  It is unknown whether his symptoms are due to cardiac arrhythmia, so he is undergoing implantable loop recorder for further evaluation.  He is on Eliquis for stroke prophylaxis.    Procedure note:  Lavon Iraheta presented in a post absorptive state [place] [facility] [place].  He was accompanied by his mother, legal guardian.  After verification of informed consent and time-out procedure, the anterior chest was prepped and draped in the typical sterile manner.     The left upper parasternal region was anesthetized with lidocaine.  The incision tool was utilized to create a 1cm horizontal incision, and the ILR implanted using the included implantation tool.  The overlying skin was closed with steri-strips after hemostasis ensured.  The procedure was well tolerated and there were no complications.      Device implant details:  ILR - Medtronic Linq II ([id number])      Felisha Oneal CNP  4/10/2023  7:51 AM  Patient Tolerance:  Patient tolerated the procedure well with no immediate complications  Length of time physician/provider present for 1:1 monitoring during sedation: 0

## 2023-04-13 DIAGNOSIS — Z95.818 IMPLANTABLE LOOP RECORDER PRESENT: ICD-10-CM

## 2023-04-13 DIAGNOSIS — I48.0 PAROXYSMAL ATRIAL FIBRILLATION (H): ICD-10-CM

## 2023-04-13 DIAGNOSIS — R55 SYNCOPE, UNSPECIFIED SYNCOPE TYPE: Primary | ICD-10-CM

## 2023-04-13 NOTE — DISCHARGE INSTRUCTIONS
Implantable Loop Recorder (ILR) Post-operative Checklist    The Device Registered Nurse (RN) reviewed the device function.    The Device RN did a wound assessment and wound care teaching.    Please call the Device Nurses with any signs of infection or questions regarding wound healing. Device Nurse Line: [phone], Option #3.    The Device RN demonstrated and displayed the specific remote monitoring system for your device.    The Device RN reviewed the Partnership Agreement Form.    Patient Instructions  You were provided with a device identification (ID) in the hospital. Please carry it with you.     You may travel by any mode of transportation; just show your device ID card.     For any surgery, let your doctor know you have an implantable loop recorder.     Your device is MRI safe     Please call the Device Clinic after each time you use your Patient Symptom Activator.           Device Clinic Contact Information  Device Nurses: [phone], Option #3.  Device Remote Specialists: [phone], Option #2. For questions about your Remote Transmission or Transmission Schedule  Device Schedulers: [phone], Option #1

## 2023-04-17 ENCOUNTER — ANCILLARY PROCEDURE (OUTPATIENT)
Dept: CARDIOLOGY | Facility: CLINIC | Age: 53
End: 2023-04-17
Attending: INTERNAL MEDICINE
Payer: MEDICARE

## 2023-04-17 DIAGNOSIS — Z95.818 IMPLANTABLE LOOP RECORDER PRESENT: ICD-10-CM

## 2023-04-17 PROCEDURE — 93291 INTERROG DEV EVAL SCRMS IP: CPT | Performed by: INTERNAL MEDICINE

## 2023-04-18 LAB
MDC_IDC_PG_IMPLANT_DTM: NORMAL
MDC_IDC_PG_MFG: NORMAL
MDC_IDC_PG_MODEL: NORMAL
MDC_IDC_PG_SERIAL: NORMAL
MDC_IDC_PG_TYPE: NORMAL
MDC_IDC_SESS_CLINIC_NAME: NORMAL
MDC_IDC_SESS_DTM: NORMAL
MDC_IDC_SESS_TYPE: NORMAL

## 2023-05-17 ENCOUNTER — APPOINTMENT (OUTPATIENT)
Dept: CT IMAGING | Facility: HOSPITAL | Age: 53
End: 2023-05-17
Attending: EMERGENCY MEDICINE
Payer: MEDICARE

## 2023-05-17 ENCOUNTER — HOSPITAL ENCOUNTER (EMERGENCY)
Facility: HOSPITAL | Age: 53
Discharge: HOME | End: 2023-05-17
Attending: EMERGENCY MEDICINE | Admitting: EMERGENCY MEDICINE
Payer: MEDICARE

## 2023-05-17 ENCOUNTER — TELEPHONE (OUTPATIENT)
Dept: CARDIOLOGY | Facility: CLINIC | Age: 53
End: 2023-05-17

## 2023-05-17 VITALS
DIASTOLIC BLOOD PRESSURE: 83 MMHG | TEMPERATURE: 98 F | OXYGEN SATURATION: 96 % | HEIGHT: 72 IN | SYSTOLIC BLOOD PRESSURE: 120 MMHG | WEIGHT: 233.1 LBS | HEART RATE: 81 BPM | BODY MASS INDEX: 31.57 KG/M2 | RESPIRATION RATE: 16 BRPM
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
## 2023-05-17 DIAGNOSIS — S40.011A CONTUSION OF RIGHT SHOULDER, INITIAL ENCOUNTER: ICD-10-CM

## 2023-05-17 DIAGNOSIS — S50.00XA CONTUSION OF ELBOW, UNSPECIFIED LATERALITY, INITIAL ENCOUNTER: ICD-10-CM

## 2023-05-17 LAB — INR PPP: 1.24 (ref 0.85–1.15)

## 2023-05-17 PROCEDURE — 36415 COLL VENOUS BLD VENIPUNCTURE: CPT | Performed by: EMERGENCY MEDICINE

## 2023-05-17 PROCEDURE — 85610 PROTHROMBIN TIME: CPT | Performed by: EMERGENCY MEDICINE

## 2023-05-17 PROCEDURE — 99284 EMERGENCY DEPT VISIT MOD MDM: CPT | Mod: 25

## 2023-05-17 PROCEDURE — G1010 CDSM STANSON: HCPCS

## 2023-05-17 ASSESSMENT — ACTIVITIES OF DAILY LIVING (ADL)
ADLS_ACUITY_SCORE: 35
ADLS_ACUITY_SCORE: 35

## 2023-05-17 NOTE — ED TRIAGE NOTES
Pt arrives via private car with group home staff. Pt was in restroom and staff heard a noise. Pt found in bathtub with feet out and shower curtain on top of him. Pt is on eliquis. Pt c/o right shoulder and elbow pain but is able to move arm around. Staff assumes pt struck head when he fell. No LOC.

## 2023-05-17 NOTE — TELEPHONE ENCOUNTER
PC to number on file.  where resident resides. Informed Group Home, to give a message to acknowledge the call in and he is in the right place, ER for assessment.  will relay the message. CMM,Rn     EHR shows ER presentation. Will route to EP and Device pool to see if ILR can be checked to see if fall was a rhythm disturbance. CMM,Rn

## 2023-05-17 NOTE — TELEPHONE ENCOUNTER
[facility] Call Center    Phone Message    May a detailed message be left on voicemail: yes     Reason for Call: Other: Pt is calling to report that he had a fall.      Action Taken: Message routed to:  Other: Cardiology    Travel Screening: Not Applicable       Thank you!  [facility]

## 2023-05-17 NOTE — ED PROVIDER NOTES
EMERGENCY DEPARTMENT ENCOUNTER      NAME: Lavon Iraheta  AGE: 53 year old male  YOB: 1970  MRN: [id number]  EVALUATION DATE & TIME: 5/17/2023  2:30 PM    PCP: Guille Brooks    ED PROVIDER: Taqueria Strickland M.D.      Chief Complaint   Patient presents with     Fall     Head Injury         FINAL IMPRESSION:  Fall  Right shoulder contusion  Right elbow contusion      ED COURSE & MEDICAL DECISION MAKING:    Pertinent Labs & Imaging studies reviewed. (See chart for details)  53 year old male presents to the Emergency Department for evaluation of potential injuries after a fall.  Patient resides in a group home due to profound developmental delay.  Caregivers heard a fall in the bathroom and discovered him in the bathtub with his feet hanging out.  Patient denies any injuries.  He is alert and pleasant.  Caregiver who accompanies him reports an abrasion to the right shoulder blade area.  Patient does have a linear erythematous abrasion measuring perhaps 4 cm in length.  However is nontender.  Good range of motion of shoulder bilaterally.  Good range of motion of lower extremities.  No chest wall tenderness.  Given patient's chronic anticoagulation patient made a trauma alert.  CT imaging the head and neck being performed out of caution.  Presently no indications for laboratory evaluation as patient had no pre-existing symptoms.  No vomiting or diarrhea to suggest dehydration.  Patient appears non toxic with stable vitals signs. Overall exam is benign.        2:44 PM  I met with the patient for the initial interview and physical examination. Discussed plan for treatment and workup in the ED.    3:55 PM.  CT of the head and neck is unremarkable.  Mother has arrived.  She is very concerned about patient's reports of elbow discomfort.  CT results shared with patient and his mother.  She now reports he does not seem to be indicating any elbow discomfort.  However she reports her son had stated he was dizzy  prior to the fall.  He does have a loop recorder in place.  We will have this interrogated.  INR pending  5:17 PM.  Data transfer completed.  Awaiting report from monitoring company.  INR subtherapeutic at 1.24  6:16 PM.  [employer] report with evidence of atrial flutter however peak rate of 103 today 1:04 AM.  No evidence of significant tacky arrhythmia of consequence.  We will proceed with plans for discharge.  At the conclusion of the encounter I discussed the results of all of the tests and the disposition. The questions were answered and return precautions provided. The patient or family acknowledged understanding and was agreeable with the care plan.            Medical Decision Making    History:    Supplemental history from: Documented in chart, if applicable    External Record(s) reviewed: Documented in chart, if applicable.    Work Up:    Chart documentation includes differential considered and any EKGs or imaging independently interpreted by provider, where specified.    In additional to work up documented, I considered the following work up: Documented in chart, if applicable.    External consultation:    Discussion of management with another provider: Documented in chart, if applicable    Complicating factors:    Care impacted by chronic illness: Heart Disease, Hyperlipidemia and Other: Factor V Leiden, Epilepsy, Seizure Disorder, Cerebral Palsy    Care affected by social determinants of health: Literacy    Disposition considerations: Discharge. No recommendations on prescription strength medication(s). See documentation for any additional details.          MEDICATIONS GIVEN IN THE EMERGENCY:  Medications - No data to display    NEW PRESCRIPTIONS STARTED AT TODAY'S ER VISIT  New Prescriptions    No medications on file          =================================================================    HPI    Patient information was obtained from: ED Nurse, Patient, and Patient's Group Home Staff Member    Use of  Intrepreter: N/A       Lavon Iraheta is a 53 year old male with a pertient medical history of mental retardation, factor V Leiden, seizure disorder, epilepsy, chronic atrial fibrillation, HLD, cerebral palsy, DVT, who presents to the ED for evaluation of a fall.    Per Nurses Note Ppatient arrives via private car with group home staff. Patient was in restroom and staff heard a noise. Patient was then found in bathtub with feet out and shower curtain on top of him. Patient is on Eliquis. Patient c/o right shoulder and elbow pain, but is able to move arm around. Staff assumes patient struck head when he fell. No LOC.     Patient reports that he fell, but he denies having any pains.    Patient group home staff reports that following the fall the patient developed an abrasion to his right shoulder blade. She endorses the patient regularly taking Eliquis, but she denies knowing why.       REVIEW OF SYSTEMS   Constitutional:  Denies fever, chills  Respiratory:  Denies productive cough or increased work of breathing  Cardiovascular:  Denies chest pain, palpitations  GI:  Denies abdominal pain, nausea, vomiting, or change in bowel or bladder habits   Musculoskeletal:  Denies any new muscle/joint swelling  Skin:  Positive for wound (right shoulder blade abrasion). Denies rash   Neurologic:  Denies focal weakness  All systems negative except as marked.     PAST MEDICAL HISTORY:  Past Medical History:   Diagnosis Date     Cerebral palsy (H) 8/30/2022     Chronic atrial fibrillation (H) 8/30/2022     DVT (deep venous thrombosis) (H) 8/30/2022     Epilepsy (H) 8/30/2022     Factor V Leiden (H) 8/30/2022     GERD (gastroesophageal reflux disease)      Hyperlipidemia LDL goal <100 8/30/2022     Long term current use of anticoagulant therapy 8/30/2022     Mental retardation      Seizure disorder (H)      Sleep apnea 8/30/2022       PAST SURGICAL HISTORY:  Past Surgical History:   Procedure Laterality Date     ABLATION OF  DYSRHYTHMIC FOCUS       AV NODE ABLATION       CATARACT EXTRACTION EXTRACAPSULAR W/ INTRAOCULAR LENS IMPLANTATION Bilateral      EP LOOP RECORDER IMPLANT N/A 4/10/2023    Procedure: Loop Recorder Implant;  Surgeon: Abdullahi Goss MD;  Location: [facility]     GALLBLADDER SURGERY       SPINAL FUSION           CURRENT MEDICATIONS:    No current facility-administered medications for this encounter.    Current Outpatient Medications:      ACETAMINOPHEN (TYLENOL ORAL), Take 1,000 mg by mouth every 6 hours as needed, Disp: , Rfl:      calcium carbonate (TUMS E-X) 300 mg (750 mg) Chew, Take 2 tablets by mouth 3 times daily as needed, Disp: , Rfl:      CHOLECALCIFEROL, VITAMIN D3, (VITAMIN D3 ORAL), [CHOLECALCIFEROL, VITAMIN D3, (VITAMIN D3 ORAL)] Take 2,000 Units by mouth daily. , Disp: , Rfl:      ELIQUIS ANTICOAGULANT 5 MG tablet, TAKE 1 TABLET BY MOUTH TWICE DAILY., Disp: 60 tablet, Rfl: 5     gabapentin (NEURONTIN) 100 MG capsule, At Bedtime, Disp: , Rfl:      lamoTRIgine (LAMICTAL) 100 MG tablet, Take 100 mg by mouth 2 times daily Along with a 25 mg tablet for a dose = 125 mg, Disp: , Rfl:      lamoTRIgine (LAMICTAL) 25 MG tablet, Take 25 mg by mouth 2 times daily Along with a 100 mg tablet for a dose = 125 mg, Disp: , Rfl:      METHYLCELLULOSE (CITRUCEL ORAL), Take 2 g by mouth daily as needed, Disp: , Rfl:      metoprolol tartrate (LOPRESSOR) 25 MG tablet, Take 25 mg by mouth 2 times daily, Disp: , Rfl:      ondansetron (ZOFRAN ODT) 4 MG ODT tab, Take 1 tablet (4 mg) by mouth every 8 hours as needed for nausea, Disp: 15 tablet, Rfl: 0    ALLERGIES:  Allergies   Allergen Reactions     Biaxin [Clarithromycin] Unknown     Codeine Unknown     Diphenhydramine Hcl [Diphenhydramine] Unknown     Levofloxacin Unknown     Morphine Unknown       FAMILY HISTORY:  History reviewed. No pertinent family history.    SOCIAL HISTORY:   Social History     Socioeconomic History     Marital status: Single     Spouse name: None  "    Number of children: None     Years of education: None     Highest education level: None   Tobacco Use     Smoking status: Never     Smokeless tobacco: Never   Social History Narrative    He is a resident of a group home (12/22/2014)       VITALS:  Patient Vitals for the past 24 hrs:   BP Temp Temp src Pulse Resp SpO2 Height Weight   05/17/23 1714 120/83 -- -- 81 -- 96 % -- --   05/17/23 1538 113/75 -- -- 83 -- 97 % -- --   05/17/23 1447 -- 98  F (36.7  C) Oral -- -- -- -- --   05/17/23 1438 104/66 -- -- 94 -- 95 % -- --   05/17/23 1426 131/74 97.5  F (36.4  C) Temporal 87 16 97 % 1.816 m (5' 11.5\") 105.7 kg (233 lb 1.6 oz)        PHYSICAL EXAM    Constitutional:  Awake, alert, in no apparent distress  HENT:  Normocephalic, Atraumatic with no obvious lumps or bumps. Bilateral external ears normal. Oropharynx moist. Nose normal. Neck- Normal range of motion with no guarding, No midline cervical tenderness, Supple, No stridor.   Eyes:  PERRL, EOMI with no signs of entrapment, Conjunctiva normal, No discharge.   Respiratory:  Normal breath sounds, No respiratory distress, No wheezing.    Cardiovascular:  Normal heart rate, Normal rhythm, No appreciable rubs or gallops.   GI:  Soft, No tenderness, No distension, No palpable masses  Musculoskeletal:  Intact distal pulses, No edema. Good range of motion in all major joints. No tenderness to palpation or major deformities noted.  Integument:  Linear abrasion over right superolateral shoulder blade.Warm, Dry, No erythema, No rash.   Neurologic:  Alert & oriented, Normal motor function, Normal sensory function, No focal deficits noted.   Psychiatric:  Affect normal, Judgment normal, Mood normal.     LAB:  All pertinent labs reviewed and interpreted.  Results for orders placed or performed during the hospital encounter of 05/17/23   Head CT w/o contrast    Impression    IMPRESSION:  HEAD CT:  1.  No acute intracranial process.    CERVICAL SPINE CT:  1.  No CT evidence for " acute fracture or post traumatic subluxation.   Cervical spine CT w/o contrast    Impression    IMPRESSION:  HEAD CT:  1.  No acute intracranial process.    CERVICAL SPINE CT:  1.  No CT evidence for acute fracture or post traumatic subluxation.   Result Value Ref Range    INR 1.24 (H) 0.85 - 1.15       RADIOLOGY:  Reviewed all pertinent imaging. Please see official radiology report.  Head CT w/o contrast   Final Result   IMPRESSION:   HEAD CT:   1.  No acute intracranial process.      CERVICAL SPINE CT:   1.  No CT evidence for acute fracture or post traumatic subluxation.      Cervical spine CT w/o contrast   Final Result   IMPRESSION:   HEAD CT:   1.  No acute intracranial process.      CERVICAL SPINE CT:   1.  No CT evidence for acute fracture or post traumatic subluxation.            I, Guille Ryan, am serving as a scribe to document services personally performed by Taqueria Stirckland MD, based on my observation and the provider's statements to me. I, Taqueria Strickland MD attest that Guille Ryan is acting in a scribe capacity, has observed my performance of the services and has documented them in accordance with my direction.    Taqueria Strickland M.D.  Emergency Medicine  [facility] EMERGENCY DEPARTMENT       Taqueria Strickland MD  05/17/23 6630

## 2023-05-17 NOTE — TELEPHONE ENCOUNTER
EP/Device Team can we check the ILR for any issues today? Pt sustained fall. Documentation in ER unable to determine if it was a mechanical fall or?  Thank you JANUSZ,Rn

## 2023-05-18 ENCOUNTER — TELEPHONE (OUTPATIENT)
Dept: CARDIOLOGY | Facility: CLINIC | Age: 53
End: 2023-05-18
Payer: MEDICARE

## 2023-05-18 NOTE — NURSING NOTE
Dr. Antunez, please review.  ILR interrogation. Pt in ER for fall. NO dysrhythmias found. Any recommendations? CMM,RN

## 2023-05-18 NOTE — TELEPHONE ENCOUNTER
===View-only below this line===  ----- Message -----  From: Asiya Belcher RN  Sent: 5/18/2023   8:27 AM CDT  To: Zack Ennis RN; *    Ok, I am working on his ILR latest information and will put a phone note in. He is in Afib with Vrates 130-200 bpm--continuous since 4/10/23.  Asiya    ILR report provided, and routed to [facility]. CMM,RN

## 2023-05-18 NOTE — TELEPHONE ENCOUNTER
5/18/23: ILR report shows known continuous Afib since 4/10/23 with heart rates up to 130-200 bpm, not sustained, no tachycardia alerts have triggered.     Encounter type: Routine remote ILR check due to   Implanted on: 4/10/2023     For Dx: AFib, Syncope  Symptom: Fall 5/17/2023  Tachy: 0  Pause: 0  Cal: 0  Time in AT/AF: 96% of the time since 4/10/2023. Patient is on Eliquis.  Heart rates: , primarily  bpm.  Plan: Next remote is scheduled for 7/25/2023.  Asiya Belcher, Device RN

## 2023-05-19 ENCOUNTER — TRANSFERRED RECORDS (OUTPATIENT)
Dept: HEALTH INFORMATION MANAGEMENT | Facility: CLINIC | Age: 53
End: 2023-05-19

## 2023-06-26 ENCOUNTER — TELEPHONE (OUTPATIENT)
Dept: CARDIOLOGY | Facility: CLINIC | Age: 53
End: 2023-06-26
Payer: MEDICARE

## 2023-06-26 NOTE — TELEPHONE ENCOUNTER
Return call to mom / co-guardian.    Mom has concerns - co-guardian dad would like to take pt to [facility] in [facility] for 4 days.  Mom said dad has not cared for pt alone in decades, and she feels he is doing it for show, and she is frightened.  She said she has depression / anxiety issues and this is doing nothing for her heart.  She went on to say that dad took pt out 5/9 for birthday dinner and hasn't called since.      Pt lives in group home, and has had a couple of falls recently.  He has a loop recorder and mom is concerned if pt will have to take remote transmission station with.  Mom said healthcare is not good up in that area and if something happened they would have to go to [place].    Suggested mom discuss with a , as we can not dictate what a legal guardian can and can't do.  She said she has two  and have tried to get some people removed as guardians, but have been unsuccessful.    Dr Antunez - mom would like you to say for medical reasons, it is not safe for pt to travel with his father to [place] for 4 days.  -cassius

## 2023-06-26 NOTE — TELEPHONE ENCOUNTER
Recommendations discussed with mom.  Mom verbalized understanding and had no questions.  -rah    ===View-only below this line===  ----- Message -----  From: Robin Antunez DO  Sent: 6/26/2023   2:18 PM CDT  To: Liz Slaughter RN; Zack Ennis RN    He has atrial flutter and adequately managed. For this type of cardiac situation there are no restriction on travel from a cardiovascular standpoint.

## 2023-06-26 NOTE — TELEPHONE ENCOUNTER
[facility] Call Center    Phone Message    May a detailed message be left on voicemail: yes     Reason for Call: Other:     Pts mom is requesting a call back to discuss the a trip pt has planned to the [facility] and if it is safe.    Action Taken: Other: cardio    Travel Screening: Not Applicable     Thank you!  [facility]

## 2023-06-27 ENCOUNTER — OFFICE VISIT (OUTPATIENT)
Dept: CARDIOLOGY | Facility: CLINIC | Age: 53
End: 2023-06-27
Payer: MEDICARE

## 2023-06-27 VITALS
SYSTOLIC BLOOD PRESSURE: 108 MMHG | DIASTOLIC BLOOD PRESSURE: 70 MMHG | RESPIRATION RATE: 16 BRPM | HEART RATE: 72 BPM | WEIGHT: 232 LBS | BODY MASS INDEX: 31.91 KG/M2

## 2023-06-27 DIAGNOSIS — I48.92 ATRIAL FLUTTER, UNSPECIFIED TYPE (H): ICD-10-CM

## 2023-06-27 DIAGNOSIS — D68.51 FACTOR V LEIDEN (H): ICD-10-CM

## 2023-06-27 DIAGNOSIS — I48.20 CHRONIC ATRIAL FIBRILLATION (H): Primary | ICD-10-CM

## 2023-06-27 DIAGNOSIS — R42 DIZZINESS: ICD-10-CM

## 2023-06-27 DIAGNOSIS — G80.9 CEREBRAL PALSY, UNSPECIFIED TYPE (H): ICD-10-CM

## 2023-06-27 PROCEDURE — 99214 OFFICE O/P EST MOD 30 MIN: CPT | Performed by: INTERNAL MEDICINE

## 2023-06-27 RX ORDER — CLOTRIMAZOLE 1 %
CREAM (GRAM) TOPICAL 2 TIMES DAILY
COMMUNITY

## 2023-06-27 RX ORDER — AMMONIUM LACTATE 12 G/100G
CREAM TOPICAL 2 TIMES DAILY
COMMUNITY

## 2023-06-27 RX ORDER — GUAIFENESIN AND DEXTROMETHORPHAN HYDROBROMIDE 600; 30 MG/1; MG/1
1 TABLET, EXTENDED RELEASE ORAL EVERY 12 HOURS
COMMUNITY

## 2023-06-27 NOTE — PROGRESS NOTES
HEART CARE ENCOUNTER CONSULTATON NOTE      [facility]  [phone]      Assessment/Recommendations   Assessment:   1.  Paroxsymal atypical flutter s/p ablation   2.  Paroxysmal A. Fib.    3.  Factor V Leiden with history of DVT 2012  4.  Seizure disorder  5.  Syncope  6.  Cerebral palsy    Plan:   1.  Patient had fall in May 2023, ED interrgation demonstrated stable heart rate during fall.    2.  Continue Eliquis twice daily for A. fib and stroke prevention along with factor V Leiden and prior DVT  3.  Continue metoprolol 25 mg twice daily for rate control of A. Fib    Follow-up in 12 months.         History of Present Illness/Subjective    HPI: Lavon Iraheta is a 53 year old male history of atrial flutter, atrial fib status, post atrial flutter ablation, mental delay, seizure disorder who presents to cardiology clinic in follow-up    Since last clinical evaluation the patient's had a stable cardiovascular course.  He underwent placement of implantable loop recorder.  He did have a fall in May 2023 which I reviewed ED provider notes.  During his fall he had a heart rate of 103 bpm.  No ventricular arrhythmias.  No significant pauses.  No bradycardic events to explain his fall.  His caregiver from his group home is with him today.  He is doing well from the standpoint of lightheadedness.  He remains active.  No active bleeding issues with his anticoagulation.    Most recent implantable loop device recordings reviewed.  Continues have intermittent flutter and intermittent A-fib.  Heart rates are between  beats majority of the time.  Remains on metoprolol therapy.    Echo: Reviewed  The left ventricle is normal in size.  Left ventricular function is normal.The ejection fraction is 55-60%.  Normal right ventricle size and systolic function.  The left atrium is moderately dilated.  No hemodynamically significant valvular abnormalities on 2D or color flow  imaging.         Physical  Examination  Review of Systems   Vitals: /70 (BP Location: Left arm, Patient Position: Sitting, Cuff Size: Adult Large)   Pulse 72   Resp 16   Wt 105.2 kg (232 lb)   BMI 31.91 kg/m    BMI= Body mass index is 31.91 kg/m .  Wt Readings from Last 3 Encounters:   06/27/23 105.2 kg (232 lb)   05/17/23 105.7 kg (233 lb 1.6 oz)   04/10/23 105.2 kg (232 lb)        Pleasant, obesity   ENT/Mouth: membranes moist, no oral lesions or bleeding gums.      EYES:  no scleral icterus, normal conjunctivae       Chest/Lungs:   lungs are clear to auscultation, no rales or wheezing,  sternal scar, equal chest wall expansion    Cardiovascular:   Regular. Normal first and second heart sounds with no murmurs, rubs, or gallops; the carotid, radial and posterior tibial pulses are intact, Jugular venous pressure normal, no pitting edema bilaterally.  No change   Abdomen:  no tenderness; bowel sounds are present   Extremities: no cyanosis or clubbing   Skin: no xanthelasma, warm.    Neurologic: normal  bilateral, no tremors     Psychiatric: alert and oriented x3, calm        Please refer above for cardiac ROS details.        Medical History  Surgical History Family History Social History   Past Medical History:   Diagnosis Date    Cerebral palsy (H) 8/30/2022    Chronic atrial fibrillation (H) 8/30/2022    DVT (deep venous thrombosis) (H) 8/30/2022    Epilepsy (H) 8/30/2022    Factor V Leiden (H) 8/30/2022    GERD (gastroesophageal reflux disease)     Hyperlipidemia LDL goal <100 8/30/2022    Long term current use of anticoagulant therapy 8/30/2022    Mental retardation     Seizure disorder (H)     Sleep apnea 8/30/2022     Past Surgical History:   Procedure Laterality Date    ABLATION OF DYSRHYTHMIC FOCUS      AV NODE ABLATION      CATARACT EXTRACTION EXTRACAPSULAR W/ INTRAOCULAR LENS IMPLANTATION Bilateral     EP LOOP RECORDER IMPLANT N/A 4/10/2023    Procedure: Loop Recorder Implant;  Surgeon: Abdullahi Goss MD;  Location:   [facility] CV    GALLBLADDER SURGERY      SPINAL FUSION       No family history on file.     Social History     Socioeconomic History    Marital status: Single     Spouse name: Not on file    Number of children: Not on file    Years of education: Not on file    Highest education level: Not on file   Occupational History    Not on file   Tobacco Use    Smoking status: Never    Smokeless tobacco: Never   Substance and Sexual Activity    Alcohol use: Not on file    Drug use: Not on file    Sexual activity: Not on file   Other Topics Concern    Not on file   Social History Narrative    He is a resident of a group home (12/22/2014)     Social Determinants of Health     Financial Resource Strain: Not on file   Food Insecurity: Not on file   Transportation Needs: Not on file   Physical Activity: Not on file   Stress: Not on file   Social Connections: Not on file   Intimate Partner Violence: Not on file   Housing Stability: Not on file           Medications  Allergies   Current Outpatient Medications   Medication Sig Dispense Refill    ACETAMINOPHEN (TYLENOL ORAL) Take 1,000 mg by mouth every 6 hours as needed      ammonium lactate (AMLACTIN) 12 % external cream Apply topically 2 times daily      calcium carbonate (TUMS E-X) 300 mg (750 mg) Chew Take 2 tablets by mouth 3 times daily as needed      CHOLECALCIFEROL, VITAMIN D3, (VITAMIN D3 ORAL) [CHOLECALCIFEROL, VITAMIN D3, (VITAMIN D3 ORAL)] Take 2,000 Units by mouth daily.       clotrimazole (LOTRIMIN) 1 % external cream Apply topically 2 times daily      dextromethorphan-guaiFENesin (MUCINEX DM)  MG 12 hr tablet Take 1 tablet by mouth every 12 hours      ELIQUIS ANTICOAGULANT 5 MG tablet TAKE 1 TABLET BY MOUTH TWICE DAILY. 60 tablet 5    gabapentin (NEURONTIN) 100 MG capsule At Bedtime      lamoTRIgine (LAMICTAL) 100 MG tablet Take 100 mg by mouth 2 times daily Along with a 25 mg tablet for a dose = 125 mg      lamoTRIgine (LAMICTAL) 25 MG tablet Take 25 mg by  mouth 2 times daily Along with a 100 mg tablet for a dose = 125 mg      METHYLCELLULOSE (CITRUCEL ORAL) Take 2 g by mouth daily as needed      metoprolol tartrate (LOPRESSOR) 25 MG tablet Take 25 mg by mouth 2 times daily      ondansetron (ZOFRAN ODT) 4 MG ODT tab Take 1 tablet (4 mg) by mouth every 8 hours as needed for nausea 15 tablet 0       Allergies   Allergen Reactions    Biaxin [Clarithromycin] Unknown    Codeine Unknown    Diphenhydramine Hcl [Diphenhydramine] Unknown    Levofloxacin Unknown    Morphine Unknown          Lab Results    Chemistry/lipid CBC Cardiac Enzymes/BNP/TSH/INR   Recent Labs   Lab Test 10/25/21  1618   CHOL 194   HDL 33*      TRIG 470*     Recent Labs   Lab Test 10/25/21  1618 11/12/19  1000 10/16/18  1644    118 120     Recent Labs   Lab Test 09/08/22  1705      POTASSIUM 4.3   CHLORIDE 103   CO2 31*   GLC 95   BUN 16.1   CR 1.34*   GFRESTIMATED 64   VICENTA 9.9     Recent Labs   Lab Test 09/08/22  1705 08/29/22  2141 02/18/22  1517   CR 1.34* 1.12 1.11     No results for input(s): A1C in the last 53382 hours.       Recent Labs   Lab Test 08/29/22  2141   WBC 8.7   HGB 13.6   HCT 41.5   MCV 99   *     Recent Labs   Lab Test 08/29/22  2141 05/18/20  1605 05/23/18  1643   HGB 13.6 13.9* 13.4*    No results for input(s): TROPONINI in the last 42606 hours.  No results for input(s): BNP, NTBNPI, NTBNP in the last 10588 hours.  Recent Labs   Lab Test 03/15/22  1631   TSH 3.71     No results for input(s): INR in the last 47273 hours.     Robin Antunez DO            Date of service 3/24/2023

## 2023-07-12 ENCOUNTER — ANCILLARY PROCEDURE (OUTPATIENT)
Dept: CARDIOLOGY | Facility: CLINIC | Age: 53
End: 2023-07-12
Attending: INTERNAL MEDICINE
Payer: MEDICARE

## 2023-07-12 DIAGNOSIS — R55 SYNCOPE, UNSPECIFIED SYNCOPE TYPE: ICD-10-CM

## 2023-07-12 DIAGNOSIS — I48.0 PAROXYSMAL ATRIAL FIBRILLATION (H): ICD-10-CM

## 2023-07-12 DIAGNOSIS — Z95.818 IMPLANTABLE LOOP RECORDER PRESENT: ICD-10-CM

## 2023-07-12 PROCEDURE — 93295 DEV INTERROG REMOTE 1/2/MLT: CPT | Performed by: INTERNAL MEDICINE

## 2023-07-12 PROCEDURE — 93296 REM INTERROG EVL PM/IDS: CPT | Performed by: INTERNAL MEDICINE

## 2023-08-22 ENCOUNTER — LAB REQUISITION (OUTPATIENT)
Dept: LAB | Facility: CLINIC | Age: 53
End: 2023-08-22
Payer: MEDICARE

## 2023-08-22 DIAGNOSIS — G40.209 LOCALIZATION-RELATED (FOCAL) (PARTIAL) SYMPTOMATIC EPILEPSY AND EPILEPTIC SYNDROMES WITH COMPLEX PARTIAL SEIZURES, NOT INTRACTABLE, WITHOUT STATUS EPILEPTICUS (H): ICD-10-CM

## 2023-08-22 PROCEDURE — 80175 DRUG SCREEN QUAN LAMOTRIGINE: CPT | Mod: ORL | Performed by: FAMILY MEDICINE

## 2023-08-24 LAB — LAMOTRIGINE SERPL-MCNC: 8.2 UG/ML

## 2023-09-04 ENCOUNTER — NURSE TRIAGE (OUTPATIENT)
Dept: NURSING | Facility: CLINIC | Age: 53
End: 2023-09-04
Payer: MEDICARE

## 2023-09-04 NOTE — TELEPHONE ENCOUNTER
Guardian calling. Started yesterday. He has green bowel movements. Today was partially formed, still green and loose this morning. Sample saved. Abdominal pain points to his belly button. It comes and goes. When he's more stressed there is more pain. He has had some stress the last few weeks as well. He's had four outbursts at group home with four individual staffs. He is special needs.  She took some pictures.    Cece Sun RN  [facility]    Reason for Disposition   [1] Abnormal color is unexplained AND [2] persists > 24 hours    Additional Information   Negative: Rectal bleeding, bloody stools, or blood in stool (bowel movement)   Negative: Black or tarry bowel movements   Negative: [1] Stool color is black (not dark green) AND [2] patient hasn't swallowed substance that causes black stools (e.g., Pepto-Bismol, iron pills)   Negative: Diarrhea stools (i.e., watery stools, or increase in the frequency and looseness of stools)   Negative: [1] Abdomen pain is main symptom AND [2] male   Negative: [1] Abdomen pain is main symptom AND [2] adult female   Negative: Yellow eyes (jaundice)   Negative: Patient sounds very sick or weak to the triager   Negative: [1] Stool is light gray or whitish AND [2] unexplained   Negative: [1] Stool is mucus or pus AND [2] persists > 24 hours    Protocols used: Stools - Unusual Color-A-

## 2023-09-06 ENCOUNTER — LAB REQUISITION (OUTPATIENT)
Dept: LAB | Facility: CLINIC | Age: 53
End: 2023-09-06
Payer: MEDICARE

## 2023-09-06 DIAGNOSIS — R19.5 OTHER FECAL ABNORMALITIES: ICD-10-CM

## 2023-09-06 PROCEDURE — 87507 IADNA-DNA/RNA PROBE TQ 12-25: CPT | Mod: ORL | Performed by: FAMILY MEDICINE

## 2023-09-06 PROCEDURE — 87329 GIARDIA AG IA: CPT | Mod: ORL | Performed by: FAMILY MEDICINE

## 2023-09-06 PROCEDURE — 87328 CRYPTOSPORIDIUM AG IA: CPT | Mod: ORL | Performed by: FAMILY MEDICINE

## 2023-09-07 LAB
ADV 40+41 DNA STL QL NAA+NON-PROBE: NEGATIVE
ASTRO TYP 1-8 RNA STL QL NAA+NON-PROBE: NEGATIVE
C CAYETANENSIS DNA STL QL NAA+NON-PROBE: NEGATIVE
C PARVUM AG STL QL IA: NEGATIVE
CAMPYLOBACTER DNA SPEC NAA+PROBE: NEGATIVE
CRYPTOSP DNA STL QL NAA+NON-PROBE: NEGATIVE
E COLI O157 DNA STL QL NAA+NON-PROBE: NORMAL
E HISTOLYT DNA STL QL NAA+NON-PROBE: NEGATIVE
EAEC ASTA GENE ISLT QL NAA+PROBE: NEGATIVE
EC STX1+STX2 GENES STL QL NAA+NON-PROBE: NEGATIVE
EPEC EAE GENE STL QL NAA+NON-PROBE: NEGATIVE
ETEC LTA+ST1A+ST1B TOX ST NAA+NON-PROBE: NEGATIVE
G LAMBLIA AG STL QL IA: NEGATIVE
G LAMBLIA DNA STL QL NAA+NON-PROBE: NEGATIVE
NOROVIRUS GI+II RNA STL QL NAA+NON-PROBE: NEGATIVE
P SHIGELLOIDES DNA STL QL NAA+NON-PROBE: NEGATIVE
RVA RNA STL QL NAA+NON-PROBE: NEGATIVE
SALMONELLA SP RPOD STL QL NAA+PROBE: NEGATIVE
SAPO I+II+IV+V RNA STL QL NAA+NON-PROBE: NEGATIVE
SHIGELLA SP+EIEC IPAH ST NAA+NON-PROBE: NEGATIVE
V CHOLERAE DNA SPEC QL NAA+PROBE: NEGATIVE
VIBRIO DNA SPEC NAA+PROBE: NEGATIVE
Y ENTEROCOL DNA STL QL NAA+PROBE: NEGATIVE

## 2023-10-09 ENCOUNTER — ANCILLARY PROCEDURE (OUTPATIENT)
Dept: CARDIOLOGY | Facility: CLINIC | Age: 53
End: 2023-10-09
Attending: INTERNAL MEDICINE
Payer: MEDICARE

## 2023-10-09 DIAGNOSIS — R55 SYNCOPE, UNSPECIFIED SYNCOPE TYPE: ICD-10-CM

## 2023-10-09 DIAGNOSIS — Z95.818 IMPLANTABLE LOOP RECORDER PRESENT: ICD-10-CM

## 2023-10-09 DIAGNOSIS — I48.0 PAROXYSMAL ATRIAL FIBRILLATION (H): ICD-10-CM

## 2023-10-09 PROCEDURE — G2066 INTER DEVC REMOTE 30D: HCPCS | Performed by: INTERNAL MEDICINE

## 2023-10-09 PROCEDURE — 93297 REM INTERROG DEV EVAL ICPMS: CPT | Performed by: INTERNAL MEDICINE

## 2023-10-11 ENCOUNTER — DOCUMENTATION ONLY (OUTPATIENT)
Dept: CARDIOLOGY | Facility: CLINIC | Age: 53
End: 2023-10-11
Payer: MEDICARE

## 2023-10-11 NOTE — PROGRESS NOTES
Type: routine remote loop recorder transmission.  Presenting rhythm: atrial fibrillation with ventricular rate 90's.  Battery status: good  Arrhythmias: since 7/12/23, 443 AF episodes, burden 93%. One pause episode on 8/14/23, 3 seconds. No AT, tachy, orin or symptom triggered episodes.  Anticoagulant: Eliquis  Comments: normal loop recorder function.  Plan: routed to device RN. Next remote transmission January 9, 2024.  ADD: called patient's group home who did not answer and there was no voice mail to leave a message. Device clinic will continue to monitor. Routed to Dr. Antunez as CONRAD.

## 2023-10-30 ENCOUNTER — LAB REQUISITION (OUTPATIENT)
Dept: LAB | Facility: CLINIC | Age: 53
End: 2023-10-30
Payer: MEDICARE

## 2023-10-30 DIAGNOSIS — G40.209 LOCALIZATION-RELATED (FOCAL) (PARTIAL) SYMPTOMATIC EPILEPSY AND EPILEPTIC SYNDROMES WITH COMPLEX PARTIAL SEIZURES, NOT INTRACTABLE, WITHOUT STATUS EPILEPTICUS (H): ICD-10-CM

## 2023-10-30 PROCEDURE — 80053 COMPREHEN METABOLIC PANEL: CPT | Mod: ORL | Performed by: FAMILY MEDICINE

## 2023-10-30 PROCEDURE — 80175 DRUG SCREEN QUAN LAMOTRIGINE: CPT | Mod: ORL | Performed by: FAMILY MEDICINE

## 2023-10-31 LAB
ALBUMIN SERPL BCG-MCNC: 4.4 G/DL (ref 3.5–5.2)
ALP SERPL-CCNC: 79 U/L (ref 40–129)
ALT SERPL W P-5'-P-CCNC: 21 U/L (ref 0–70)
ANION GAP SERPL CALCULATED.3IONS-SCNC: 12 MMOL/L (ref 7–15)
AST SERPL W P-5'-P-CCNC: 18 U/L (ref 0–45)
BILIRUB SERPL-MCNC: 0.2 MG/DL
BUN SERPL-MCNC: 17.7 MG/DL (ref 6–20)
CALCIUM SERPL-MCNC: 9.2 MG/DL (ref 8.6–10)
CHLORIDE SERPL-SCNC: 105 MMOL/L (ref 98–107)
CREAT SERPL-MCNC: 1.08 MG/DL (ref 0.67–1.17)
DEPRECATED HCO3 PLAS-SCNC: 25 MMOL/L (ref 22–29)
EGFRCR SERPLBLD CKD-EPI 2021: 83 ML/MIN/1.73M2
GLUCOSE SERPL-MCNC: 106 MG/DL (ref 70–99)
POTASSIUM SERPL-SCNC: 4.2 MMOL/L (ref 3.4–5.3)
PROT SERPL-MCNC: 6.9 G/DL (ref 6.4–8.3)
SODIUM SERPL-SCNC: 142 MMOL/L (ref 135–145)

## 2023-11-01 LAB — LAMOTRIGINE SERPL-MCNC: 9 UG/ML

## 2023-11-03 NOTE — PROGRESS NOTES
[facility] [phone]    [facility] VIDEO VISIT PROGRESS NOTE    CHIEF COMPLAINT  Chief Complaint   Patient presents with   • Video Visit   • Eye Problem     Had a typical cold symptoms the past few days, woke up this morning with what appears to be pink eye.        SUBJECTIVE  HISTORY OF PRESENT ILLNESS:   Stan Carlisle is a 19 year old female who consents to a two-way Video Visit (V-Visit) for evaluation of an eye concern.      Stan reports redness , irritation, purulent drainage, crusting and swelling to upper lid to the left eye. Symptom onset: last night.   Reports use of contact lenses.  Photophobia: denies  Vision changes/blurred vision: denies  Foreign body sensation: denies   Injury/trauma to the eye: denies  The patient denies deep eyeball pain, pain with eyeball movement, headache, nausea, vomiting, or fever.     Associated symptoms or recent upper respiratory illness symptoms: yes; cold x2-3 days      Contact with person with similar symptoms: denies  The patient has tried nothing for their current symptoms, which has been N/A.    HISTORIES  Current medications, allergies, past medical history, past surgical history and social history have been reviewed and updated in the electronic medical record.    ALLERGIES  ALLERGIES:   Allergen Reactions   • Penicillins SHORTNESS OF BREATH        MEDICATIONS  Current Outpatient Medications   Medication Sig   • ofloxacin (OCUFLOX) 0.3 % ophthalmic solution 1 drop to affected eye(s) every 2 hours while awake until eye(s) clear, then 4 time daily for total of 5-7 days.   • mupirocin (BACTROBAN) 2 % ointment Apply a pea size amount to the affected area(s) 3-4 per day for 1 month   • triamcinolone (ARISTOCORT) 0.1 % ointment APPLY TWICE DAILY FOR 1 WEEK THEN DAILY FOR 1 WEEK THEN WEEKENDS ONLY   • LORazepam (ATIVAN) 0.5 MG tablet TK 1 T PO D AS NEEDED FOR SLEEP OR ANXIETY   • FLUoxetine (PROZAC) 10 MG capsule TK 1 C PO QAM   • lidocaine  Pt left for MRI at 1515 with Swat Nurse.   viscous (XYLOCAINE) 2 % solution Take 10 mLs by mouth as needed for Pain (dab some liquid on tongue with a qtip or swish and spit with 10 ml).   • ibuprofen (MOTRIN) 400 MG tablet Take 400 mg by mouth every 6 hours as needed for Pain.   • solifenacin (VESICARE) 5 MG tablet Take 1 tablet by mouth daily.   • albuterol 108 (90 Base) MCG/ACT inhaler Inhale 2 puffs into the lungs every 4 hours as needed for Shortness of Breath or Wheezing.   • fluticasone (FLONASE) 50 MCG/ACT nasal spray Spray in each nostril daily.     No current facility-administered medications for this visit.        OBJECTIVE  PHYSICAL EXAM:   Information acquired with patient assistance, demonstration, and feedback due to two-way video visit method of visit. Portions of assessment may be difficult to visualize precisely given nature of technology and limitations of assessment with virtual platform.   CONSTITUTIONAL: Well-hydrated, well-nourished patient who appears to be in no acute distress. Awake, alert and cooperative.  NEUROLOGIC: Alert and oriented x 3.    SKIN: Skin normal color, no visual texture change, with no lesions or eruptions.  PSYCHIATRIC: Oriented to person, place, and time. The patient was able to demonstrate good judgement and reason, without hallucinations, abnormal affect or abnormal behaviors during the examination.   EYES: Right eye: conjunctiva normal, sclera normal, lid and lashes are normal and no tearing or drainage. Left eye: conjunctiva erythematous, sclera erythematous, tearing , purulent drainage and upper eyelid swelling . No pain with palpation by patient bilaterally. Bilateral pupils are equal and round. Bilateral extraocular movements are normal. No ciliary flush or corneal opacity.     Inspection of the eye under magnification and including inversion of the upper and lower eye lids was not possible due to video constraints to exam modality therefore unable to rule out the presence of lesions, abrasions, ulcers,  infiltrate, dendritic markings, or other abnormal findings.    ASSESSMENT/PLAN   Acute conjunctivitis of left eye, unspecified acute conjunctivitis type  - ofloxacin (OCUFLOX) 0.3 % ophthalmic solution; 1 drop to affected eye(s) every 2 hours while awake until eye(s) clear, then 4 time daily for total of 5-7 days.  Dispense: 5 mL; Refill: 0    Discussed likely viral etiology based on duration, reported symptoms, and associated URI.  Patient is a contact lens wearer; did recommend watchful waiting and supplied with drops in the event her symptoms evolve to a more bacterial etiology or do not improve over the next 3-5 days; advised on good hand/respiratory hygiene, avoid contact lens use while symptomatic.  The patient is in no acute distress and no evidence of red flag symptoms including eye pain/pressure, vision changes, fever, double vision were discussed and warrant immediate evaluation.     FOLLOW-UP   Return for As needed .  If the patient has unresolved symptoms, they have been advised to seek an in person evaluation with their primary care provider or at an Urgent Care/Immediate Care.    If symptoms get worse, the patient should be seen immediately at an Urgent Care/Immediate Care or the Emergency Department.       PATIENT INSTRUCTIONS  Attached in After Visit Summary  The patient verbalizes understanding of the diagnosis and plan of care. There were no further questions or concerns.   They were advised to contact the [facility] RN with any questions at [phone].    CONSENT:  Patient consent was obtained for this Video Visit using the [employer] joshua.    Clinical Location: [facility] [place] [place]  Patient is located at home in the [facility]    Gi Barcenas NP  11/3/2023  9:12 AM

## 2023-12-19 ENCOUNTER — DOCUMENTATION ONLY (OUTPATIENT)
Dept: CARDIOLOGY | Facility: CLINIC | Age: 53
End: 2023-12-19
Payer: MEDICARE

## 2023-12-19 NOTE — PROGRESS NOTES
Encounter type: alert remote loop recorder transmission for pause detected.  Presenting rhythm: atrial fibrillation with ventricular sensing, rate 84 bpm.  Battery status: Good  Arrhythmias: since 10/9/23, one pause episode on 12/18/23; rhythm is AF with longest R-R interval 3100 ms. AF burden 91%. No AT, tachy, orin or symptom episodes.  Anticoagulant: Eliquis  Comments: normal loop recorder function.  Plan: routed to device RNViktoriya Us, Device Specialist. ADD: ~3 second pause noted, similar to past episodes. Previously reviewed by Dr. Antunez. Monitoring for now. Resides in group home.    Ilene Colon RN

## 2023-12-29 NOTE — LETTER
6/27/2023    Guille Brooks MD  [address]  [facility] [address]    RE: Lavon Iraheta       Dear Colleague,     I had the pleasure of seeing Lavon Iraheta in the [facility].    HEART CARE ENCOUNTER CONSULTATON NOTE      [facility]  [phone]      Assessment/Recommendations   Assessment:   1.  Paroxsymal atypical flutter s/p ablation   2.  Paroxysmal A. Fib.    3.  Factor V Leiden with history of DVT 2012  4.  Seizure disorder  5.  Syncope  6.  Cerebral palsy    Plan:   1.  Patient had fall in May 2023, ED interrgation demonstrated stable heart rate during fall.    2.  Continue Eliquis twice daily for A. fib and stroke prevention along with factor V Leiden and prior DVT  3.  Continue metoprolol 25 mg twice daily for rate control of A. Fib    Follow-up in 12 months.         History of Present Illness/Subjective    HPI: Lavon Iraheta is a 53 year old male history of atrial flutter, atrial fib status, post atrial flutter ablation, mental delay, seizure disorder who presents to cardiology clinic in follow-up    Since last clinical evaluation the patient's had a stable cardiovascular course.  He underwent placement of implantable loop recorder.  He did have a fall in May 2023 which I reviewed ED provider notes.  During his fall he had a heart rate of 103 bpm.  No ventricular arrhythmias.  No significant pauses.  No bradycardic events to explain his fall.  His caregiver from his group home is with him today.  He is doing well from the standpoint of lightheadedness.  He remains active.  No active bleeding issues with his anticoagulation.    Most recent implantable loop device recordings reviewed.  Continues have intermittent flutter and intermittent A-fib.  Heart rates are between  beats majority of the time.  Remains on metoprolol therapy.    Echo: Reviewed  The left ventricle is normal in size.  Left ventricular function is normal.The ejection fraction is  55-60%.  Normal right ventricle size and systolic function.  The left atrium is moderately dilated.  No hemodynamically significant valvular abnormalities on 2D or color flow  imaging.         Physical Examination  Review of Systems   Vitals: /70 (BP Location: Left arm, Patient Position: Sitting, Cuff Size: Adult Large)   Pulse 72   Resp 16   Wt 105.2 kg (232 lb)   BMI 31.91 kg/m    BMI= Body mass index is 31.91 kg/m .  Wt Readings from Last 3 Encounters:   06/27/23 105.2 kg (232 lb)   05/17/23 105.7 kg (233 lb 1.6 oz)   04/10/23 105.2 kg (232 lb)        Pleasant, obesity   ENT/Mouth: membranes moist, no oral lesions or bleeding gums.      EYES:  no scleral icterus, normal conjunctivae       Chest/Lungs:   lungs are clear to auscultation, no rales or wheezing,  sternal scar, equal chest wall expansion    Cardiovascular:   Regular. Normal first and second heart sounds with no murmurs, rubs, or gallops; the carotid, radial and posterior tibial pulses are intact, Jugular venous pressure normal, no pitting edema bilaterally.  No change   Abdomen:  no tenderness; bowel sounds are present   Extremities: no cyanosis or clubbing   Skin: no xanthelasma, warm.    Neurologic: normal  bilateral, no tremors     Psychiatric: alert and oriented x3, calm        Please refer above for cardiac ROS details.        Medical History  Surgical History Family History Social History   Past Medical History:   Diagnosis Date    Cerebral palsy (H) 8/30/2022    Chronic atrial fibrillation (H) 8/30/2022    DVT (deep venous thrombosis) (H) 8/30/2022    Epilepsy (H) 8/30/2022    Factor V Leiden (H) 8/30/2022    GERD (gastroesophageal reflux disease)     Hyperlipidemia LDL goal <100 8/30/2022    Long term current use of anticoagulant therapy 8/30/2022    Mental retardation     Seizure disorder (H)     Sleep apnea 8/30/2022     Past Surgical History:   Procedure Laterality Date    ABLATION OF DYSRHYTHMIC FOCUS      AV NODE ABLATION       CATARACT EXTRACTION EXTRACAPSULAR W/ INTRAOCULAR LENS IMPLANTATION Bilateral     EP LOOP RECORDER IMPLANT N/A 4/10/2023    Procedure: Loop Recorder Implant;  Surgeon: Abdullahi Goss MD;  Location: [facility] CV    GALLBLADDER SURGERY      SPINAL FUSION       No family history on file.     Social History     Socioeconomic History    Marital status: Single     Spouse name: Not on file    Number of children: Not on file    Years of education: Not on file    Highest education level: Not on file   Occupational History    Not on file   Tobacco Use    Smoking status: Never    Smokeless tobacco: Never   Substance and Sexual Activity    Alcohol use: Not on file    Drug use: Not on file    Sexual activity: Not on file   Other Topics Concern    Not on file   Social History Narrative    He is a resident of a group home (12/22/2014)     Social Determinants of Health     Financial Resource Strain: Not on file   Food Insecurity: Not on file   Transportation Needs: Not on file   Physical Activity: Not on file   Stress: Not on file   Social Connections: Not on file   Intimate Partner Violence: Not on file   Housing Stability: Not on file           Medications  Allergies   Current Outpatient Medications   Medication Sig Dispense Refill    ACETAMINOPHEN (TYLENOL ORAL) Take 1,000 mg by mouth every 6 hours as needed      ammonium lactate (AMLACTIN) 12 % external cream Apply topically 2 times daily      calcium carbonate (TUMS E-X) 300 mg (750 mg) Chew Take 2 tablets by mouth 3 times daily as needed      CHOLECALCIFEROL, VITAMIN D3, (VITAMIN D3 ORAL) [CHOLECALCIFEROL, VITAMIN D3, (VITAMIN D3 ORAL)] Take 2,000 Units by mouth daily.       clotrimazole (LOTRIMIN) 1 % external cream Apply topically 2 times daily      dextromethorphan-guaiFENesin (MUCINEX DM)  MG 12 hr tablet Take 1 tablet by mouth every 12 hours      ELIQUIS ANTICOAGULANT 5 MG tablet TAKE 1 TABLET BY MOUTH TWICE DAILY. 60 tablet 5    gabapentin (NEURONTIN)  100 MG capsule At Bedtime      lamoTRIgine (LAMICTAL) 100 MG tablet Take 100 mg by mouth 2 times daily Along with a 25 mg tablet for a dose = 125 mg      lamoTRIgine (LAMICTAL) 25 MG tablet Take 25 mg by mouth 2 times daily Along with a 100 mg tablet for a dose = 125 mg      METHYLCELLULOSE (CITRUCEL ORAL) Take 2 g by mouth daily as needed      metoprolol tartrate (LOPRESSOR) 25 MG tablet Take 25 mg by mouth 2 times daily      ondansetron (ZOFRAN ODT) 4 MG ODT tab Take 1 tablet (4 mg) by mouth every 8 hours as needed for nausea 15 tablet 0       Allergies   Allergen Reactions    Biaxin [Clarithromycin] Unknown    Codeine Unknown    Diphenhydramine Hcl [Diphenhydramine] Unknown    Levofloxacin Unknown    Morphine Unknown          Lab Results    Chemistry/lipid CBC Cardiac Enzymes/BNP/TSH/INR   Recent Labs   Lab Test 10/25/21  1618   CHOL 194   HDL 33*      TRIG 470*     Recent Labs   Lab Test 10/25/21  1618 11/12/19  1000 10/16/18  1644    118 120     Recent Labs   Lab Test 09/08/22  1705      POTASSIUM 4.3   CHLORIDE 103   CO2 31*   GLC 95   BUN 16.1   CR 1.34*   GFRESTIMATED 64   VICENTA 9.9     Recent Labs   Lab Test 09/08/22  1705 08/29/22  2141 02/18/22  1517   CR 1.34* 1.12 1.11     No results for input(s): A1C in the last 48443 hours.       Recent Labs   Lab Test 08/29/22  2141   WBC 8.7   HGB 13.6   HCT 41.5   MCV 99   *     Recent Labs   Lab Test 08/29/22  2141 05/18/20  1605 05/23/18  1643   HGB 13.6 13.9* 13.4*    No results for input(s): TROPONINI in the last 55508 hours.  No results for input(s): BNP, NTBNPI, NTBNP in the last 78483 hours.  Recent Labs   Lab Test 03/15/22  1631   TSH 3.71     No results for input(s): INR in the last 50935 hours.     Robin Antunez DO      Date of service 3/24/2023              Thank you for allowing me to participate in the care of your patient.      Sincerely,     Robin Antunez DO     [facility]  [facility]  cc:   No referring provider defined for this encounter.   quit 3 months ago/cigarettes

## 2024-01-09 ENCOUNTER — ANCILLARY PROCEDURE (OUTPATIENT)
Dept: CARDIOLOGY | Facility: CLINIC | Age: 54
End: 2024-01-09
Attending: INTERNAL MEDICINE
Payer: MEDICARE

## 2024-01-09 DIAGNOSIS — I48.0 PAROXYSMAL ATRIAL FIBRILLATION (H): ICD-10-CM

## 2024-01-09 DIAGNOSIS — Z95.818 IMPLANTABLE LOOP RECORDER PRESENT: ICD-10-CM

## 2024-01-09 DIAGNOSIS — R55 SYNCOPE, UNSPECIFIED SYNCOPE TYPE: ICD-10-CM

## 2024-01-09 PROCEDURE — 93297 REM INTERROG DEV EVAL ICPMS: CPT | Performed by: INTERNAL MEDICINE

## 2024-03-13 ENCOUNTER — OFFICE VISIT (OUTPATIENT)
Dept: CARDIOLOGY | Facility: CLINIC | Age: 54
End: 2024-03-13
Payer: MEDICARE

## 2024-03-13 VITALS
DIASTOLIC BLOOD PRESSURE: 68 MMHG | WEIGHT: 230 LBS | HEART RATE: 82 BPM | BODY MASS INDEX: 31.63 KG/M2 | SYSTOLIC BLOOD PRESSURE: 106 MMHG | RESPIRATION RATE: 14 BRPM

## 2024-03-13 DIAGNOSIS — D68.51 FACTOR V LEIDEN (H): ICD-10-CM

## 2024-03-13 DIAGNOSIS — I48.4 ATYPICAL ATRIAL FLUTTER (H): ICD-10-CM

## 2024-03-13 DIAGNOSIS — I82.4Y9 DEEP VEIN THROMBOSIS (DVT) OF PROXIMAL LOWER EXTREMITY, UNSPECIFIED CHRONICITY, UNSPECIFIED LATERALITY (H): ICD-10-CM

## 2024-03-13 DIAGNOSIS — I48.0 PAROXYSMAL ATRIAL FIBRILLATION (H): ICD-10-CM

## 2024-03-13 DIAGNOSIS — R55 SYNCOPE, UNSPECIFIED SYNCOPE TYPE: Primary | ICD-10-CM

## 2024-03-13 PROCEDURE — 99214 OFFICE O/P EST MOD 30 MIN: CPT | Performed by: INTERNAL MEDICINE

## 2024-03-13 RX ORDER — DICLOFENAC SODIUM 75 MG/1
75 TABLET, DELAYED RELEASE ORAL DAILY PRN
COMMUNITY

## 2024-03-13 RX ORDER — LAMOTRIGINE 150 MG/1
150 TABLET ORAL 2 TIMES DAILY
COMMUNITY
Start: 2024-03-04

## 2024-03-13 RX ORDER — NYSTATIN 100000 [USP'U]/G
1 POWDER TOPICAL 4 TIMES DAILY
COMMUNITY
Start: 2023-10-23

## 2024-03-13 RX ORDER — KETOCONAZOLE 20 MG/ML
SHAMPOO TOPICAL DAILY PRN
COMMUNITY

## 2024-03-13 RX ORDER — FAMOTIDINE 20 MG/1
20 TABLET, FILM COATED ORAL
COMMUNITY
Start: 2023-10-16

## 2024-03-13 RX ORDER — LORATADINE 10 MG/1
10 TABLET ORAL DAILY PRN
COMMUNITY
Start: 2023-08-23

## 2024-03-13 NOTE — PROGRESS NOTES
HEART CARE ENCOUNTER CONSULTATON NOTE      [facility]  [phone]      Assessment/Recommendations   Assessment:   1.  Paroxsymal atypical flutter s/p ablation.  Currently has ongoing atrial flutter on monitoring with no clear side effects.  Cardiac monitor demonstrated 91% atrial flutter.  Controlled heart rates on metoprolol  2.  Paroxysmal A. Fib.    3.  Factor V Leiden with history of DVT 2012  4.  Seizure disorder, [facility] (Dr. Carter).    5.  Syncope  6.  Cerebral palsy  7.  Linq II cardiac monitor.  Recent device interrogation 1/9/2024 demonstrated ongoing atrial flutter with controlled heart rates.  Atrial flutter burden was 91%.  There is no pauses or significant bradycardic episodes.    Plan:   1.  Continue Eliquis twice daily for A. fib and stroke prevention along with factor V Leiden and prior DVT  2.  Continue metoprolol 25 mg twice daily for rate control of A. Fib  3.  Episodes of change in cognition have improved with up titration of seizure medications.  Reviewed cardiac monitor in detail with no events of concern.    Follow-up in 12 months.         History of Present Illness/Subjective    HPI: Lvaon Iraheta is a 53 year old male history of atrial flutter, atrial fib status, post atrial flutter ablation, mental delay, seizure disorder who presents to cardiology clinic in follow-up    's last clinical evaluation he was seen by his neurology team.  His Lamictal was increased which has improved his intermittent episodes of change in consciousness.    Recent device interrogation demonstrated normal device function.  No significant ventricular arrhythmias.  He remains in atrial flutter over 90% of the time with controlled heart rates.  He is anticoagulated for factor V Leiden as well as stroke prevention for his atrial flutter.  Does have a history of DVT.    Currently he denies any syncopal episodes.  Support staff denies any chest pain or dyspnea.  No lower extremity  edema.  No irritation at device site    Echo: Reviewed  The left ventricle is normal in size.  Left ventricular function is normal.The ejection fraction is 55-60%.  Normal right ventricle size and systolic function.  The left atrium is moderately dilated.  No hemodynamically significant valvular abnormalities on 2D or color flow  imaging.         Physical Examination  Review of Systems   Vitals: /68 (BP Location: Right arm, Patient Position: Sitting, Cuff Size: Adult Large)   Pulse 82   Resp 14   Wt 104.3 kg (230 lb)   BMI 31.63 kg/m    BMI= Body mass index is 31.63 kg/m .  Wt Readings from Last 3 Encounters:   03/13/24 104.3 kg (230 lb)   06/27/23 105.2 kg (232 lb)   05/17/23 105.7 kg (233 lb 1.6 oz)        Pleasant, obesity   ENT/Mouth: membranes moist, no oral lesions or bleeding gums.      EYES:  no scleral icterus, normal conjunctivae       Chest/Lungs:   lungs are clear to auscultation, no rales or wheezing,  sternal scar, equal chest wall expansion    Cardiovascular:   Regular. Normal first and second heart sounds with no murmurs, rubs, or gallops; the carotid, radial and posterior tibial pulses are intact, Jugular venous pressure normal, no pitting edema bilaterally.  No change   Abdomen:  no tenderness; bowel sounds are present   Extremities: no cyanosis or clubbing   Skin: no xanthelasma, warm.    Neurologic: normal  bilateral, no tremors     Psychiatric: alert and oriented.  calm        Please refer above for cardiac ROS details.        Medical History  Surgical History Family History Social History   Past Medical History:   Diagnosis Date    Cerebral palsy (H) 8/30/2022    Chronic atrial fibrillation (H) 8/30/2022    DVT (deep venous thrombosis) (H) 8/30/2022    Epilepsy (H) 8/30/2022    Factor V Leiden (H24) 8/30/2022    GERD (gastroesophageal reflux disease)     Hyperlipidemia LDL goal <100 8/30/2022    Long term current use of anticoagulant therapy 8/30/2022    Mental retardation      Seizure disorder (H)     Sleep apnea 8/30/2022     Past Surgical History:   Procedure Laterality Date    ABLATION OF DYSRHYTHMIC FOCUS      AV NODE ABLATION      CATARACT EXTRACTION EXTRACAPSULAR W/ INTRAOCULAR LENS IMPLANTATION Bilateral     EP LOOP RECORDER IMPLANT N/A 4/10/2023    Procedure: Loop Recorder Implant;  Surgeon: Abdullahi Goss MD;  Location: [facility] CV    GALLBLADDER SURGERY      SPINAL FUSION       No family history on file.     Social History     Socioeconomic History    Marital status: Single     Spouse name: Not on file    Number of children: Not on file    Years of education: Not on file    Highest education level: Not on file   Occupational History    Not on file   Tobacco Use    Smoking status: Never    Smokeless tobacco: Never   Substance and Sexual Activity    Alcohol use: Not on file    Drug use: Not on file    Sexual activity: Not on file   Other Topics Concern    Not on file   Social History Narrative    He is a resident of a group home (12/22/2014)     Social Determinants of Health     Financial Resource Strain: Not on file   Food Insecurity: Not on file   Transportation Needs: Not on file   Physical Activity: Not on file   Stress: Not on file   Social Connections: Not on file   Interpersonal Safety: Not on file   Housing Stability: Not on file           Medications  Allergies   Current Outpatient Medications   Medication Sig Dispense Refill    ACETAMINOPHEN (TYLENOL ORAL) Take 1,000 mg by mouth every 6 hours as needed      ammonium lactate (AMLACTIN) 12 % external cream Apply topically 2 times daily      CHOLECALCIFEROL, VITAMIN D3, (VITAMIN D3 ORAL) [CHOLECALCIFEROL, VITAMIN D3, (VITAMIN D3 ORAL)] Take 2,000 Units by mouth daily.       clotrimazole (LOTRIMIN) 1 % external cream Apply topically 2 times daily      dextromethorphan-guaiFENesin (MUCINEX DM)  MG 12 hr tablet Take 1 tablet by mouth every 12 hours      diclofenac (VOLTAREN) 75 MG EC tablet Take 75 mg by mouth  daily as needed for moderate pain      ELIQUIS ANTICOAGULANT 5 MG tablet TAKE 1 TABLET BY MOUTH TWICE DAILY. 60 tablet 5    famotidine (PEPCID) 20 MG tablet Take 20 mg by mouth nightly as needed      gabapentin (NEURONTIN) 100 MG capsule At Bedtime      ketoconazole (NIZORAL) 2 % external shampoo Apply topically daily as needed for itching or irritation      lamoTRIgine (LAMICTAL) 150 MG tablet Take 150 mg by mouth 2 times daily      loratadine (CLARITIN) 10 MG tablet Take 10 mg by mouth daily as needed      metoprolol tartrate (LOPRESSOR) 25 MG tablet Take 25 mg by mouth 2 times daily      nystatin (MYCOSTATIN) 741257 UNIT/GM external powder Apply 1 strip topically 4 times daily      calcium carbonate (TUMS E-X) 300 mg (750 mg) Chew Take 2 tablets by mouth 3 times daily as needed (Patient not taking: Reported on 3/13/2024)      METHYLCELLULOSE (CITRUCEL ORAL) Take 2 g by mouth daily as needed (Patient not taking: Reported on 3/13/2024)      ondansetron (ZOFRAN ODT) 4 MG ODT tab Take 1 tablet (4 mg) by mouth every 8 hours as needed for nausea (Patient not taking: Reported on 3/13/2024) 15 tablet 0       Allergies   Allergen Reactions    Biaxin [Clarithromycin] Unknown    Codeine Unknown    Diphenhydramine Hcl [Diphenhydramine] Unknown    Levofloxacin Unknown    Morphine Unknown          Lab Results    Chemistry/lipid CBC Cardiac Enzymes/BNP/TSH/INR   Recent Labs   Lab Test 10/25/21  1618   CHOL 194   HDL 33*      TRIG 470*     Recent Labs   Lab Test 10/25/21  1618 11/12/19  1000 10/16/18  1644    118 120     Recent Labs   Lab Test 09/08/22  1705      POTASSIUM 4.3   CHLORIDE 103   CO2 31*   GLC 95   BUN 16.1   CR 1.34*   GFRESTIMATED 64   VICENTA 9.9     Recent Labs   Lab Test 09/08/22  1705 08/29/22  2141 02/18/22  1517   CR 1.34* 1.12 1.11     No results for input(s): A1C in the last 26727 hours.       Recent Labs   Lab Test 08/29/22  2141   WBC 8.7   HGB 13.6   HCT 41.5   MCV 99   *      Recent Labs   Lab Test 08/29/22  2141 05/18/20  1605 05/23/18  1643   HGB 13.6 13.9* 13.4*    No results for input(s): TROPONINI in the last 34639 hours.  No results for input(s): BNP, NTBNPI, NTBNP in the last 08585 hours.  Recent Labs   Lab Test 03/15/22  1631   TSH 3.71     No results for input(s): INR in the last 52490 hours.     Robin Antunez DO    Patient will require long-term management of his arrhythmia.  He has a Linq device which will require frequent device checks.  He is also on anticoagulation for stroke prevention.  Will continue with routine intermittent checks of his device.

## 2024-03-13 NOTE — LETTER
3/13/2024    Guille Brooks MD  [address]  [facility] [address]    RE: Lavon Iraheta       Dear Colleague,     I had the pleasure of seeing Lavon Iraheta in the [facility].    HEART CARE ENCOUNTER CONSULTATON NOTE      M [facility]  [phone]      Assessment/Recommendations   Assessment:   1.  Paroxsymal atypical flutter s/p ablation.  Currently has ongoing atrial flutter on monitoring with no clear side effects.  Cardiac monitor demonstrated 91% atrial flutter.  Controlled heart rates on metoprolol  2.  Paroxysmal A. Fib.    3.  Factor V Leiden with history of DVT 2012  4.  Seizure disorder, [facility] (Dr. Carter).    5.  Syncope  6.  Cerebral palsy  7.  Linq II cardiac monitor.  Recent device interrogation 1/9/2024 demonstrated ongoing atrial flutter with controlled heart rates.  Atrial flutter burden was 91%.  There is no pauses or significant bradycardic episodes.    Plan:   1.  Continue Eliquis twice daily for A. fib and stroke prevention along with factor V Leiden and prior DVT  2.  Continue metoprolol 25 mg twice daily for rate control of A. Fib  3.  Episodes of change in cognition have improved with up titration of seizure medications.  Reviewed cardiac monitor in detail with no events of concern.    Follow-up in 12 months.         History of Present Illness/Subjective    HPI: Lavon Iraheta is a 53 year old male history of atrial flutter, atrial fib status, post atrial flutter ablation, mental delay, seizure disorder who presents to cardiology clinic in follow-up    's last clinical evaluation he was seen by his neurology team.  His Lamictal was increased which has improved his intermittent episodes of change in consciousness.    Recent device interrogation demonstrated normal device function.  No significant ventricular arrhythmias.  He remains in atrial flutter over 90% of the time with controlled heart rates.  He is anticoagulated for factor V  Abigail as well as stroke prevention for his atrial flutter.  Does have a history of DVT.    Currently he denies any syncopal episodes.  Support staff denies any chest pain or dyspnea.  No lower extremity edema.  No irritation at device site    Echo: Reviewed  The left ventricle is normal in size.  Left ventricular function is normal.The ejection fraction is 55-60%.  Normal right ventricle size and systolic function.  The left atrium is moderately dilated.  No hemodynamically significant valvular abnormalities on 2D or color flow  imaging.         Physical Examination  Review of Systems   Vitals: /68 (BP Location: Right arm, Patient Position: Sitting, Cuff Size: Adult Large)   Pulse 82   Resp 14   Wt 104.3 kg (230 lb)   BMI 31.63 kg/m    BMI= Body mass index is 31.63 kg/m .  Wt Readings from Last 3 Encounters:   03/13/24 104.3 kg (230 lb)   06/27/23 105.2 kg (232 lb)   05/17/23 105.7 kg (233 lb 1.6 oz)        Pleasant, obesity   ENT/Mouth: membranes moist, no oral lesions or bleeding gums.      EYES:  no scleral icterus, normal conjunctivae       Chest/Lungs:   lungs are clear to auscultation, no rales or wheezing,  sternal scar, equal chest wall expansion    Cardiovascular:   Regular. Normal first and second heart sounds with no murmurs, rubs, or gallops; the carotid, radial and posterior tibial pulses are intact, Jugular venous pressure normal, no pitting edema bilaterally.  No change   Abdomen:  no tenderness; bowel sounds are present   Extremities: no cyanosis or clubbing   Skin: no xanthelasma, warm.    Neurologic: normal  bilateral, no tremors     Psychiatric: alert and oriented.  calm        Please refer above for cardiac ROS details.        Medical History  Surgical History Family History Social History   Past Medical History:   Diagnosis Date    Cerebral palsy (H) 8/30/2022    Chronic atrial fibrillation (H) 8/30/2022    DVT (deep venous thrombosis) (H) 8/30/2022    Epilepsy (H) 8/30/2022     Factor V Leiden (H24) 8/30/2022    GERD (gastroesophageal reflux disease)     Hyperlipidemia LDL goal <100 8/30/2022    Long term current use of anticoagulant therapy 8/30/2022    Mental retardation     Seizure disorder (H)     Sleep apnea 8/30/2022     Past Surgical History:   Procedure Laterality Date    ABLATION OF DYSRHYTHMIC FOCUS      AV NODE ABLATION      CATARACT EXTRACTION EXTRACAPSULAR W/ INTRAOCULAR LENS IMPLANTATION Bilateral     EP LOOP RECORDER IMPLANT N/A 4/10/2023    Procedure: Loop Recorder Implant;  Surgeon: Abdullahi Goss MD;  Location: [facility] CV    GALLBLADDER SURGERY      SPINAL FUSION       No family history on file.     Social History     Socioeconomic History    Marital status: Single     Spouse name: Not on file    Number of children: Not on file    Years of education: Not on file    Highest education level: Not on file   Occupational History    Not on file   Tobacco Use    Smoking status: Never    Smokeless tobacco: Never   Substance and Sexual Activity    Alcohol use: Not on file    Drug use: Not on file    Sexual activity: Not on file   Other Topics Concern    Not on file   Social History Narrative    He is a resident of a group home (12/22/2014)     Social Determinants of Health     Financial Resource Strain: Not on file   Food Insecurity: Not on file   Transportation Needs: Not on file   Physical Activity: Not on file   Stress: Not on file   Social Connections: Not on file   Interpersonal Safety: Not on file   Housing Stability: Not on file           Medications  Allergies   Current Outpatient Medications   Medication Sig Dispense Refill    ACETAMINOPHEN (TYLENOL ORAL) Take 1,000 mg by mouth every 6 hours as needed      ammonium lactate (AMLACTIN) 12 % external cream Apply topically 2 times daily      CHOLECALCIFEROL, VITAMIN D3, (VITAMIN D3 ORAL) [CHOLECALCIFEROL, VITAMIN D3, (VITAMIN D3 ORAL)] Take 2,000 Units by mouth daily.       clotrimazole (LOTRIMIN) 1 % external cream  Apply topically 2 times daily      dextromethorphan-guaiFENesin (MUCINEX DM)  MG 12 hr tablet Take 1 tablet by mouth every 12 hours      diclofenac (VOLTAREN) 75 MG EC tablet Take 75 mg by mouth daily as needed for moderate pain      ELIQUIS ANTICOAGULANT 5 MG tablet TAKE 1 TABLET BY MOUTH TWICE DAILY. 60 tablet 5    famotidine (PEPCID) 20 MG tablet Take 20 mg by mouth nightly as needed      gabapentin (NEURONTIN) 100 MG capsule At Bedtime      ketoconazole (NIZORAL) 2 % external shampoo Apply topically daily as needed for itching or irritation      lamoTRIgine (LAMICTAL) 150 MG tablet Take 150 mg by mouth 2 times daily      loratadine (CLARITIN) 10 MG tablet Take 10 mg by mouth daily as needed      metoprolol tartrate (LOPRESSOR) 25 MG tablet Take 25 mg by mouth 2 times daily      nystatin (MYCOSTATIN) 711053 UNIT/GM external powder Apply 1 strip topically 4 times daily      calcium carbonate (TUMS E-X) 300 mg (750 mg) Chew Take 2 tablets by mouth 3 times daily as needed (Patient not taking: Reported on 3/13/2024)      METHYLCELLULOSE (CITRUCEL ORAL) Take 2 g by mouth daily as needed (Patient not taking: Reported on 3/13/2024)      ondansetron (ZOFRAN ODT) 4 MG ODT tab Take 1 tablet (4 mg) by mouth every 8 hours as needed for nausea (Patient not taking: Reported on 3/13/2024) 15 tablet 0       Allergies   Allergen Reactions    Biaxin [Clarithromycin] Unknown    Codeine Unknown    Diphenhydramine Hcl [Diphenhydramine] Unknown    Levofloxacin Unknown    Morphine Unknown          Lab Results    Chemistry/lipid CBC Cardiac Enzymes/BNP/TSH/INR   Recent Labs   Lab Test 10/25/21  1618   CHOL 194   HDL 33*      TRIG 470*     Recent Labs   Lab Test 10/25/21  1618 11/12/19  1000 10/16/18  1644    118 120     Recent Labs   Lab Test 09/08/22  1705      POTASSIUM 4.3   CHLORIDE 103   CO2 31*   GLC 95   BUN 16.1   CR 1.34*   GFRESTIMATED 64   VICENTA 9.9     Recent Labs   Lab Test 09/08/22  1705  08/29/22 2141 02/18/22  1517   CR 1.34* 1.12 1.11     No results for input(s): A1C in the last 37381 hours.       Recent Labs   Lab Test 08/29/22 2141   WBC 8.7   HGB 13.6   HCT 41.5   MCV 99   *     Recent Labs   Lab Test 08/29/22 2141 05/18/20  1605 05/23/18  1643   HGB 13.6 13.9* 13.4*    No results for input(s): TROPONINI in the last 14883 hours.  No results for input(s): BNP, NTBNPI, NTBNP in the last 59069 hours.  Recent Labs   Lab Test 03/15/22  1631   TSH 3.71     No results for input(s): INR in the last 42903 hours.     Robin Antunez DO    Patient will require long-term management of his arrhythmia.  He has a Linq device which will require frequent device checks.  He is also on anticoagulation for stroke prevention.  Will continue with routine intermittent checks of his device.                      Thank you for allowing me to participate in the care of your patient.      Sincerely,     Robin Antunez DO     [facility]  cc:   Robin Antunez DO  [address] [place] [address]

## 2024-04-05 ENCOUNTER — LAB REQUISITION (OUTPATIENT)
Dept: LAB | Facility: CLINIC | Age: 54
End: 2024-04-05
Payer: MEDICARE

## 2024-04-05 DIAGNOSIS — E55.9 VITAMIN D DEFICIENCY, UNSPECIFIED: ICD-10-CM

## 2024-04-05 PROCEDURE — 82306 VITAMIN D 25 HYDROXY: CPT | Mod: ORL | Performed by: FAMILY MEDICINE

## 2024-04-06 LAB — VIT D+METAB SERPL-MCNC: 43 NG/ML (ref 20–50)

## 2024-04-17 ENCOUNTER — ANCILLARY PROCEDURE (OUTPATIENT)
Dept: CARDIOLOGY | Facility: CLINIC | Age: 54
End: 2024-04-17
Attending: INTERNAL MEDICINE
Payer: MEDICARE

## 2024-04-17 DIAGNOSIS — I48.0 PAROXYSMAL ATRIAL FIBRILLATION (H): ICD-10-CM

## 2024-04-17 DIAGNOSIS — R55 SYNCOPE, UNSPECIFIED SYNCOPE TYPE: ICD-10-CM

## 2024-04-17 DIAGNOSIS — Z95.818 IMPLANTABLE LOOP RECORDER PRESENT: ICD-10-CM

## 2024-04-30 LAB
MDC_IDC_EPISODE_DTM: NORMAL
MDC_IDC_EPISODE_DURATION: 1200 S
MDC_IDC_EPISODE_DURATION: 1440 S
MDC_IDC_EPISODE_DURATION: 1560 S
MDC_IDC_EPISODE_DURATION: 2280 S
MDC_IDC_EPISODE_DURATION: 2400 S
MDC_IDC_EPISODE_DURATION: 2520 S
MDC_IDC_EPISODE_DURATION: 2760 S
MDC_IDC_EPISODE_DURATION: 3960 S
MDC_IDC_EPISODE_DURATION: 4320 S
MDC_IDC_EPISODE_DURATION: 4440 S
MDC_IDC_EPISODE_DURATION: 4560 S
MDC_IDC_EPISODE_DURATION: 4800 S
MDC_IDC_EPISODE_DURATION: 6840 S
MDC_IDC_EPISODE_DURATION: 7200 S
MDC_IDC_EPISODE_DURATION: 7560 S
MDC_IDC_EPISODE_DURATION: 7920 S
MDC_IDC_EPISODE_DURATION: 9000 S
MDC_IDC_EPISODE_DURATION: 960 S
MDC_IDC_EPISODE_DURATION: 960 S
MDC_IDC_EPISODE_DURATION: NORMAL S
MDC_IDC_EPISODE_ID: 2413
MDC_IDC_EPISODE_ID: 2415
MDC_IDC_EPISODE_ID: 2416
MDC_IDC_EPISODE_ID: 2417
MDC_IDC_EPISODE_ID: 2418
MDC_IDC_EPISODE_ID: 2419
MDC_IDC_EPISODE_ID: 2420
MDC_IDC_EPISODE_ID: 2421
MDC_IDC_EPISODE_ID: 2422
MDC_IDC_EPISODE_ID: 2423
MDC_IDC_EPISODE_ID: 2424
MDC_IDC_EPISODE_ID: 2425
MDC_IDC_EPISODE_ID: 2426
MDC_IDC_EPISODE_ID: 2427
MDC_IDC_EPISODE_ID: 2428
MDC_IDC_EPISODE_ID: 2431
MDC_IDC_EPISODE_ID: 2432
MDC_IDC_EPISODE_ID: 2433
MDC_IDC_EPISODE_ID: 2435
MDC_IDC_EPISODE_ID: 2436
MDC_IDC_EPISODE_ID: 2437
MDC_IDC_EPISODE_ID: 2438
MDC_IDC_EPISODE_ID: 2439
MDC_IDC_EPISODE_ID: 2440
MDC_IDC_EPISODE_ID: 2444
MDC_IDC_EPISODE_ID: 2445
MDC_IDC_EPISODE_ID: 2446
MDC_IDC_EPISODE_ID: 2447
MDC_IDC_EPISODE_ID: 2448
MDC_IDC_EPISODE_TYPE: NORMAL
MDC_IDC_PG_IMPLANT_DTM: NORMAL
MDC_IDC_PG_MFG: NORMAL
MDC_IDC_PG_MODEL: NORMAL
MDC_IDC_PG_SERIAL: NORMAL
MDC_IDC_PG_TYPE: NORMAL
MDC_IDC_SESS_CLINIC_NAME: NORMAL
MDC_IDC_SESS_DTM: NORMAL
MDC_IDC_SESS_TYPE: NORMAL
MDC_IDC_STAT_AT_BURDEN_PERCENT: 92 %

## 2024-04-30 PROCEDURE — 93298 REM INTERROG DEV EVAL SCRMS: CPT | Performed by: INTERNAL MEDICINE

## 2024-07-17 ENCOUNTER — ANCILLARY PROCEDURE (OUTPATIENT)
Dept: CARDIOLOGY | Facility: CLINIC | Age: 54
End: 2024-07-17
Attending: INTERNAL MEDICINE
Payer: MEDICARE

## 2024-07-17 DIAGNOSIS — I48.0 PAROXYSMAL ATRIAL FIBRILLATION (H): ICD-10-CM

## 2024-07-17 DIAGNOSIS — R55 SYNCOPE, UNSPECIFIED SYNCOPE TYPE: ICD-10-CM

## 2024-07-17 DIAGNOSIS — Z95.818 IMPLANTABLE LOOP RECORDER PRESENT: ICD-10-CM

## 2024-07-19 LAB
MDC_IDC_EPISODE_DTM: NORMAL
MDC_IDC_EPISODE_DURATION: 2160 S
MDC_IDC_EPISODE_DURATION: 2160 S
MDC_IDC_EPISODE_DURATION: 8160 S
MDC_IDC_EPISODE_DURATION: NORMAL S
MDC_IDC_EPISODE_DURATION: NORMAL S
MDC_IDC_EPISODE_ID: 3092
MDC_IDC_EPISODE_ID: 3093
MDC_IDC_EPISODE_ID: 3097
MDC_IDC_EPISODE_ID: 3098
MDC_IDC_EPISODE_ID: 3099
MDC_IDC_EPISODE_TYPE: NORMAL
MDC_IDC_MSMT_BATTERY_STATUS: NORMAL
MDC_IDC_PG_IMPLANT_DTM: NORMAL
MDC_IDC_PG_MFG: NORMAL
MDC_IDC_PG_MODEL: NORMAL
MDC_IDC_PG_SERIAL: NORMAL
MDC_IDC_PG_TYPE: NORMAL
MDC_IDC_SESS_CLINIC_NAME: NORMAL
MDC_IDC_SESS_DTM: NORMAL
MDC_IDC_SESS_TYPE: NORMAL
MDC_IDC_SET_ZONE_DETECTION_BEATS_DENOMINATOR: 16 {BEATS}
MDC_IDC_SET_ZONE_DETECTION_BEATS_DENOMINATOR: 4 {BEATS}
MDC_IDC_SET_ZONE_DETECTION_BEATS_NUMERATOR: 16 {BEATS}
MDC_IDC_SET_ZONE_DETECTION_BEATS_NUMERATOR: 4 {BEATS}
MDC_IDC_SET_ZONE_DETECTION_INTERVAL: 2000 MS
MDC_IDC_SET_ZONE_DETECTION_INTERVAL: 3000 MS
MDC_IDC_SET_ZONE_DETECTION_INTERVAL: 340 MS
MDC_IDC_SET_ZONE_STATUS: NORMAL
MDC_IDC_SET_ZONE_TYPE: NORMAL
MDC_IDC_SET_ZONE_VENDOR_TYPE: NORMAL
MDC_IDC_STAT_AT_BURDEN_PERCENT: 98.7 %
MDC_IDC_STAT_AT_DTM_END: NORMAL
MDC_IDC_STAT_AT_DTM_START: NORMAL
MDC_IDC_STAT_EPISODE_RECENT_COUNT: 0
MDC_IDC_STAT_EPISODE_RECENT_COUNT: 5
MDC_IDC_STAT_EPISODE_RECENT_COUNT_DTM_END: NORMAL
MDC_IDC_STAT_EPISODE_RECENT_COUNT_DTM_START: NORMAL
MDC_IDC_STAT_EPISODE_TOTAL_COUNT: 0
MDC_IDC_STAT_EPISODE_TOTAL_COUNT: 2087
MDC_IDC_STAT_EPISODE_TOTAL_COUNT: 7
MDC_IDC_STAT_EPISODE_TOTAL_COUNT_DTM_END: NORMAL
MDC_IDC_STAT_EPISODE_TOTAL_COUNT_DTM_START: NORMAL
MDC_IDC_STAT_EPISODE_TYPE: NORMAL

## 2024-07-19 PROCEDURE — 93298 REM INTERROG DEV EVAL SCRMS: CPT | Performed by: INTERNAL MEDICINE

## 2024-07-22 ENCOUNTER — LAB REQUISITION (OUTPATIENT)
Dept: LAB | Facility: CLINIC | Age: 54
End: 2024-07-22
Payer: MEDICARE

## 2024-07-22 DIAGNOSIS — E55.9 VITAMIN D DEFICIENCY, UNSPECIFIED: ICD-10-CM

## 2024-07-22 DIAGNOSIS — Z83.2 FAMILY HISTORY OF DISEASES OF THE BLOOD AND BLOOD-FORMING ORGANS AND CERTAIN DISORDERS INVOLVING THE IMMUNE MECHANISM: ICD-10-CM

## 2024-07-22 DIAGNOSIS — R46.89 OTHER SYMPTOMS AND SIGNS INVOLVING APPEARANCE AND BEHAVIOR: ICD-10-CM

## 2024-07-22 LAB
ALBUMIN SERPL BCG-MCNC: 4.3 G/DL (ref 3.5–5.2)
ALP SERPL-CCNC: 70 U/L (ref 40–150)
ALT SERPL W P-5'-P-CCNC: 15 U/L (ref 0–70)
ANION GAP SERPL CALCULATED.3IONS-SCNC: 10 MMOL/L (ref 7–15)
AST SERPL W P-5'-P-CCNC: 17 U/L (ref 0–45)
BASOPHILS # BLD AUTO: 0 10E3/UL (ref 0–0.2)
BASOPHILS NFR BLD AUTO: 0 %
BILIRUB SERPL-MCNC: 0.2 MG/DL
BUN SERPL-MCNC: 14.1 MG/DL (ref 6–20)
CALCIUM SERPL-MCNC: 9.1 MG/DL (ref 8.8–10.4)
CHLORIDE SERPL-SCNC: 106 MMOL/L (ref 98–107)
CREAT SERPL-MCNC: 1.1 MG/DL (ref 0.67–1.17)
EGFRCR SERPLBLD CKD-EPI 2021: 80 ML/MIN/1.73M2
EOSINOPHIL # BLD AUTO: 0.1 10E3/UL (ref 0–0.7)
EOSINOPHIL NFR BLD AUTO: 1 %
ERYTHROCYTE [DISTWIDTH] IN BLOOD BY AUTOMATED COUNT: 13.7 % (ref 10–15)
GLUCOSE SERPL-MCNC: 90 MG/DL (ref 70–99)
HCO3 SERPL-SCNC: 27 MMOL/L (ref 22–29)
HCT VFR BLD AUTO: 43.5 % (ref 40–53)
HGB BLD-MCNC: 14 G/DL (ref 13.3–17.7)
IMM GRANULOCYTES # BLD: 0.1 10E3/UL
IMM GRANULOCYTES NFR BLD: 1 %
LYMPHOCYTES # BLD AUTO: 2.2 10E3/UL (ref 0.8–5.3)
LYMPHOCYTES NFR BLD AUTO: 37 %
MCH RBC QN AUTO: 32 PG (ref 26.5–33)
MCHC RBC AUTO-ENTMCNC: 32.2 G/DL (ref 31.5–36.5)
MCV RBC AUTO: 100 FL (ref 78–100)
MONOCYTES # BLD AUTO: 0.5 10E3/UL (ref 0–1.3)
MONOCYTES NFR BLD AUTO: 9 %
NEUTROPHILS # BLD AUTO: 3.1 10E3/UL (ref 1.6–8.3)
NEUTROPHILS NFR BLD AUTO: 52 %
NRBC # BLD AUTO: 0 10E3/UL
NRBC BLD AUTO-RTO: 0 /100
PLATELET # BLD AUTO: 140 10E3/UL (ref 150–450)
POTASSIUM SERPL-SCNC: 4.4 MMOL/L (ref 3.4–5.3)
PROT SERPL-MCNC: 7.2 G/DL (ref 6.4–8.3)
RBC # BLD AUTO: 4.37 10E6/UL (ref 4.4–5.9)
SODIUM SERPL-SCNC: 143 MMOL/L (ref 135–145)
T3 SERPL-MCNC: 115 NG/DL (ref 85–202)
T4 FREE SERPL-MCNC: 0.95 NG/DL (ref 0.9–1.7)
TSH SERPL DL<=0.005 MIU/L-ACNC: 5.3 UIU/ML (ref 0.3–4.2)
VIT B12 SERPL-MCNC: 442 PG/ML (ref 232–1245)
VIT D+METAB SERPL-MCNC: 37 NG/ML (ref 20–50)
WBC # BLD AUTO: 6 10E3/UL (ref 4–11)

## 2024-07-22 PROCEDURE — 82607 VITAMIN B-12: CPT | Mod: ORL | Performed by: FAMILY MEDICINE

## 2024-07-22 PROCEDURE — 84439 ASSAY OF FREE THYROXINE: CPT | Mod: ORL | Performed by: FAMILY MEDICINE

## 2024-07-22 PROCEDURE — 84480 ASSAY TRIIODOTHYRONINE (T3): CPT | Mod: ORL | Performed by: FAMILY MEDICINE

## 2024-07-22 PROCEDURE — 82306 VITAMIN D 25 HYDROXY: CPT | Mod: ORL | Performed by: FAMILY MEDICINE

## 2024-07-22 PROCEDURE — 80053 COMPREHEN METABOLIC PANEL: CPT | Mod: ORL | Performed by: FAMILY MEDICINE

## 2024-07-22 PROCEDURE — 84443 ASSAY THYROID STIM HORMONE: CPT | Mod: ORL | Performed by: FAMILY MEDICINE

## 2024-07-22 PROCEDURE — 85025 COMPLETE CBC W/AUTO DIFF WBC: CPT | Mod: ORL | Performed by: FAMILY MEDICINE

## 2024-08-21 ENCOUNTER — LAB REQUISITION (OUTPATIENT)
Dept: LAB | Facility: CLINIC | Age: 54
End: 2024-08-21
Payer: MEDICARE

## 2024-08-21 DIAGNOSIS — Z03.818 ENCOUNTER FOR OBSERVATION FOR SUSPECTED EXPOSURE TO OTHER BIOLOGICAL AGENTS RULED OUT: ICD-10-CM

## 2024-08-21 PROCEDURE — 87081 CULTURE SCREEN ONLY: CPT | Mod: ORL | Performed by: FAMILY MEDICINE

## 2024-08-23 LAB — BACTERIA SPEC CULT: NORMAL

## 2024-09-04 ENCOUNTER — LAB REQUISITION (OUTPATIENT)
Dept: LAB | Facility: CLINIC | Age: 54
End: 2024-09-04
Payer: MEDICARE

## 2024-09-04 DIAGNOSIS — R79.89 OTHER SPECIFIED ABNORMAL FINDINGS OF BLOOD CHEMISTRY: ICD-10-CM

## 2024-09-04 PROCEDURE — 84443 ASSAY THYROID STIM HORMONE: CPT | Mod: ORL | Performed by: FAMILY MEDICINE

## 2024-09-04 PROCEDURE — 84439 ASSAY OF FREE THYROXINE: CPT | Mod: ORL | Performed by: FAMILY MEDICINE

## 2024-09-05 LAB
T4 FREE SERPL-MCNC: 1 NG/DL (ref 0.9–1.7)
TSH SERPL DL<=0.005 MIU/L-ACNC: 4.45 UIU/ML (ref 0.3–4.2)

## 2024-10-17 ENCOUNTER — ANCILLARY PROCEDURE (OUTPATIENT)
Dept: CARDIOLOGY | Facility: CLINIC | Age: 54
End: 2024-10-17
Attending: INTERNAL MEDICINE
Payer: MEDICARE

## 2024-10-17 DIAGNOSIS — R55 SYNCOPE, UNSPECIFIED SYNCOPE TYPE: ICD-10-CM

## 2024-10-17 DIAGNOSIS — I48.0 PAROXYSMAL ATRIAL FIBRILLATION (H): ICD-10-CM

## 2024-10-17 DIAGNOSIS — Z95.818 IMPLANTABLE LOOP RECORDER PRESENT: ICD-10-CM

## 2024-10-18 PROCEDURE — 93298 REM INTERROG DEV EVAL SCRMS: CPT | Performed by: INTERNAL MEDICINE

## 2024-11-15 ENCOUNTER — OFFICE VISIT (OUTPATIENT)
Dept: CARDIOLOGY | Facility: CLINIC | Age: 54
End: 2024-11-15
Payer: MEDICARE

## 2024-11-15 VITALS
SYSTOLIC BLOOD PRESSURE: 106 MMHG | BODY MASS INDEX: 31.77 KG/M2 | DIASTOLIC BLOOD PRESSURE: 58 MMHG | HEART RATE: 66 BPM | WEIGHT: 231 LBS | RESPIRATION RATE: 16 BRPM

## 2024-11-15 DIAGNOSIS — I48.3 TYPICAL ATRIAL FLUTTER (H): ICD-10-CM

## 2024-11-15 DIAGNOSIS — D68.51 FACTOR V LEIDEN (H): ICD-10-CM

## 2024-11-15 DIAGNOSIS — Z79.01 LONG TERM CURRENT USE OF ANTICOAGULANT THERAPY: ICD-10-CM

## 2024-11-15 DIAGNOSIS — R55 SYNCOPE, UNSPECIFIED SYNCOPE TYPE: Primary | ICD-10-CM

## 2024-11-15 DIAGNOSIS — I48.0 PAROXYSMAL ATRIAL FIBRILLATION (H): ICD-10-CM

## 2024-11-15 RX ORDER — DESONIDE 0.5 MG/G
CREAM TOPICAL PRN
COMMUNITY
Start: 2024-06-25

## 2024-11-15 RX ORDER — KETOCONAZOLE 20 MG/G
CREAM TOPICAL PRN
COMMUNITY
Start: 2024-06-25

## 2024-11-15 RX ORDER — CLINDAMYCIN PHOSPHATE 10 UG/ML
LOTION TOPICAL
COMMUNITY
Start: 2024-06-25

## 2024-11-15 NOTE — PATIENT INSTRUCTIONS
Please contact direct nurses line Monday through Friday 8 AM to 5 PM @ [phone]    After-hours contact cardiology office at [phone].    Plan:

## 2024-11-15 NOTE — LETTER
11/15/2024    Becca Dolan MD  [address] [place] [address]    RE: Lavon Iraheta       Dear Colleague,     I had the pleasure of seeing Lavon Iraheta in the [facility].    HEART CARE ENCOUNTER CONSULTATON NOTE      M [facility]  [phone]      Assessment/Recommendations   Assessment:   1.  Paroxsymal atypical flutter s/p ablation.  Currently has ongoing atrial flutter on monitoring with no clear side effects.  Cardiac monitor demonstrated >90% atrial flutter.  Controlled heart rates on metoprolol  2.  Paroxysmal A. Fib.    3.  Factor V Leiden with history of DVT 2012  4.  Seizure disorder, [facility] (Dr. Carter).    5.  Syncope  6.  Cerebral palsy  7.  Linq II cardiac monitor.  Recent device interrogation 1/9/2024 demonstrated ongoing atrial flutter with controlled heart rates.  Atrial flutter burden was >90%.  There is no significant bradycardic episodes.  No symptomatic pauses.  No pauses > 5 seconds.     Plan:   1.  Continue Eliquis twice daily for A. fib and stroke prevention along with factor V Leiden and prior DVT  2.  Continue metoprolol 25 mg twice daily for rate control of A. Fib  3.  Episodes of change in cognition have improved with up titration of seizure medications.  Reviewed cardiac monitor in detail with no events of concern.    Follow-up in 6 months.         History of Present Illness/Subjective    HPI: Lavon Iraheta is a 54 year old male history of atrial flutter, atrial fib status, post atrial flutter ablation, mental delay, seizure disorder who presents to cardiology clinic in follow-up.      No active cardiac symptoms.  Reviewed implantable loop monitor in detail.  He denies any cardiac symptoms.  Zio patch demonstrated no sustained ventricular arrhythmias.  No significant pauses greater than 5 seconds.    Implantable loop Recorder:   Type: routine remote loop recorder transmission.   Device: Medtronic Linq II.   Presenting rhythm: AFib VS   bpm   Battery status: Good.   Arrhythmias: Since 7/18/2024 426 AF episodes, burden 91.8%, v-rates >/=120bpm 5%, known AFib. 4 pause episodes, AFib with 3 second pauses. No other events detected.   Anticoagulant: Eliquis.     Echo: 2022  The left ventricle is normal in size.  Left ventricular function is normal.The ejection fraction is 55-60%.  Normal right ventricle size and systolic function.  The left atrium is moderately dilated.  No hemodynamically significant valvular abnormalities on 2D or color flow  imaging.         Physical Examination  Review of Systems   Vitals: /58 (BP Location: Left arm, Patient Position: Sitting, Cuff Size: Adult Large)   Pulse 66   Resp 16   Wt 104.8 kg (231 lb)   BMI 31.77 kg/m    BMI= Body mass index is 31.77 kg/m .  Wt Readings from Last 3 Encounters:   03/13/24 104.3 kg (230 lb)   06/27/23 105.2 kg (232 lb)   05/17/23 105.7 kg (233 lb 1.6 oz)        Pleasant, obesity   ENT/Mouth: membranes moist, no oral lesions or bleeding gums.      EYES:  no scleral icterus, normal conjunctivae       Chest/Lungs:   lungs are clear to auscultation, no rales or wheezing,  sternal scar, equal chest wall expansion    Cardiovascular:   Regular. Normal first and second heart sounds with no murmurs, rubs, or gallops; the carotid, radial and posterior tibial pulses are intact, Jugular venous pressure normal, no pitting edema bilaterally.  No change   Abdomen:  no tenderness; bowel sounds are present   Extremities: no cyanosis or clubbing   Skin: no xanthelasma, warm.    Neurologic: normal  bilateral, no tremors     Psychiatric: alert and oriented.  calm        Please refer above for cardiac ROS details.        Medical History  Surgical History Family History Social History   Past Medical History:   Diagnosis Date     Cerebral palsy (H) 8/30/2022     Chronic atrial fibrillation (H) 8/30/2022     DVT (deep venous thrombosis) (H) 8/30/2022     Epilepsy (H) 8/30/2022     Factor V  Ikeden (H) 8/30/2022     GERD (gastroesophageal reflux disease)      Hyperlipidemia LDL goal <100 8/30/2022     Long term current use of anticoagulant therapy 8/30/2022     Mental retardation      Seizure disorder (H)      Sleep apnea 8/30/2022     Past Surgical History:   Procedure Laterality Date     ABLATION OF DYSRHYTHMIC FOCUS       AV NODE ABLATION       CATARACT EXTRACTION EXTRACAPSULAR W/ INTRAOCULAR LENS IMPLANTATION Bilateral      EP LOOP RECORDER IMPLANT N/A 4/10/2023    Procedure: Loop Recorder Implant;  Surgeon: Abdullahi Goss MD;  Location: [facility]     GALLBLADDER SURGERY       SPINAL FUSION       No family history on file.     Social History     Socioeconomic History     Marital status: Single     Spouse name: Not on file     Number of children: Not on file     Years of education: Not on file     Highest education level: Not on file   Occupational History     Not on file   Tobacco Use     Smoking status: Never     Smokeless tobacco: Never   Substance and Sexual Activity     Alcohol use: Not on file     Drug use: Not on file     Sexual activity: Not on file   Other Topics Concern     Not on file   Social History Narrative    He is a resident of a group home (12/22/2014)     Social Drivers of Health     Financial Resource Strain: Not on file   Food Insecurity: Not on file   Transportation Needs: Not on file   Physical Activity: Not on file   Stress: Not on file   Social Connections: Not on file   Interpersonal Safety: Not on file   Housing Stability: Not on file           Medications  Allergies   Current Outpatient Medications   Medication Sig Dispense Refill     ACETAMINOPHEN (TYLENOL ORAL) Take 1,000 mg by mouth every 6 hours as needed       ammonium lactate (AMLACTIN) 12 % external cream Apply topically 2 times daily       calcium carbonate (TUMS E-X) 300 mg (750 mg) Chew Take 2 tablets by mouth 3 times daily as needed (Patient not taking: Reported on 3/13/2024)       CHOLECALCIFEROL,  VITAMIN D3, (VITAMIN D3 ORAL) [CHOLECALCIFEROL, VITAMIN D3, (VITAMIN D3 ORAL)] Take 2,000 Units by mouth daily.        clotrimazole (LOTRIMIN) 1 % external cream Apply topically 2 times daily       dextromethorphan-guaiFENesin (MUCINEX DM)  MG 12 hr tablet Take 1 tablet by mouth every 12 hours       diclofenac (VOLTAREN) 75 MG EC tablet Take 75 mg by mouth daily as needed for moderate pain       ELIQUIS ANTICOAGULANT 5 MG tablet TAKE 1 TABLET BY MOUTH TWICE DAILY. 60 tablet 5     famotidine (PEPCID) 20 MG tablet Take 20 mg by mouth nightly as needed       gabapentin (NEURONTIN) 100 MG capsule At Bedtime       ketoconazole (NIZORAL) 2 % external shampoo Apply topically daily as needed for itching or irritation       lamoTRIgine (LAMICTAL) 150 MG tablet Take 150 mg by mouth 2 times daily       loratadine (CLARITIN) 10 MG tablet Take 10 mg by mouth daily as needed       METHYLCELLULOSE (CITRUCEL ORAL) Take 2 g by mouth daily as needed (Patient not taking: Reported on 3/13/2024)       metoprolol tartrate (LOPRESSOR) 25 MG tablet Take 25 mg by mouth 2 times daily       nystatin (MYCOSTATIN) 311902 UNIT/GM external powder Apply 1 strip topically 4 times daily       ondansetron (ZOFRAN ODT) 4 MG ODT tab Take 1 tablet (4 mg) by mouth every 8 hours as needed for nausea (Patient not taking: Reported on 3/13/2024) 15 tablet 0       Allergies   Allergen Reactions     Biaxin [Clarithromycin] Unknown     Codeine Unknown     Diphenhydramine Hcl [Diphenhydramine] Unknown     Levofloxacin Unknown     Morphine Unknown          Lab Results    Chemistry/lipid CBC Cardiac Enzymes/BNP/TSH/INR   Recent Labs   Lab Test 10/25/21  1618   CHOL 194   HDL 33*      TRIG 470*     Recent Labs   Lab Test 10/25/21  1618 11/12/19  1000 10/16/18  1644    118 120     Recent Labs   Lab Test 09/08/22  1705      POTASSIUM 4.3   CHLORIDE 103   CO2 31*   GLC 95   BUN 16.1   CR 1.34*   GFRESTIMATED 64   VICENTA 9.9     Recent Labs   Lab  Test 09/08/22  1705 08/29/22  2141 02/18/22  1517   CR 1.34* 1.12 1.11     No results for input(s): A1C in the last 09038 hours.       Recent Labs   Lab Test 08/29/22  2141   WBC 8.7   HGB 13.6   HCT 41.5   MCV 99   *     Recent Labs   Lab Test 08/29/22  2141 05/18/20  1605 05/23/18  1643   HGB 13.6 13.9* 13.4*    No results for input(s): TROPONINI in the last 85583 hours.  No results for input(s): BNP, NTBNPI, NTBNP in the last 19531 hours.  Recent Labs   Lab Test 03/15/22  1631   TSH 3.71     No results for input(s): INR in the last 32153 hours.     Robin Antunez DO    The longitudinal plan of care for the diagnosis(es)/condition(s) as documented were addressed during this visit. Due to the added complexity in care, I will continue to support Lavon in the subsequent management and with ongoing continuity of care.  Continue device monitoring                  Thank you for allowing me to participate in the care of your patient.      Sincerely,     Robin Antunez DO     [facility]  cc:   Rony Mcallister MD  No address on file

## 2024-11-15 NOTE — PROGRESS NOTES
HEART CARE ENCOUNTER CONSULTATON NOTE      [facility]  [phone]      Assessment/Recommendations   Assessment:   1.  Paroxsymal atypical flutter s/p ablation.  Currently has ongoing atrial flutter on monitoring with no clear side effects.  Cardiac monitor demonstrated >90% atrial flutter.  Controlled heart rates on metoprolol  2.  Paroxysmal A. Fib.    3.  Factor V Leiden with history of DVT 2012  4.  Seizure disorder, [facility] (Dr. Carter).    5.  Syncope  6.  Cerebral palsy  7.  Linq II cardiac monitor.  Recent device interrogation 1/9/2024 demonstrated ongoing atrial flutter with controlled heart rates.  Atrial flutter burden was >90%.  There is no significant bradycardic episodes.  No symptomatic pauses.  No pauses > 5 seconds.     Plan:   1.  Continue Eliquis twice daily for A. fib and stroke prevention along with factor V Leiden and prior DVT  2.  Continue metoprolol 25 mg twice daily for rate control of A. Fib  3.  Episodes of change in cognition have improved with up titration of seizure medications.  Reviewed cardiac monitor in detail with no events of concern.    Follow-up in 6 months.         History of Present Illness/Subjective    HPI: Lavon Iraheta is a 54 year old male history of atrial flutter, atrial fib status, post atrial flutter ablation, mental delay, seizure disorder who presents to cardiology clinic in follow-up.      No active cardiac symptoms.  Reviewed implantable loop monitor in detail.  He denies any cardiac symptoms.  Zio patch demonstrated no sustained ventricular arrhythmias.  No significant pauses greater than 5 seconds.    Implantable loop Recorder:   Type: routine remote loop recorder transmission.   Device: Medtronic Linq II.   Presenting rhythm: AFib VS  bpm   Battery status: Good.   Arrhythmias: Since 7/18/2024 426 AF episodes, burden 91.8%, v-rates >/=120bpm 5%, known AFib. 4 pause episodes, AFib with 3 second pauses. No other events  detected.   Anticoagulant: Eliquis.     Echo: 2022  The left ventricle is normal in size.  Left ventricular function is normal.The ejection fraction is 55-60%.  Normal right ventricle size and systolic function.  The left atrium is moderately dilated.  No hemodynamically significant valvular abnormalities on 2D or color flow  imaging.         Physical Examination  Review of Systems   Vitals: /58 (BP Location: Left arm, Patient Position: Sitting, Cuff Size: Adult Large)   Pulse 66   Resp 16   Wt 104.8 kg (231 lb)   BMI 31.77 kg/m    BMI= Body mass index is 31.77 kg/m .  Wt Readings from Last 3 Encounters:   03/13/24 104.3 kg (230 lb)   06/27/23 105.2 kg (232 lb)   05/17/23 105.7 kg (233 lb 1.6 oz)        Pleasant, obesity   ENT/Mouth: membranes moist, no oral lesions or bleeding gums.      EYES:  no scleral icterus, normal conjunctivae       Chest/Lungs:   lungs are clear to auscultation, no rales or wheezing,  sternal scar, equal chest wall expansion    Cardiovascular:   Regular. Normal first and second heart sounds with no murmurs, rubs, or gallops; the carotid, radial and posterior tibial pulses are intact, Jugular venous pressure normal, no pitting edema bilaterally.  No change   Abdomen:  no tenderness; bowel sounds are present   Extremities: no cyanosis or clubbing   Skin: no xanthelasma, warm.    Neurologic: normal  bilateral, no tremors     Psychiatric: alert and oriented.  calm        Please refer above for cardiac ROS details.        Medical History  Surgical History Family History Social History   Past Medical History:   Diagnosis Date    Cerebral palsy (H) 8/30/2022    Chronic atrial fibrillation (H) 8/30/2022    DVT (deep venous thrombosis) (H) 8/30/2022    Epilepsy (H) 8/30/2022    Factor V Leiden (H) 8/30/2022    GERD (gastroesophageal reflux disease)     Hyperlipidemia LDL goal <100 8/30/2022    Long term current use of anticoagulant therapy 8/30/2022    Mental retardation     Seizure  disorder (H)     Sleep apnea 8/30/2022     Past Surgical History:   Procedure Laterality Date    ABLATION OF DYSRHYTHMIC FOCUS      AV NODE ABLATION      CATARACT EXTRACTION EXTRACAPSULAR W/ INTRAOCULAR LENS IMPLANTATION Bilateral     EP LOOP RECORDER IMPLANT N/A 4/10/2023    Procedure: Loop Recorder Implant;  Surgeon: Abdullahi Goss MD;  Location: [facility] LAB CV    GALLBLADDER SURGERY      SPINAL FUSION       No family history on file.     Social History     Socioeconomic History    Marital status: Single     Spouse name: Not on file    Number of children: Not on file    Years of education: Not on file    Highest education level: Not on file   Occupational History    Not on file   Tobacco Use    Smoking status: Never    Smokeless tobacco: Never   Substance and Sexual Activity    Alcohol use: Not on file    Drug use: Not on file    Sexual activity: Not on file   Other Topics Concern    Not on file   Social History Narrative    He is a resident of a group home (12/22/2014)     Social Drivers of Health     Financial Resource Strain: Not on file   Food Insecurity: Not on file   Transportation Needs: Not on file   Physical Activity: Not on file   Stress: Not on file   Social Connections: Not on file   Interpersonal Safety: Not on file   Housing Stability: Not on file           Medications  Allergies   Current Outpatient Medications   Medication Sig Dispense Refill    ACETAMINOPHEN (TYLENOL ORAL) Take 1,000 mg by mouth every 6 hours as needed      ammonium lactate (AMLACTIN) 12 % external cream Apply topically 2 times daily      calcium carbonate (TUMS E-X) 300 mg (750 mg) Chew Take 2 tablets by mouth 3 times daily as needed (Patient not taking: Reported on 3/13/2024)      CHOLECALCIFEROL, VITAMIN D3, (VITAMIN D3 ORAL) [CHOLECALCIFEROL, VITAMIN D3, (VITAMIN D3 ORAL)] Take 2,000 Units by mouth daily.       clotrimazole (LOTRIMIN) 1 % external cream Apply topically 2 times daily      dextromethorphan-guaiFENesin  (MUCINEX DM)  MG 12 hr tablet Take 1 tablet by mouth every 12 hours      diclofenac (VOLTAREN) 75 MG EC tablet Take 75 mg by mouth daily as needed for moderate pain      ELIQUIS ANTICOAGULANT 5 MG tablet TAKE 1 TABLET BY MOUTH TWICE DAILY. 60 tablet 5    famotidine (PEPCID) 20 MG tablet Take 20 mg by mouth nightly as needed      gabapentin (NEURONTIN) 100 MG capsule At Bedtime      ketoconazole (NIZORAL) 2 % external shampoo Apply topically daily as needed for itching or irritation      lamoTRIgine (LAMICTAL) 150 MG tablet Take 150 mg by mouth 2 times daily      loratadine (CLARITIN) 10 MG tablet Take 10 mg by mouth daily as needed      METHYLCELLULOSE (CITRUCEL ORAL) Take 2 g by mouth daily as needed (Patient not taking: Reported on 3/13/2024)      metoprolol tartrate (LOPRESSOR) 25 MG tablet Take 25 mg by mouth 2 times daily      nystatin (MYCOSTATIN) 271770 UNIT/GM external powder Apply 1 strip topically 4 times daily      ondansetron (ZOFRAN ODT) 4 MG ODT tab Take 1 tablet (4 mg) by mouth every 8 hours as needed for nausea (Patient not taking: Reported on 3/13/2024) 15 tablet 0       Allergies   Allergen Reactions    Biaxin [Clarithromycin] Unknown    Codeine Unknown    Diphenhydramine Hcl [Diphenhydramine] Unknown    Levofloxacin Unknown    Morphine Unknown          Lab Results    Chemistry/lipid CBC Cardiac Enzymes/BNP/TSH/INR   Recent Labs   Lab Test 10/25/21  1618   CHOL 194   HDL 33*      TRIG 470*     Recent Labs   Lab Test 10/25/21  1618 11/12/19  1000 10/16/18  1644    118 120     Recent Labs   Lab Test 09/08/22  1705      POTASSIUM 4.3   CHLORIDE 103   CO2 31*   GLC 95   BUN 16.1   CR 1.34*   GFRESTIMATED 64   VICENTA 9.9     Recent Labs   Lab Test 09/08/22  1705 08/29/22  2141 02/18/22  1517   CR 1.34* 1.12 1.11     No results for input(s): A1C in the last 94823 hours.       Recent Labs   Lab Test 08/29/22  2141   WBC 8.7   HGB 13.6   HCT 41.5   MCV 99   *     Recent Labs    Lab Test 08/29/22  2141 05/18/20  1605 05/23/18  1643   HGB 13.6 13.9* 13.4*    No results for input(s): TROPONINI in the last 70283 hours.  No results for input(s): BNP, NTBNPI, NTBNP in the last 42559 hours.  Recent Labs   Lab Test 03/15/22  1631   TSH 3.71     No results for input(s): INR in the last 98312 hours.     Robin Antunez,     The longitudinal plan of care for the diagnosis(es)/condition(s) as documented were addressed during this visit. Due to the added complexity in care, I will continue to support Lavon in the subsequent management and with ongoing continuity of care.  Continue device monitoring

## 2024-11-26 ENCOUNTER — ANCILLARY PROCEDURE (OUTPATIENT)
Dept: CARDIOLOGY | Facility: CLINIC | Age: 54
End: 2024-11-26
Attending: INTERNAL MEDICINE
Payer: MEDICARE

## 2024-11-26 DIAGNOSIS — R55 SYNCOPE, UNSPECIFIED SYNCOPE TYPE: ICD-10-CM

## 2024-11-26 DIAGNOSIS — Z95.818 IMPLANTABLE LOOP RECORDER PRESENT: ICD-10-CM

## 2024-11-26 DIAGNOSIS — I48.0 PAROXYSMAL ATRIAL FIBRILLATION (H): ICD-10-CM

## 2024-11-26 LAB
MDC_IDC_EPISODE_DTM: NORMAL
MDC_IDC_EPISODE_DURATION: NORMAL S
MDC_IDC_EPISODE_ID: 4059
MDC_IDC_EPISODE_TYPE: NORMAL
MDC_IDC_MSMT_BATTERY_STATUS: NORMAL
MDC_IDC_PG_IMPLANT_DTM: NORMAL
MDC_IDC_PG_MFG: NORMAL
MDC_IDC_PG_MODEL: NORMAL
MDC_IDC_PG_SERIAL: NORMAL
MDC_IDC_PG_TYPE: NORMAL
MDC_IDC_SESS_CLINIC_NAME: NORMAL
MDC_IDC_SESS_DTM: NORMAL
MDC_IDC_SESS_TYPE: NORMAL
MDC_IDC_SET_ZONE_DETECTION_BEATS_DENOMINATOR: 16 {BEATS}
MDC_IDC_SET_ZONE_DETECTION_BEATS_DENOMINATOR: 4 {BEATS}
MDC_IDC_SET_ZONE_DETECTION_BEATS_NUMERATOR: 16 {BEATS}
MDC_IDC_SET_ZONE_DETECTION_BEATS_NUMERATOR: 4 {BEATS}
MDC_IDC_SET_ZONE_DETECTION_INTERVAL: 2000 MS
MDC_IDC_SET_ZONE_DETECTION_INTERVAL: 3000 MS
MDC_IDC_SET_ZONE_DETECTION_INTERVAL: 340 MS
MDC_IDC_SET_ZONE_STATUS: NORMAL
MDC_IDC_SET_ZONE_TYPE: NORMAL
MDC_IDC_SET_ZONE_VENDOR_TYPE: NORMAL
MDC_IDC_STAT_AT_BURDEN_PERCENT: 69.9 %
MDC_IDC_STAT_AT_DTM_END: NORMAL
MDC_IDC_STAT_AT_DTM_START: NORMAL
MDC_IDC_STAT_EPISODE_RECENT_COUNT: 0
MDC_IDC_STAT_EPISODE_RECENT_COUNT: 1
MDC_IDC_STAT_EPISODE_RECENT_COUNT_DTM_END: NORMAL
MDC_IDC_STAT_EPISODE_RECENT_COUNT_DTM_START: NORMAL
MDC_IDC_STAT_EPISODE_TOTAL_COUNT: 0
MDC_IDC_STAT_EPISODE_TOTAL_COUNT: 13
MDC_IDC_STAT_EPISODE_TOTAL_COUNT: 2710
MDC_IDC_STAT_EPISODE_TOTAL_COUNT_DTM_END: NORMAL
MDC_IDC_STAT_EPISODE_TOTAL_COUNT_DTM_START: NORMAL
MDC_IDC_STAT_EPISODE_TYPE: NORMAL

## 2024-11-27 ENCOUNTER — TELEPHONE (OUTPATIENT)
Dept: CARDIOLOGY | Facility: CLINIC | Age: 54
End: 2024-11-27
Payer: MEDICARE

## 2024-11-27 NOTE — TELEPHONE ENCOUNTER
----- Message from Angelica Castro sent at 11/26/2024  3:53 PM CST -----  Regarding: Device RN Review  Type: Alert remote loop recorder transmission for pause and AF episodes.   Device: Medtronic Linq II.   Presenting rhythm: AFib VS  bpm   Battery status: Good.   Arrhythmias: Since 10/17/2024 200+ AF episodes, burden 69.9%, v-rates >/=120bpm 5%, known AFib. 2 pause episodes on 11/25/24, recording appears to be AFib with 3 second pauses. No other events detected.   Anticoagulant: Eliquis.   Plan: Routed to device RN for review. next remote check scheduled for 1/16/2025. GIANNI Castro, Device Specialist    Spoke to group home staff who could not relate any symptoms to logged episodes.    ADD: agree with above. Per chart review, patient saw Dr. Antunez 11/15/2024. Its noted pt has >90% burden of AT/AF and known asymptomatic puases under 5 seconds.     Current alerts for pauses set to 3 seconds and AF for 10 minutes. Will route to Dr. Antunez to request increase in alert parameters.     Martínez Pandey RN on 11/27/2024 at 8:00 AM        Changed in [employer] Website.     Martínez Pandey RN on 12/2/2024 at 11:00 AM

## 2025-01-23 ENCOUNTER — ANCILLARY PROCEDURE (OUTPATIENT)
Dept: CARDIOLOGY | Facility: CLINIC | Age: 55
End: 2025-01-23
Attending: INTERNAL MEDICINE
Payer: MEDICARE

## 2025-01-23 DIAGNOSIS — Z95.818 IMPLANTABLE LOOP RECORDER PRESENT: ICD-10-CM

## 2025-01-23 DIAGNOSIS — I48.0 PAROXYSMAL ATRIAL FIBRILLATION (H): ICD-10-CM

## 2025-01-23 DIAGNOSIS — R55 SYNCOPE, UNSPECIFIED SYNCOPE TYPE: ICD-10-CM

## 2025-01-23 LAB
MDC_IDC_EPISODE_DTM: NORMAL
MDC_IDC_EPISODE_DURATION: 3960 S
MDC_IDC_EPISODE_DURATION: 4800 S
MDC_IDC_EPISODE_DURATION: 5160 S
MDC_IDC_EPISODE_DURATION: 5280 S
MDC_IDC_EPISODE_DURATION: 6360 S
MDC_IDC_EPISODE_DURATION: 6480 S
MDC_IDC_EPISODE_DURATION: 6600 S
MDC_IDC_EPISODE_DURATION: 6960 S
MDC_IDC_EPISODE_DURATION: 7800 S
MDC_IDC_EPISODE_DURATION: NORMAL S
MDC_IDC_EPISODE_ID: 4488
MDC_IDC_EPISODE_ID: 4489
MDC_IDC_EPISODE_ID: 4490
MDC_IDC_EPISODE_ID: 4493
MDC_IDC_EPISODE_ID: 4494
MDC_IDC_EPISODE_ID: 4496
MDC_IDC_EPISODE_ID: 4503
MDC_IDC_EPISODE_ID: 4504
MDC_IDC_EPISODE_ID: 4508
MDC_IDC_EPISODE_ID: 4509
MDC_IDC_EPISODE_ID: 4517
MDC_IDC_EPISODE_ID: 4519
MDC_IDC_EPISODE_ID: 4520
MDC_IDC_EPISODE_ID: 4522
MDC_IDC_EPISODE_ID: 4523
MDC_IDC_EPISODE_ID: 4526
MDC_IDC_EPISODE_ID: 4527
MDC_IDC_EPISODE_TYPE: NORMAL
MDC_IDC_PG_IMPLANT_DTM: NORMAL
MDC_IDC_PG_MFG: NORMAL
MDC_IDC_PG_MODEL: NORMAL
MDC_IDC_PG_SERIAL: NORMAL
MDC_IDC_PG_TYPE: NORMAL
MDC_IDC_SESS_CLINIC_NAME: NORMAL
MDC_IDC_SESS_DTM: NORMAL
MDC_IDC_SESS_TYPE: NORMAL
MDC_IDC_STAT_AT_BURDEN_PERCENT: 91 %

## 2025-03-09 NOTE — TELEPHONE ENCOUNTER
Noted.  Phone call to patients Becca daly.  No answer and left message for return call.  Holter order entered.   Will await return call.   .

## 2025-03-13 ENCOUNTER — LAB REQUISITION (OUTPATIENT)
Dept: LAB | Facility: CLINIC | Age: 55
End: 2025-03-13
Payer: MEDICARE

## 2025-03-13 DIAGNOSIS — R05.8 OTHER SPECIFIED COUGH: ICD-10-CM

## 2025-03-13 PROCEDURE — 87635 SARS-COV-2 COVID-19 AMP PRB: CPT | Mod: ORL | Performed by: FAMILY MEDICINE

## 2025-03-14 LAB — SARS-COV-2 RNA RESP QL NAA+PROBE: NEGATIVE

## 2025-04-24 ENCOUNTER — ANCILLARY PROCEDURE (OUTPATIENT)
Dept: CARDIOLOGY | Facility: CLINIC | Age: 55
End: 2025-04-24
Attending: INTERNAL MEDICINE
Payer: MEDICARE

## 2025-04-24 DIAGNOSIS — I48.0 PAROXYSMAL ATRIAL FIBRILLATION (H): ICD-10-CM

## 2025-04-24 DIAGNOSIS — R55 SYNCOPE, UNSPECIFIED SYNCOPE TYPE: ICD-10-CM

## 2025-04-24 DIAGNOSIS — Z95.818 IMPLANTABLE LOOP RECORDER PRESENT: ICD-10-CM

## 2025-04-24 PROCEDURE — 93298 REM INTERROG DEV EVAL SCRMS: CPT | Performed by: INTERNAL MEDICINE

## 2025-06-11 ENCOUNTER — LAB REQUISITION (OUTPATIENT)
Dept: LAB | Facility: CLINIC | Age: 55
End: 2025-06-11
Payer: MEDICARE

## 2025-06-11 DIAGNOSIS — G40.209 LOCALIZATION-RELATED (FOCAL) (PARTIAL) SYMPTOMATIC EPILEPSY AND EPILEPTIC SYNDROMES WITH COMPLEX PARTIAL SEIZURES, NOT INTRACTABLE, WITHOUT STATUS EPILEPTICUS (H): ICD-10-CM

## 2025-06-11 PROCEDURE — 80053 COMPREHEN METABOLIC PANEL: CPT | Mod: ORL | Performed by: FAMILY MEDICINE

## 2025-06-11 PROCEDURE — 80175 DRUG SCREEN QUAN LAMOTRIGINE: CPT | Mod: ORL | Performed by: FAMILY MEDICINE

## 2025-06-12 LAB
ALBUMIN SERPL BCG-MCNC: 4.4 G/DL (ref 3.5–5.2)
ALP SERPL-CCNC: 77 U/L (ref 40–150)
ALT SERPL W P-5'-P-CCNC: 16 U/L (ref 0–70)
ANION GAP SERPL CALCULATED.3IONS-SCNC: 12 MMOL/L (ref 7–15)
AST SERPL W P-5'-P-CCNC: 19 U/L (ref 0–45)
BILIRUB SERPL-MCNC: 0.2 MG/DL
BUN SERPL-MCNC: 15.7 MG/DL (ref 6–20)
CALCIUM SERPL-MCNC: 9.5 MG/DL (ref 8.8–10.4)
CHLORIDE SERPL-SCNC: 103 MMOL/L (ref 98–107)
CREAT SERPL-MCNC: 1.21 MG/DL (ref 0.67–1.17)
EGFRCR SERPLBLD CKD-EPI 2021: 71 ML/MIN/1.73M2
GLUCOSE SERPL-MCNC: 105 MG/DL (ref 70–99)
HCO3 SERPL-SCNC: 23 MMOL/L (ref 22–29)
POTASSIUM SERPL-SCNC: 4.3 MMOL/L (ref 3.4–5.3)
PROT SERPL-MCNC: 7.4 G/DL (ref 6.4–8.3)
SODIUM SERPL-SCNC: 138 MMOL/L (ref 135–145)

## 2025-06-14 LAB — LAMOTRIGINE SERPL-MCNC: 10.8 UG/ML

## 2025-06-26 ENCOUNTER — TELEPHONE (OUTPATIENT)
Dept: CARDIOLOGY | Facility: CLINIC | Age: 55
End: 2025-06-26

## 2025-06-26 ENCOUNTER — ANCILLARY PROCEDURE (OUTPATIENT)
Dept: CARDIOLOGY | Facility: CLINIC | Age: 55
End: 2025-06-26
Attending: INTERNAL MEDICINE
Payer: MEDICARE

## 2025-06-26 DIAGNOSIS — Z95.818 IMPLANTABLE LOOP RECORDER PRESENT: ICD-10-CM

## 2025-06-26 DIAGNOSIS — I48.0 PAROXYSMAL ATRIAL FIBRILLATION (H): ICD-10-CM

## 2025-06-26 DIAGNOSIS — R55 SYNCOPE, UNSPECIFIED SYNCOPE TYPE: ICD-10-CM

## 2025-06-26 LAB
MDC_IDC_PG_IMPLANT_DTM: NORMAL
MDC_IDC_PG_MFG: NORMAL
MDC_IDC_PG_MODEL: NORMAL
MDC_IDC_PG_SERIAL: NORMAL
MDC_IDC_PG_TYPE: NORMAL
MDC_IDC_SESS_CLINIC_NAME: NORMAL
MDC_IDC_SESS_DTM: NORMAL
MDC_IDC_SESS_TYPE: NORMAL
MDC_IDC_SET_ZONE_TYPE: NORMAL
MDC_IDC_SET_ZONE_VENDOR_TYPE: NORMAL
MDC_IDC_STAT_AT_BURDEN_PERCENT: 79.6 %
MDC_IDC_STAT_EPISODE_RECENT_COUNT: 164
MDC_IDC_STAT_EPISODE_TOTAL_COUNT: 3273
MDC_IDC_STAT_EPISODE_TYPE: NORMAL
MDC_IDC_STAT_EPISODE_TYPE: NORMAL

## 2025-06-26 NOTE — TELEPHONE ENCOUNTER
[facility] Call Center    Phone Message    May a detailed message be left on voicemail: yes     Reason for Call: Other: Pt Mom would like a call back to discuss pt fall last night as he felt dizzy and they noticed he was high stepping and they are just now checking his injuries and will notify pt mom if they find anything, please reach out to pt mom to discuss, she did state pt is not dizzy at this time     Action Taken: Other: Cardio    Travel Screening: Not Applicable     Date of Service:

## 2025-06-26 NOTE — TELEPHONE ENCOUNTER
Spoke with pt's mom Becca Bowman (C2C on file) regarding device check results and follow-up. She verbalized understanding and was reassured that pt's device check looked good. Discussed that it was advised that pt follow-up in May - discussed that MAT is booked out until the end of Sept and advised that she call at the beginning of July to arrange for Oct follow-up. She was understanding. Also discussed follow-up with neurology regarding episode, she will reach out to [facility] to see if they can get him in sooner as she feels that the event could have been a seizure. She will be sure to reach out should she have further concerns or if Neurology feels that this is cardiac related. She had no other needs at this time. aa    -----------------------------------------------------------------------------  Msg received:  From: Ilene Colon RN  Sent: 6/26/2025  11:35 AM CDT  To: Angelika Guardado RN; Robin Antunez,*    Daniella: Loop recorder shows no new Episodes to correlate with symptoms of dizziness/fall. Will defer follow up to CV RN team.     Dr. Antunez: Patient has known/ongoing AFL,burden >80% -looks like this is known per your last OV notes. Will turn off AF recordings at this time as it is overflowing patients device logbook with   hundreds of unnecessary AFL episodes. Thank you!

## 2025-06-26 NOTE — TELEPHONE ENCOUNTER
Pulled Loop recorder report from [facility]. No new/significant episodes noted to correlate with patient's reported dizziness/fall last evening. Will let CV RN team know for patient follow up.      Has known persistent/ongoing AF and is on Eliquis, currently in AF and V-rates controlled.     Numerous AF recordings noted, will turn off AF recordings due to overflow on logbook- at this time. This is known/ongoing AF per Dr. Antunez last OV notes. AF burden 82%.          lIene Colon RN

## 2025-06-30 PROCEDURE — 93298 REM INTERROG DEV EVAL SCRMS: CPT | Performed by: INTERNAL MEDICINE

## 2025-07-28 ENCOUNTER — ANCILLARY PROCEDURE (OUTPATIENT)
Dept: CARDIOLOGY | Facility: CLINIC | Age: 55
End: 2025-07-28
Attending: INTERNAL MEDICINE
Payer: MEDICARE

## 2025-07-28 DIAGNOSIS — Z95.818 IMPLANTABLE LOOP RECORDER PRESENT: ICD-10-CM

## 2025-07-28 DIAGNOSIS — I48.0 PAROXYSMAL ATRIAL FIBRILLATION (H): ICD-10-CM

## 2025-07-28 DIAGNOSIS — R55 SYNCOPE, UNSPECIFIED SYNCOPE TYPE: ICD-10-CM

## 2025-07-28 PROCEDURE — 93298 REM INTERROG DEV EVAL SCRMS: CPT | Performed by: INTERNAL MEDICINE

## 2025-07-31 ENCOUNTER — LAB REQUISITION (OUTPATIENT)
Dept: LAB | Facility: CLINIC | Age: 55
End: 2025-07-31
Payer: MEDICARE

## 2025-07-31 DIAGNOSIS — G40.209 LOCALIZATION-RELATED (FOCAL) (PARTIAL) SYMPTOMATIC EPILEPSY AND EPILEPTIC SYNDROMES WITH COMPLEX PARTIAL SEIZURES, NOT INTRACTABLE, WITHOUT STATUS EPILEPTICUS (H): ICD-10-CM

## 2025-07-31 DIAGNOSIS — E55.9 VITAMIN D DEFICIENCY, UNSPECIFIED: ICD-10-CM

## 2025-07-31 DIAGNOSIS — Z13.6 ENCOUNTER FOR SCREENING FOR CARDIOVASCULAR DISORDERS: ICD-10-CM

## 2025-07-31 DIAGNOSIS — Z13.29 ENCOUNTER FOR SCREENING FOR OTHER SUSPECTED ENDOCRINE DISORDER: ICD-10-CM

## 2025-07-31 LAB
ERYTHROCYTE [DISTWIDTH] IN BLOOD BY AUTOMATED COUNT: 13.3 % (ref 10–15)
HCT VFR BLD AUTO: 44.1 % (ref 40–53)
HGB BLD-MCNC: 14.3 G/DL (ref 13.3–17.7)
MCH RBC QN AUTO: 31.6 PG (ref 26.5–33)
MCHC RBC AUTO-ENTMCNC: 32.4 G/DL (ref 31.5–36.5)
MCV RBC AUTO: 97 FL (ref 78–100)
PLATELET # BLD AUTO: 155 10E3/UL (ref 150–450)
RBC # BLD AUTO: 4.53 10E6/UL (ref 4.4–5.9)
WBC # BLD AUTO: 7.3 10E3/UL (ref 4–11)

## 2025-07-31 PROCEDURE — 84439 ASSAY OF FREE THYROXINE: CPT | Mod: ORL | Performed by: FAMILY MEDICINE

## 2025-07-31 PROCEDURE — 82306 VITAMIN D 25 HYDROXY: CPT | Mod: ORL | Performed by: FAMILY MEDICINE

## 2025-07-31 PROCEDURE — 85027 COMPLETE CBC AUTOMATED: CPT | Mod: ORL | Performed by: FAMILY MEDICINE

## 2025-07-31 PROCEDURE — 80061 LIPID PANEL: CPT | Mod: ORL | Performed by: FAMILY MEDICINE

## 2025-07-31 PROCEDURE — 80053 COMPREHEN METABOLIC PANEL: CPT | Mod: ORL | Performed by: FAMILY MEDICINE

## 2025-07-31 PROCEDURE — 84443 ASSAY THYROID STIM HORMONE: CPT | Mod: ORL | Performed by: FAMILY MEDICINE

## 2025-08-01 LAB
ALBUMIN SERPL BCG-MCNC: 4.4 G/DL (ref 3.5–5.2)
ALP SERPL-CCNC: 77 U/L (ref 40–150)
ALT SERPL W P-5'-P-CCNC: 15 U/L (ref 0–70)
ANION GAP SERPL CALCULATED.3IONS-SCNC: 13 MMOL/L (ref 7–15)
AST SERPL W P-5'-P-CCNC: 18 U/L (ref 0–45)
BILIRUB SERPL-MCNC: 0.3 MG/DL
BUN SERPL-MCNC: 16.9 MG/DL (ref 6–20)
CALCIUM SERPL-MCNC: 9.4 MG/DL (ref 8.8–10.4)
CHLORIDE SERPL-SCNC: 103 MMOL/L (ref 98–107)
CHOLEST SERPL-MCNC: 204 MG/DL
CREAT SERPL-MCNC: 1.16 MG/DL (ref 0.67–1.17)
EGFRCR SERPLBLD CKD-EPI 2021: 75 ML/MIN/1.73M2
FASTING STATUS PATIENT QL REPORTED: NO
FASTING STATUS PATIENT QL REPORTED: NO
GLUCOSE SERPL-MCNC: 114 MG/DL (ref 70–99)
HCO3 SERPL-SCNC: 23 MMOL/L (ref 22–29)
HDLC SERPL-MCNC: 31 MG/DL
LDLC SERPL CALC-MCNC: 125 MG/DL
NONHDLC SERPL-MCNC: 173 MG/DL
POTASSIUM SERPL-SCNC: 4 MMOL/L (ref 3.4–5.3)
PROT SERPL-MCNC: 7.4 G/DL (ref 6.4–8.3)
SODIUM SERPL-SCNC: 139 MMOL/L (ref 135–145)
T4 FREE SERPL-MCNC: 0.9 NG/DL (ref 0.9–1.7)
TRIGL SERPL-MCNC: 240 MG/DL
TSH SERPL DL<=0.005 MIU/L-ACNC: 4.88 UIU/ML (ref 0.3–4.2)
VIT D+METAB SERPL-MCNC: 40 NG/ML (ref 20–50)

## (undated) RX ORDER — FENTANYL CITRATE 50 UG/ML
INJECTION, SOLUTION INTRAMUSCULAR; INTRAVENOUS
Status: DISPENSED
Start: 2022-08-30